# Patient Record
Sex: FEMALE | Race: WHITE | Employment: OTHER | URBAN - METROPOLITAN AREA
[De-identification: names, ages, dates, MRNs, and addresses within clinical notes are randomized per-mention and may not be internally consistent; named-entity substitution may affect disease eponyms.]

---

## 2022-10-24 ENCOUNTER — MEDICAL CORRESPONDENCE (OUTPATIENT)
Dept: HEALTH INFORMATION MANAGEMENT | Facility: CLINIC | Age: 77
End: 2022-10-24

## 2022-10-25 DIAGNOSIS — I48.91 A-FIB (H): Primary | ICD-10-CM

## 2023-03-24 ENCOUNTER — HOSPITAL ENCOUNTER (EMERGENCY)
Facility: CLINIC | Age: 78
Discharge: HOME | End: 2023-03-24
Attending: EMERGENCY MEDICINE | Admitting: EMERGENCY MEDICINE
Payer: COMMERCIAL

## 2023-03-24 VITALS
SYSTOLIC BLOOD PRESSURE: 120 MMHG | WEIGHT: 138 LBS | HEIGHT: 55 IN | OXYGEN SATURATION: 98 % | HEART RATE: 93 BPM | DIASTOLIC BLOOD PRESSURE: 81 MMHG | RESPIRATION RATE: 13 BRPM | BODY MASS INDEX: 31.94 KG/M2 | TEMPERATURE: 97.3 F

## 2023-03-24 DIAGNOSIS — I48.91 ATRIAL FIBRILLATION WITH RVR (H): ICD-10-CM

## 2023-03-24 PROBLEM — J30.2 SEASONAL ALLERGIES: Status: ACTIVE | Noted: 2020-06-08

## 2023-03-24 PROBLEM — R42 DIZZINESS: Status: ACTIVE | Noted: 2018-03-20

## 2023-03-24 PROBLEM — E87.1 ACUTE HYPONATREMIA: Status: ACTIVE | Noted: 2021-05-07

## 2023-03-24 PROBLEM — G45.9 TRANSIENT ISCHEMIC ATTACK (TIA): Status: ACTIVE | Noted: 2021-05-07

## 2023-03-24 PROBLEM — Z86.0101 HISTORY OF ADENOMATOUS POLYP OF COLON: Status: ACTIVE | Noted: 2018-01-02

## 2023-03-24 PROBLEM — H10.13 ALLERGIC CONJUNCTIVITIS OF BOTH EYES: Status: ACTIVE | Noted: 2020-06-08

## 2023-03-24 LAB
ANION GAP SERPL CALCULATED.3IONS-SCNC: 9 MMOL/L (ref 7–15)
ATRIAL RATE - MUSE: 93 BPM
ATRIAL RATE - MUSE: NORMAL BPM
BUN SERPL-MCNC: 11.6 MG/DL (ref 8–23)
CALCIUM SERPL-MCNC: 9.3 MG/DL (ref 8.8–10.2)
CHLORIDE SERPL-SCNC: 99 MMOL/L (ref 98–107)
CREAT SERPL-MCNC: 0.65 MG/DL (ref 0.51–0.95)
DEPRECATED HCO3 PLAS-SCNC: 21 MMOL/L (ref 22–29)
DIASTOLIC BLOOD PRESSURE - MUSE: NORMAL MMHG
DIASTOLIC BLOOD PRESSURE - MUSE: NORMAL MMHG
ERYTHROCYTE [DISTWIDTH] IN BLOOD BY AUTOMATED COUNT: 13.5 % (ref 10–15)
GFR SERPL CREATININE-BSD FRML MDRD: 90 ML/MIN/1.73M2
GLUCOSE SERPL-MCNC: 123 MG/DL (ref 70–99)
HCT VFR BLD AUTO: 40.6 % (ref 35–47)
HGB BLD-MCNC: 13.7 G/DL (ref 11.7–15.7)
INTERPRETATION ECG - MUSE: NORMAL
INTERPRETATION ECG - MUSE: NORMAL
MCH RBC QN AUTO: 29.1 PG (ref 26.5–33)
MCHC RBC AUTO-ENTMCNC: 33.7 G/DL (ref 31.5–36.5)
MCV RBC AUTO: 86 FL (ref 78–100)
NT-PROBNP SERPL-MCNC: 2315 PG/ML (ref 0–1800)
P AXIS - MUSE: 66 DEGREES
P AXIS - MUSE: NORMAL DEGREES
PLATELET # BLD AUTO: 391 10E3/UL (ref 150–450)
POTASSIUM SERPL-SCNC: 4.3 MMOL/L (ref 3.4–5.3)
PR INTERVAL - MUSE: 206 MS
PR INTERVAL - MUSE: NORMAL MS
QRS DURATION - MUSE: 134 MS
QRS DURATION - MUSE: 158 MS
QT - MUSE: 314 MS
QT - MUSE: 402 MS
QTC - MUSE: 499 MS
QTC - MUSE: 507 MS
R AXIS - MUSE: -16 DEGREES
R AXIS - MUSE: 22 DEGREES
RBC # BLD AUTO: 4.71 10E6/UL (ref 3.8–5.2)
SODIUM SERPL-SCNC: 129 MMOL/L (ref 136–145)
SYSTOLIC BLOOD PRESSURE - MUSE: NORMAL MMHG
SYSTOLIC BLOOD PRESSURE - MUSE: NORMAL MMHG
T AXIS - MUSE: -9 DEGREES
T AXIS - MUSE: 24 DEGREES
TROPONIN T SERPL HS-MCNC: 15 NG/L
TROPONIN T SERPL HS-MCNC: 16 NG/L
VENTRICULAR RATE- MUSE: 157 BPM
VENTRICULAR RATE- MUSE: 93 BPM
WBC # BLD AUTO: 8.1 10E3/UL (ref 4–11)

## 2023-03-24 PROCEDURE — 82310 ASSAY OF CALCIUM: CPT | Performed by: EMERGENCY MEDICINE

## 2023-03-24 PROCEDURE — 272N000240 HC CARDIOVERT/DEFIB/PACER SUPP

## 2023-03-24 PROCEDURE — 84484 ASSAY OF TROPONIN QUANT: CPT | Performed by: EMERGENCY MEDICINE

## 2023-03-24 PROCEDURE — 96374 THER/PROPH/DIAG INJ IV PUSH: CPT

## 2023-03-24 PROCEDURE — 93005 ELECTROCARDIOGRAM TRACING: CPT | Mod: 76

## 2023-03-24 PROCEDURE — 36415 COLL VENOUS BLD VENIPUNCTURE: CPT | Performed by: EMERGENCY MEDICINE

## 2023-03-24 PROCEDURE — 85048 AUTOMATED LEUKOCYTE COUNT: CPT | Performed by: EMERGENCY MEDICINE

## 2023-03-24 PROCEDURE — 999N000054 HC STATISTIC EKG NON-CHARGEABLE

## 2023-03-24 PROCEDURE — 83880 ASSAY OF NATRIURETIC PEPTIDE: CPT | Performed by: EMERGENCY MEDICINE

## 2023-03-24 PROCEDURE — 250N000011 HC RX IP 250 OP 636: Performed by: EMERGENCY MEDICINE

## 2023-03-24 PROCEDURE — 84484 ASSAY OF TROPONIN QUANT: CPT | Mod: 91 | Performed by: EMERGENCY MEDICINE

## 2023-03-24 PROCEDURE — 92960 CARDIOVERSION ELECTRIC EXT: CPT

## 2023-03-24 PROCEDURE — 99285 EMERGENCY DEPT VISIT HI MDM: CPT | Mod: 25

## 2023-03-24 PROCEDURE — 93005 ELECTROCARDIOGRAM TRACING: CPT

## 2023-03-24 RX ORDER — PROPAFENONE HYDROCHLORIDE 150 MG/1
1 TABLET, COATED ORAL 3 TIMES DAILY
COMMUNITY
Start: 2023-03-14 | End: 2023-04-06

## 2023-03-24 RX ORDER — ATORVASTATIN CALCIUM 10 MG/1
1 TABLET, FILM COATED ORAL DAILY
COMMUNITY
Start: 2023-03-07 | End: 2024-09-23

## 2023-03-24 RX ORDER — OLOPATADINE HYDROCHLORIDE 1 MG/ML
1 SOLUTION/ DROPS OPHTHALMIC 2 TIMES DAILY
COMMUNITY
End: 2023-07-31

## 2023-03-24 RX ORDER — PROPOFOL 10 MG/ML
INJECTION, EMULSION INTRAVENOUS DAILY PRN
Status: COMPLETED | OUTPATIENT
Start: 2023-03-24 | End: 2023-03-24

## 2023-03-24 RX ORDER — CETIRIZINE HYDROCHLORIDE 10 MG/1
10 TABLET ORAL DAILY
COMMUNITY

## 2023-03-24 RX ORDER — TIOTROPIUM BROMIDE 18 UG/1
18 CAPSULE ORAL; RESPIRATORY (INHALATION)
COMMUNITY
Start: 2022-11-16 | End: 2024-09-23 | Stop reason: ALTCHOICE

## 2023-03-24 RX ORDER — MONTELUKAST SODIUM 10 MG/1
1 TABLET ORAL AT BEDTIME
COMMUNITY
Start: 2023-01-16

## 2023-03-24 RX ORDER — METOPROLOL SUCCINATE 50 MG/1
1 TABLET, EXTENDED RELEASE ORAL DAILY
COMMUNITY
Start: 2023-02-22 | End: 2023-07-31

## 2023-03-24 RX ORDER — DOCUSATE SODIUM 100 MG/1
1 CAPSULE, LIQUID FILLED ORAL DAILY
COMMUNITY
Start: 2022-08-14

## 2023-03-24 RX ORDER — MOMETASONE FUROATE AND FORMOTEROL FUMARATE DIHYDRATE 200; 5 UG/1; UG/1
2 AEROSOL RESPIRATORY (INHALATION)
COMMUNITY
Start: 2022-11-16 | End: 2023-11-16

## 2023-03-24 RX ORDER — PROPOFOL 10 MG/ML
INJECTION, EMULSION INTRAVENOUS
Status: COMPLETED
Start: 2023-03-24 | End: 2023-03-24

## 2023-03-24 RX ORDER — ALBUTEROL SULFATE 90 UG/1
2 AEROSOL, METERED RESPIRATORY (INHALATION) EVERY 4 HOURS PRN
COMMUNITY
Start: 2022-11-16

## 2023-03-24 RX ADMIN — PROPOFOL 50 MG: 10 INJECTION, EMULSION INTRAVENOUS at 22:51

## 2023-03-24 ASSESSMENT — ENCOUNTER SYMPTOMS
SHORTNESS OF BREATH: 1
PALPITATIONS: 1

## 2023-03-24 ASSESSMENT — ACTIVITIES OF DAILY LIVING (ADL)
ADLS_ACUITY_SCORE: 35
ADLS_ACUITY_SCORE: 33

## 2023-03-25 NOTE — ED PROVIDER NOTES
History     Chief Complaint:  Chest Pain and Palpitations       The history is provided by the patient.      Margareth Hogue is a 77 year old female on Eliquis with a history of paroxysmal atrial fibrillation S/P ablation who presents with chest pain and palpitations. The patient reports that today she was seen in the clinic for shortness of breath with exertion. At that time she was found to be tachycardia with an irregular rhythm. She states that she felt well yesterday but notes that she did experience some difficulty breathing three days ago. She additionally complains of chest pressure for the past three days. She denies having missed any doses of Eliquis.    Independent Historian:   None - Patient Only    Review of External Notes: I reviewed her emergency room note from 3/2/2023 at [facility]    ROS:  Review of Systems   Respiratory: Positive for shortness of breath.    Cardiovascular: Positive for palpitations.   All other systems reviewed and are negative.    Allergies:  Sulfa Drugs  Diazepam  Meperidine  Morphine  Penicillins  Sulfamethoxazole-Trimethoprim  Zafirlukast     Medications:    Albuterol inhaler  Colace  Dulera  Eliquis  Flonase  Lipitor  Prilosec  Rythmol  Singulair  Spiriva  Toprol-XL    Past Medical History:    Acute hyponatremia  Allergic conjunctivitis of both eyes  Chronic insomnia  GERD without esophagitis  H/O adenomatous polyp of colon  H/O renal cell carcinoma  Hyperlipidemia  Irritable bowel syndrome without diarhea  Moderate persistent asthma  Paroxysmal atrial fibrillation  Primary osteoarthritis, left shoulder  Sarcoidosis, lung  Sensorineural hearing loss, bilateral  TIA    Past Surgical History:    Atrial ablation  Total nephrectomy, right  Shoulder arthroplasty, bilateral  Foot surgery, bilateral  Tonsillectomy  Appendectomy  Breast biopsy  Small intestine surgery    Family History:    Father: Cataracts, heart disease, lymphoma  Mother: Cataracts,CAD, glaucoma, MI,  "lymphoma  Brother: Leukemia, lymphoma    Social History:  The patient presents to the ED with her .  They arrived via private vehicle.    Physical Exam     Patient Vitals for the past 24 hrs:   BP Temp Temp src Pulse Resp SpO2 Height Weight   03/24/23 2303 120/81 -- -- 93 13 98 % -- --   03/24/23 2300 119/79 -- -- 92 15 98 % -- --   03/24/23 2255 119/73 -- -- 93 29 98 % -- --   03/24/23 2254 -- -- -- -- 17 -- -- --   03/24/23 2250 (!) 144/78 -- -- (!) 134 (!) 43 100 % -- --   03/24/23 2245 (!) 141/104 -- -- (!) 130 22 100 % -- --   03/24/23 2240 (!) 119/108 -- -- (!) 137 10 99 % -- --   03/24/23 2238 (!) 123/96 -- -- (!) 134 10 99 % -- --   03/24/23 1955 (!) 127/92 97.3  F (36.3  C) Temporal (!) 157 18 98 % 0.53 m (1' 8.87\") 62.6 kg (138 lb)        Physical Exam   Nursing note and vitals reviewed.    Constitutional:  Appears comfortable.    HENT:                Nose normal.  No discharge.      Oral mucosa is moist.  Eyes:    Conjunctivae are normal without injection.  Pupils are equal.  Cardiovascular:  Tachycardic, regular rhythm with normal S1 and S2.      Normal heart sounds and peripheral pulses 2+ and equal.       No murmur or zenon.  Pulmonary:  Effort normal and breath sounds clear to auscultation bilaterally.    No respiratory distress.  No stridor.     No wheezes. No rales.     GI:    Soft. No distension and no mass. No tenderness.      No flank pain.    Musculoskeletal:  Normal range of motion. No extremity deformity.     No edema and no tenderness.    Neurological:   Alert and oriented. No focal weakness.  Skin:    Skin is warm and dry. No rash noted.   Psychiatric:   Behavior is normal. Appropriate mood and affect.     Judgment and thought content normal.     Emergency Department Course   ECG  ECG taken at 1950, ECG read at 1955  Critical Test Result: High HR.  Atrial flutter.  Rate 157 bpm. CA interval * ms. QRS duration 134 ms. QT/QTc 314/507 ms. P-R-T axes * -16 -9.   ECG results from 03/24/23 "   EKG 12-lead, tracing only     Value    Systolic Blood Pressure     Diastolic Blood Pressure     Ventricular Rate 157    Atrial Rate     WY Interval     QRS Duration 134        QTc 507    P Axis     R AXIS -16    T Axis -9    Interpretation ECG      * Critical Test Result: High HR  Atrial Flutter with 2:1 block  Right bundle branch block  Abnormal ECG  No previous ECGs available  Confirmed by GENERATED REPORT, COMPUTER ([id number]),  Aasen, Bradley ([id number]) on 3/24/2023 8:21:51 PM     EKG 12 lead     Value    Systolic Blood Pressure     Diastolic Blood Pressure     Ventricular Rate 93    Atrial Rate 93    WY Interval 206    QRS Duration 158        QTc 499    P Axis 66    R AXIS 22    T Axis 24    Interpretation ECG      Sinus rhythm  Right bundle branch block  Septal infarct , age undetermined  Abnormal ECG  When compared with ECG of 24-MAR-2023 19:50,  Sinus rhythm has replaced Atrial Flutter  Vent. rate has decreased BY  64 BPM  Confirmed by GENERATED REPORT, COMPUTER ([id number]),  Avinash Singletary ([id number]) on 3/24/2023 10:59:35 PM           Laboratory:  Labs Ordered and Resulted from Time of ED Arrival to Time of ED Departure   BASIC METABOLIC PANEL - Abnormal       Result Value    Sodium 129 (*)     Potassium 4.3      Chloride 99      Carbon Dioxide (CO2) 21 (*)     Anion Gap 9      Urea Nitrogen 11.6      Creatinine 0.65      Calcium 9.3      Glucose 123 (*)     GFR Estimate 90     TROPONIN T, HIGH SENSITIVITY - Abnormal    Troponin T, High Sensitivity 16 (*)    TROPONIN T, HIGH SENSITIVITY - Abnormal    Troponin T, High Sensitivity 15 (*)    NT PROBNP INPATIENT - Abnormal    N terminal Pro BNP Inpatient 2,315 (*)    CBC WITH PLATELETS - Normal    WBC Count 8.1      RBC Count 4.71      Hemoglobin 13.7      Hematocrit 40.6      MCV 86      MCH 29.1      MCHC 33.7      RDW 13.5      Platelet Count 391          [facility]    -Cardioversion External    Date/Time: 3/24/2023 9:52  PM  Performed by: Genet Ibrahim MD  Authorized by: Genet Ibrahim MD     Risks, benefits and alternatives discussed.    ED EVALUATION:      Assessment Time: 3/24/2023 9:38 PM      I have performed an Emergency Department Evaluation including taking a history and physical examination, this evaluation will be documented in the electronic medical record for this ED encounter.     Indication: Rapid Atrial Fibrillation    ASA Class: Class 2- mild systemic disease, no acute problems, no functional limitations    Mallampati: Grade 1- soft palate, uvula, tonsillar pillars, and posterior pharyngeal wall visible    NPO Status: appropriately NPO for procedure    UNIVERSAL PROTOCOL   Site Marked: NA  Prior Images Obtained and Reviewed:  NA  Required items: Required blood products, implants, devices and special equipment available    Patient identity confirmed:  Verbally with patient and arm band  Patient was reevaluated immediately before administering moderate or deep sedation or anesthesia  Confirmation Checklist:  Patient's identity using two indicators  Time out: Immediately prior to the procedure a time out was called    Universal Protocol: the Joint Commission Universal Protocol was followed      SEDATION  Patient Sedated: Yes    Sedation Type:  Deep  Sedation:  Propofol  Vital signs: Vital signs monitored during sedation      PRE-PROCEDURE DETAILS:     Cardioversion basis:  Elective    Rhythm:  Atrial fibrillation    Electrode placement:  Anterior-posterior  Attempt one:     Cardioversion mode:  Synchronous    Waveform:  Biphasic    Shock (Joules):  150    Shock outcome:  Conversion to normal sinus rhythm  Post-procedure details:     Patient status:  Awake      PROCEDURE    Patient Tolerance:  Patient tolerated the procedure well with no immediate complications  Length of time physician/provider present for 1:1 monitoring during sedation: 15     Emergency Department Course &  Assessments:    Interventions:  Medications   propofol (DIPRIVAN) 200 MG/20ML injection (  Not Given 3/24/23 2259)   propofol (DIPRIVAN) injection 10 mg/mL vial (50 mg Intravenous $Given 3/24/23 2251)        Assessments:  2138 I obtained history and examined the patient.    Independent Interpretation (X-rays, CTs, rhythm strip):  None    Consultations/Discussion of Management or Tests:  None        Social Determinants of Health affecting care:   None    Disposition:  The patient was discharged to home.     Impression & Plan      Medical Decision Making:  Patient with known A-fib with a history of ablation comes in in rapid A-fib.  When she first got here it looked like atrial flutter but then it switched to atrial fibrillation.  It sounds like she was probably in it for the last couple of days, maybe up to 3 the way she has been feeling.  She feels some chest pressure.  Labs are obtained and I reviewed her records and her sodium was a little bit low which it has been recently at 129, renal function is normal, troponin was up slightly at 16 and her CBC is normal.  She is on Eliquis and has not missed any doses.  Her risk for clot formation is very very low and I feel she is a candidate for attempt at cardioversion.  After I talked to her about the procedure and risks and benefits along with propofol, she agreed and she was successfully cardioverted with 150 J.  Her pressure in her chest that she had felt completely went away after she was back in sinus rhythm.  I got a second troponin almost 3 hours later and it was 15 or unchanged.  I believe she had some demand ischemia or a troponin leak from the rapid heartbeat.  Her BNP was up a little as well at 2315 which would be consistent with tachycardia related heart strain.  She feels much better at this time.  I feel she can be discharged continue her current medications and will follow-up with cardiology.    Push fluids, take your medications as directed.  Schedule a  follow-up appointment with your cardiologist.  If you go back into fast atrial fibrillation again, return to the ER.    Diagnosis:    ICD-10-CM    1. Atrial fibrillation with RVR (H)  I48.91            Discharge Medications:  Discharge Medication List as of 3/24/2023 11:48 PM           Scribe Disclosure:  ANTHONY, Gabriel Cosby, am serving as a scribe at 9:57 PM on 3/24/2023 to document services personally performed by Genet Ibrahim MD based on my observations and the provider's statements to me.        Genet Ibrahim MD  03/24/23 2327

## 2023-03-25 NOTE — ED TRIAGE NOTES
Heart palpitations with chest pain, seen at clinic for asthma and found heart rate was up to 150s. History of a fib on Eliquis.      Triage Assessment     Row Name 03/24/23 1953       Triage Assessment (Adult)    Airway WDL WDL       Respiratory WDL    Respiratory WDL WDL       Skin Circulation/Temperature WDL    Skin Circulation/Temperature WDL WDL       Cardiac WDL    Cardiac WDL X       Peripheral/Neurovascular WDL    Peripheral Neurovascular WDL WDL       Cognitive/Neuro/Behavioral WDL    Cognitive/Neuro/Behavioral WDL WDL

## 2023-03-25 NOTE — DISCHARGE INSTRUCTIONS
Push fluids, take your medications as directed.  Schedule a follow-up appointment with your cardiologist.  If you go back into fast atrial fibrillation again, return to the ER.

## 2023-03-31 ENCOUNTER — HOSPITAL ENCOUNTER (EMERGENCY)
Facility: CLINIC | Age: 78
Discharge: HOME | End: 2023-03-31
Attending: EMERGENCY MEDICINE | Admitting: EMERGENCY MEDICINE
Payer: COMMERCIAL

## 2023-03-31 VITALS
SYSTOLIC BLOOD PRESSURE: 126 MMHG | WEIGHT: 130 LBS | DIASTOLIC BLOOD PRESSURE: 74 MMHG | HEART RATE: 73 BPM | HEIGHT: 63 IN | RESPIRATION RATE: 12 BRPM | BODY MASS INDEX: 23.04 KG/M2 | TEMPERATURE: 97.9 F | OXYGEN SATURATION: 100 %

## 2023-03-31 DIAGNOSIS — I48.92 ATRIAL FLUTTER WITH RAPID VENTRICULAR RESPONSE (H): ICD-10-CM

## 2023-03-31 LAB
ATRIAL RATE - MUSE: NORMAL BPM
DIASTOLIC BLOOD PRESSURE - MUSE: NORMAL MMHG
INTERPRETATION ECG - MUSE: NORMAL
P AXIS - MUSE: NORMAL DEGREES
PR INTERVAL - MUSE: NORMAL MS
QRS DURATION - MUSE: 156 MS
QT - MUSE: 364 MS
QTC - MUSE: 507 MS
R AXIS - MUSE: -13 DEGREES
SYSTOLIC BLOOD PRESSURE - MUSE: NORMAL MMHG
T AXIS - MUSE: 10 DEGREES
VENTRICULAR RATE- MUSE: 117 BPM

## 2023-03-31 PROCEDURE — 999N000157 HC STATISTIC RCP TIME EA 10 MIN

## 2023-03-31 PROCEDURE — 250N000011 HC RX IP 250 OP 636: Performed by: EMERGENCY MEDICINE

## 2023-03-31 PROCEDURE — 99285 EMERGENCY DEPT VISIT HI MDM: CPT | Mod: 25

## 2023-03-31 PROCEDURE — 93005 ELECTROCARDIOGRAM TRACING: CPT | Mod: 59

## 2023-03-31 PROCEDURE — 92960 CARDIOVERSION ELECTRIC EXT: CPT

## 2023-03-31 PROCEDURE — 96374 THER/PROPH/DIAG INJ IV PUSH: CPT | Mod: 59

## 2023-03-31 RX ORDER — PROPOFOL 10 MG/ML
INJECTION, EMULSION INTRAVENOUS DAILY PRN
Status: COMPLETED | OUTPATIENT
Start: 2023-03-31 | End: 2023-03-31

## 2023-03-31 RX ORDER — PROPOFOL 10 MG/ML
INJECTION, EMULSION INTRAVENOUS
Status: DISCONTINUED
Start: 2023-03-31 | End: 2023-03-31 | Stop reason: HOSPADM

## 2023-03-31 RX ADMIN — PROPOFOL 50 MG: 10 INJECTION, EMULSION INTRAVENOUS at 08:16

## 2023-03-31 ASSESSMENT — ENCOUNTER SYMPTOMS
SHORTNESS OF BREATH: 1
SLEEP DISTURBANCE: 1
PALPITATIONS: 1

## 2023-03-31 ASSESSMENT — ACTIVITIES OF DAILY LIVING (ADL): ADLS_ACUITY_SCORE: 35

## 2023-03-31 NOTE — ED PROVIDER NOTES
History     Chief Complaint:  Palpitations       HPI   Margareth Hogue is a 77 year old anticoagulated female with a history of a-fib, cancer, and asthma who presents with palpitations that began yesterday. She was recently cardioverted for a-fib, and states that she felt she went into a-fib again. She has not been able to sleep well and has been short of breath as a result. She has not missed any doses of her Eliquis.      Independent Historian:   Patient    Review of External Notes: I reviewed her ED visit on 3/24 for a-fib where she underwent cardioversion.    ROS:  Review of Systems   Respiratory: Positive for shortness of breath.    Cardiovascular: Positive for palpitations.   Psychiatric/Behavioral: Positive for sleep disturbance.   All other systems reviewed and are negative.      Allergies:  Sulfa Drugs  Diazepam  Meperidine  Morphine  Penicillins  Sulfamethoxazole-Trimethoprim  Zafirlukast      Medications:    Albuterol inhaler  Colace  Dulera  Eliquis  Flonase  Lipitor  Prilosec  Rythmol  Singulair  Spiriva  Toprol-XL     Past Medical History:    Acute hyponatremia  Allergic conjunctivitis of both eyes  Chronic insomnia  GERD without esophagitis  adenomatous polyp of colon  renal cell carcinoma  Hyperlipidemia  Irritable bowel syndrome without diarhea  Moderate persistent asthma  Paroxysmal atrial fibrillation  Primary osteoarthritis, left shoulder  Sarcoidosis, lung  Sensorineural hearing loss, bilateral  TIA     Past Surgical History:    Atrial ablation  Total nephrectomy, right  Shoulder arthroplasty, bilateral  Foot surgery, bilateral  Tonsillectomy  Appendectomy  Breast biopsy  Small intestine surgery     Family History:    Father: Cataracts, heart disease, lymphoma  Mother: Cataracts,CAD, glaucoma, MI, lymphoma  Brother: Leukemia, lymphoma    Social History:     PCP: Nadira Rob     Physical Exam     Patient Vitals for the past 24 hrs:   BP Temp Temp src Pulse Resp SpO2 Height Weight  "  03/31/23 0915 -- -- -- 76 15 -- -- --   03/31/23 0906 -- -- -- 74 10 -- -- --   03/31/23 0820 126/74 -- -- 77 12 100 % -- --   03/31/23 0815 134/85 -- -- 105 15 100 % -- --   03/31/23 0810 (!) 140/108 -- -- 104 14 100 % -- --   03/31/23 0743 (!) 150/103 97.9  F (36.6  C) Oral 119 16 98 % 1.6 m (5' 3\") 59 kg (130 lb)        Physical Exam  Eye:  Pupils are equal, round, and reactive.  Extraocular movements intact.    ENT:  No rhinorrhea.  Moist mucus membranes.  Normal tongue and tonsil.    Cardiac:  Tachycardic but regular rate and rhythm.  No murmurs, gallops, or rubs.    Pulmonary:  Clear to auscultation bilaterally.  No wheezes, rales, or rhonchi.    Abdomen:  Positive bowel sounds.  Abdomen is soft and non-distended, without focal tenderness.    Musculoskeletal:  Normal movement of all extremities without evidence for deficit.    Skin:  Warm and dry without rashes.    Neurologic:  Non-focal exam without asymmetric weakness or numbness.     Psychiatric:  Normal affect with appropriate interaction with examiner.      Emergency Department Course   ECG  ECG taken at 0740, ECG read at 0745  A-flutter  Indeterminate axis  Right BBB  Abnormal ECG   Rate 117 bpm. WI interval * ms. QRS duration 156 ms. QT/QTc 364/507 ms. P-R-T axes * -13 10.     ECG #2  ECG taken at 0815, ECG read at 0815  Sinus rhythm with 1st degree AV block  Right BBB  Abnormal ECG   Rate 77 bpm. WI interval 216 ms. QRS duration 170 ms. QT/QTc 428/484 ms. P-R-T axes 62 -6 18.     Procedures   Electrical Cardioversion         Procedure: Electrical Cardioversion        Indication: Atrial Flutter     Consent: Written from Patient     Universal Protocol: Universal protocol was followed and time out conducted just prior to starting procedure, confirming patient identity, site/side, procedure, patient position, and availability of correct equipment and implants.      Anesthesia/Sedation: Sedation: The patient was sedated, see separate procedure note for " details.      Procedure Detail:   Electrodes were placed: Anterior/lateral  Trial #1: Synchronized Cardioversion at 150 Joules      Patient Status:  The patient tolerated the procedure well: Yes. There were no complications.    .    Sedation     Procedure: Procedural Sedation    Sedation Level: Moderate    Indication: Cardioversion    Consent: Written from Patient     Universal protocol: Universal protocol was followed and time out conducted just prior to starting procedure, confirming patient identity, site/side, procedure, patient position, and availability of correct equipment and implants.     Last PO Intake: Emergent procedure    ASA Class: Class 1 - A normal healthy patient     Exam:  Mallampati:  Grade 1 - Soft palate, uvula, and pillars visible    Lungs: Clear   Heart: Irregular rate and rhythm     Medication: Propofol  Dose: 50 mg     Monitoring:  Monitoring consisted of heart rate, cardiac, continuous pulse oximetry, frequent blood pressure checks.   There was constant attendance by RN until patient recovered and constant attendance by physician until patient stable.   Intubation and emergency airway equipment available.     Response: Vital signs stable, oxygen saturations greater than 92%       Patient Status: Post procedure patient was alert.     Total Physician Drug Administration / Monitoring Time: 9 minutes.     Patient was monitored during recovery and returned to pre-procedure baseline.     Emergency Department Course & Assessments:    Interventions:  Medications   propofol (DIPRIVAN) injection 10 mg/mL vial (50 mg Intravenous $Given 3/31/23 0816)      Assessments:  0747 I obtained history and examined the patient, as noted above.  0814 I began the procedure at this time.    Independent Interpretation (X-rays, CTs, rhythm strip):  None    Consultations/Discussion of Management or Tests:  None     Social Determinants of Health affecting care:   None    Disposition:  The patient was discharged to  home.     Impression & Plan      Medical Decision Making:  This pleasant 77-year-old woman with a history of atrial fibrillation status post ablation, now with recurrent spells of atrial flutter undergoing cardioversion last week returns to us because of cardiac dysrhythmia back to atrial flutter.  She noticed this occurred last night and had a hard night of sleep.  She has been taking her anticoagulants.  She denies any significant chest pain or shortness of breath.    On exam, she appears well.  EKG does show that she is in atrial flutter with a rapid ventricular response.  I did not elect to recheck her labs as they were just done earlier in the week and were unremarkable.  She was moved to a stabilization room and electrocardioverted with use of propofol as discussed above.  She was then observed over an hour during which time she had complete return to her baseline and feels improved.  I have put in for a cardiology outpatient consultation as she may benefit from a more expedient visit with electrophysiology.  She will continue medications.  She will return to us for any other emergent concerns.    Diagnosis:    ICD-10-CM    1. Atrial flutter with rapid ventricular response (H)  I48.92 Adult Cardiology al [facility] Referral           Scribe Disclosure:  London CRUZ, am serving as a scribe at 7:50 AM on 3/31/2023 to document services personally performed by Trierweiler, Chad A, MD based on my observations and the provider's statements to me.      Trierweiler, Chad A, MD  04/01/23 0754

## 2023-03-31 NOTE — ED TRIAGE NOTES
Pt feels like she is in afib. States she is on a blood thinner. Reports she was cardioverted earlier this week.      Triage Assessment     Row Name 03/31/23 0739       Triage Assessment (Adult)    Airway WDL WDL       Respiratory WDL    Respiratory WDL WDL       Skin Circulation/Temperature WDL    Skin Circulation/Temperature WDL WDL       Cardiac WDL    Cardiac WDL X  afib       Peripheral/Neurovascular WDL    Peripheral Neurovascular WDL WDL       Cognitive/Neuro/Behavioral WDL    Cognitive/Neuro/Behavioral WDL WDL

## 2023-04-06 ENCOUNTER — OFFICE VISIT (OUTPATIENT)
Dept: CARDIOLOGY | Facility: CLINIC | Age: 78
End: 2023-04-06
Attending: EMERGENCY MEDICINE
Payer: COMMERCIAL

## 2023-04-06 VITALS
SYSTOLIC BLOOD PRESSURE: 138 MMHG | HEIGHT: 61 IN | RESPIRATION RATE: 17 BRPM | WEIGHT: 134 LBS | OXYGEN SATURATION: 99 % | BODY MASS INDEX: 25.3 KG/M2 | DIASTOLIC BLOOD PRESSURE: 96 MMHG | HEART RATE: 123 BPM

## 2023-04-06 DIAGNOSIS — I48.92 ATRIAL FLUTTER WITH RAPID VENTRICULAR RESPONSE (H): Primary | ICD-10-CM

## 2023-04-06 DIAGNOSIS — I48.92 ATRIAL FLUTTER WITH RAPID VENTRICULAR RESPONSE (H): ICD-10-CM

## 2023-04-06 PROCEDURE — 93000 ELECTROCARDIOGRAM COMPLETE: CPT | Performed by: INTERNAL MEDICINE

## 2023-04-06 PROCEDURE — 99205 OFFICE O/P NEW HI 60 MIN: CPT | Performed by: INTERNAL MEDICINE

## 2023-04-06 RX ORDER — DILTIAZEM HYDROCHLORIDE 120 MG/1
120 CAPSULE, EXTENDED RELEASE ORAL DAILY
Qty: 90 CAPSULE | Refills: 0 | Status: SHIPPED | OUTPATIENT
Start: 2023-04-06 | End: 2023-06-20

## 2023-04-06 NOTE — PROGRESS NOTES
CARDIOLOGY CLINIC CONSULTATION    PRIMARY CARE PHYSICIAN:  Nadira Rob    The level of medical decision making during this visit was of severe complexity.    HISTORY OF PRESENT ILLNESS:  Ms. Snyder is a very pleasant 77-year-old with history of atrial fibrillation s/p ablation in 2013, Transient ischemic attack, Hyperlipidemia, Right nephrectomy for renal cell carcinoma, Pulmonary sarcoidosis, GERD, asthma who presents to the clinic in initial consultation.  She was personally followed at [facility] but is satisfied with the care she is receiving there.    She has recently been seen multiple times in the emergency department for palpitations and noted to be in atrial fibrillation/flutter for which she required cardioversion.  These 2 visits were 1 week apart with the last one being on 3/31/2023.  The EKG performed prior to cardioversion during the first visit shows an organized rhythm which likely represented SVT/flutter or A. tach.  I do not have EKG from second visit before cardioversion.  As expected, she takes anticoagulation and has not missed any doses.  She had a Zio patch in 01/2023 which showed frequent episodes of SVT/A. tach.  Symptoms at that time but associated with ectopies and PVCs.  She has been on metoprolol as well as propafenone.  The plan was to increase the dose of propafenone if the Zio patch had reported earlier showed increased burden of atrial fibrillation/flutter but did not allow.     Today, she tells me that she has been doing well since last hospital admission.  He denies palpitations, pounding in chest, chest pain, shortness of breath, dyspnea, PND, lower extremity edema, presyncope or syncope.  An EKG performed in the clinic today was interpreted by me and shows atrial flutter with 2-1 block.  Ventricular rate is 115 bpm.  QRS is wide with right bundle morphology.  Prior testing includes the nuclear stress test in 03/2023 which was negative for ischemia.  She also had an  echocardiogram in 0/2023 which showed normal biventricular size and systolic function and no significant valvular disease.    PAST MEDICAL HISTORY:  No past medical history on file.    MEDICATIONS:  Current Outpatient Medications   Medication     albuterol (PROAIR HFA/PROVENTIL HFA/VENTOLIN HFA) 108 (90 Base) MCG/ACT inhaler     apixaban ANTICOAGULANT (ELIQUIS ANTICOAGULANT) 5 MG tablet     atorvastatin (LIPITOR) 10 MG tablet     calcium carbonate (OS-SHINE) 1500 (600 Ca) MG tablet     cetirizine (ZYRTEC) 10 MG tablet     docusate sodium (DSS) 100 MG capsule     fluticasone (ARNUITY ELLIPTA) 50 MCG/ACT inhaler     ipratropium (ATROVENT) 0.02 % neb solution     metoprolol succinate ER (TOPROL XL) 50 MG 24 hr tablet     mometasone-formoterol (DULERA) 200-5 MCG/ACT inhaler     montelukast (SINGULAIR) 10 MG tablet     omeprazole (PRILOSEC) 20 MG DR capsule     propafenone (RYTHMOL) 150 MG TABS tablet     tiotropium (SPIRIVA HANDIHALER) 18 MCG inhaled capsule     olopatadine (PATANOL) 0.1 % ophthalmic solution     No current facility-administered medications for this visit.       ALLERGIES:  Allergies   Allergen Reactions     Sulfa Drugs Unknown     Diazepam Other (See Comments)     Makes more anxious     Meperidine Nausea and Vomiting and Unknown     Morphine Nausea and Unknown     Penicillins Itching     Sulfamethoxazole-Trimethoprim Unknown     Zafirlukast        SOCIAL HISTORY:  I have reviewed this patient's social history and updated it with pertinent information if needed. Margareth Hogue  reports that she has never smoked. She has never used smokeless tobacco.    FAMILY HISTORY:  I have reviewed this patient's family history and updated it with pertinent information if needed.   Family History   Problem Relation Age of Onset     Heart Defect Mother      Myocardial Infarction Father        REVIEW OF SYSTEMS:  Skin:        Eyes:       ENT:       Respiratory:       Cardiovascular:       Gastroenterology:       Genitourinary:       Musculoskeletal:       Neurologic:       Psychiatric:       Heme/Lymph/Imm:       Endocrine:           PHYSICAL EXAM:      BP: (!) 138/96 Pulse: (!) 123   Resp: 17 SpO2: 99 %      Vital Signs with Ranges  Pulse:  [123] 123  Resp:  [17] 17  BP: (138)/(96) 138/96  SpO2:  [99 %] 99 %  134 lbs 0 oz    Constitutional: alert, no distress  Respiratory: Good bilateral air entry  Cardiovascular: S1-S2 normal, no murmurs  GI: nondistended  Neuropsychiatric: appropriate affact    ASSESSMENT: Pertinent issues addressed/ reviewed during this cardiology visit    1.  Atrial flutter with 2-1 block  2.  History of paroxysmal atrial fibrillation-on anticoagulation and propafenone  3.  SVT/atrial tachycardia on Zio patch in 01/2023  4.  Essential hypertension  5.  Hyperlipidemia    RECOMMENDATIONS:  1. The patient is in atrial flutter with 2 is to 1 block.  She is minimally symptomatic and hence I will hold off on cardioversion or EP referral for ablation.  I will however control the ventricular rate better and hence started on diltiazem.  2. She had ablation for atrial fibrillation in 2013 and last device monitoring did not show significant A-fib burden. She however has continued to have multiple episodes of a flutter, SVT and A. tach.  For this reason I will discontinue propafenone.  Carla sided Dr Mcmillan from EP.   3. For SVT/A. tach I will start her on diltiazem and monitor for now.  We will repeat rhythm monitor in 3 weeks to reassess rhythm, burden of arrhythmia as well as heart rate control.  She will then see electrophysiology after that.  4. Her blood pressure is mildly elevated in clinic today but I think diltiazem will help lower it.  5. She will continue to take current dose of Lipitor.    It was a pleasure seeing this patient in clinic today. Please do not hesitate to contact me with any future questions.     Melo WARD, [facility]  Cardiology - [facility]  April 6, 2023    This note was completed in  part using dictation via the Dragon voice recognition software. Some word and grammatical errors may occur and must be interpreted in the appropriate clinical context.  If there are any questions pertaining to this issue, please contact me for further clarification.

## 2023-04-06 NOTE — LETTER
4/6/2023    Nadira Rob MD  [address]  [place] [address]    RE: Margareth Hogue       Dear Colleague,     I had the pleasure of seeing Margareth Hogue in the [facility].  CARDIOLOGY CLINIC CONSULTATION    PRIMARY CARE PHYSICIAN:  Nadira Rob    The level of medical decision making during this visit was of severe complexity.    HISTORY OF PRESENT ILLNESS:  Ms. Snyder is a very pleasant 77-year-old with history of atrial fibrillation s/p ablation in 2013, Transient ischemic attack, Hyperlipidemia, Right nephrectomy for renal cell carcinoma, Pulmonary sarcoidosis, GERD, asthma who presents to the clinic in initial consultation.  She was personally followed at [facility] but is satisfied with the care she is receiving there.    She has recently been seen multiple times in the emergency department for palpitations and noted to be in atrial fibrillation/flutter for which she required cardioversion.  These 2 visits were 1 week apart with the last one being on 3/31/2023.  The EKG performed prior to cardioversion during the first visit shows an organized rhythm which likely represented SVT/flutter or A. tach.  I do not have EKG from second visit before cardioversion.  As expected, she takes anticoagulation and has not missed any doses.  She had a Zio patch in 01/2023 which showed frequent episodes of SVT/A. tach.  Symptoms at that time but associated with ectopies and PVCs.  She has been on metoprolol as well as propafenone.  The plan was to increase the dose of propafenone if the Zio patch had reported earlier showed increased burden of atrial fibrillation/flutter but did not allow.     Today, she tells me that she has been doing well since last hospital admission.  He denies palpitations, pounding in chest, chest pain, shortness of breath, dyspnea, PND, lower extremity edema, presyncope or syncope.  An EKG performed in the clinic today was interpreted by me and shows atrial  flutter with 2-1 block.  Ventricular rate is 115 bpm.  QRS is wide with right bundle morphology.  Prior testing includes the nuclear stress test in 03/2023 which was negative for ischemia.  She also had an echocardiogram in 0/2023 which showed normal biventricular size and systolic function and no significant valvular disease.    PAST MEDICAL HISTORY:  No past medical history on file.    MEDICATIONS:  Current Outpatient Medications   Medication    albuterol (PROAIR HFA/PROVENTIL HFA/VENTOLIN HFA) 108 (90 Base) MCG/ACT inhaler    apixaban ANTICOAGULANT (ELIQUIS ANTICOAGULANT) 5 MG tablet    atorvastatin (LIPITOR) 10 MG tablet    calcium carbonate (OS-SHINE) 1500 (600 Ca) MG tablet    cetirizine (ZYRTEC) 10 MG tablet    docusate sodium (DSS) 100 MG capsule    fluticasone (ARNUITY ELLIPTA) 50 MCG/ACT inhaler    ipratropium (ATROVENT) 0.02 % neb solution    metoprolol succinate ER (TOPROL XL) 50 MG 24 hr tablet    mometasone-formoterol (DULERA) 200-5 MCG/ACT inhaler    montelukast (SINGULAIR) 10 MG tablet    omeprazole (PRILOSEC) 20 MG DR capsule    propafenone (RYTHMOL) 150 MG TABS tablet    tiotropium (SPIRIVA HANDIHALER) 18 MCG inhaled capsule    olopatadine (PATANOL) 0.1 % ophthalmic solution     No current facility-administered medications for this visit.       ALLERGIES:  Allergies   Allergen Reactions    Sulfa Drugs Unknown    Diazepam Other (See Comments)     Makes more anxious    Meperidine Nausea and Vomiting and Unknown    Morphine Nausea and Unknown    Penicillins Itching    Sulfamethoxazole-Trimethoprim Unknown    Zafirlukast        SOCIAL HISTORY:  I have reviewed this patient's social history and updated it with pertinent information if needed. Margareth Hogue  reports that she has never smoked. She has never used smokeless tobacco.    FAMILY HISTORY:  I have reviewed this patient's family history and updated it with pertinent information if needed.   Family History   Problem Relation Age of Onset    Heart  Defect Mother     Myocardial Infarction Father        REVIEW OF SYSTEMS:  Skin:        Eyes:       ENT:       Respiratory:       Cardiovascular:       Gastroenterology:      Genitourinary:       Musculoskeletal:       Neurologic:       Psychiatric:       Heme/Lymph/Imm:       Endocrine:           PHYSICAL EXAM:      BP: (!) 138/96 Pulse: (!) 123   Resp: 17 SpO2: 99 %      Vital Signs with Ranges  Pulse:  [123] 123  Resp:  [17] 17  BP: (138)/(96) 138/96  SpO2:  [99 %] 99 %  134 lbs 0 oz    Constitutional: alert, no distress  Respiratory: Good bilateral air entry  Cardiovascular: S1-S2 normal, no murmurs  GI: nondistended  Neuropsychiatric: appropriate affact    ASSESSMENT: Pertinent issues addressed/ reviewed during this cardiology visit    1.  Atrial flutter with 2-1 block  2.  History of paroxysmal atrial fibrillation-on anticoagulation and propafenone  3.  SVT/atrial tachycardia on Zio patch in 01/2023  4.  Essential hypertension  5.  Hyperlipidemia    RECOMMENDATIONS:  The patient is in atrial flutter with 2 is to 1 block.  She is minimally symptomatic and hence I will hold off on cardioversion or EP referral for ablation.  I will however control the ventricular rate better and hence started on diltiazem.  She had ablation for atrial fibrillation in 2013 and last device monitoring did not show significant A-fib burden. She however has continued to have multiple episodes of a flutter, SVT and A. tach.  For this reason I will discontinue propafenone.  Carla Mcmillan from EP.   For SVT/A. tach I will start her on diltiazem and monitor for now.  We will repeat rhythm monitor in 3 weeks to reassess rhythm, burden of arrhythmia as well as heart rate control.  She will then see electrophysiology after that.  Her blood pressure is mildly elevated in clinic today but I think diltiazem will help lower it.  She will continue to take current dose of Lipitor.    It was a pleasure seeing this patient in clinic today.  Please do not hesitate to contact me with any future questions.     Melo WARD, [facility]  Cardiology - [facility]  April 6, 2023    This note was completed in part using dictation via the Dragon voice recognition software. Some word and grammatical errors may occur and must be interpreted in the appropriate clinical context.  If there are any questions pertaining to this issue, please contact me for further clarification.      Thank you for allowing me to participate in the care of your patient.      Sincerely,     Melo Louis MD     [facility]  cc:   Chad A Trierweiler, MD  [facility]  [address]  [address]  [place] [address]

## 2023-04-07 ENCOUNTER — TELEPHONE (OUTPATIENT)
Dept: CARDIOLOGY | Facility: CLINIC | Age: 78
End: 2023-04-07
Payer: COMMERCIAL

## 2023-04-07 NOTE — TELEPHONE ENCOUNTER
I called patient and spoke to her about her concerns.    I asked when she started taking Diltiazem that was prescribed to her by Dr. Louis yesterday.    She told me she took on last evening and this morning. During this time she was experiencing some rapid heart rates ( afib/flutter) and she was short of breath, felt strange and her hands were shaking a bit making it difficult to play her piano.    I told her that at this point my impression is that her rapid heart rates could be producing the symptoms rather than the medication and that we need to give Diltiazem a little more time.    She does not have a blood pressure unit and I encouraged her to get a unit with an arm cuff  ( [employer]) . I also spent time reviewing with her how to take and record accurate BP's ( 2 consecutive am and pm) every other day.    She verbalized agreement with this plan and will call us back next week to give us BP readings.

## 2023-04-07 NOTE — TELEPHONE ENCOUNTER
[facility] Call Center    Phone Message    May a detailed message be left on voicemail: yes     Reason for Call: Other: Pt would like a call back to discuss her medication she was given yesterday as she is having side effects , she is having high BP, brain fog and her hands feel funny, she would like a call back asap to discuss     Action Taken: Message routed to:  [facility] ([facility] Cardio    Travel Screening: Not Applicable

## 2023-05-08 ENCOUNTER — HOSPITAL ENCOUNTER (OUTPATIENT)
Dept: CARDIOLOGY | Facility: CLINIC | Age: 78
Discharge: HOME | End: 2023-05-08
Attending: INTERNAL MEDICINE | Admitting: INTERNAL MEDICINE
Payer: COMMERCIAL

## 2023-05-08 DIAGNOSIS — I48.92 ATRIAL FLUTTER WITH RAPID VENTRICULAR RESPONSE (H): ICD-10-CM

## 2023-05-08 PROCEDURE — 93244 EXT ECG>48HR<7D REV&INTERPJ: CPT | Performed by: INTERNAL MEDICINE

## 2023-05-08 PROCEDURE — 93242 EXT ECG>48HR<7D RECORDING: CPT

## 2023-06-20 ENCOUNTER — OFFICE VISIT (OUTPATIENT)
Dept: CARDIOLOGY | Facility: CLINIC | Age: 78
End: 2023-06-20
Attending: INTERNAL MEDICINE
Payer: COMMERCIAL

## 2023-06-20 ENCOUNTER — TELEPHONE (OUTPATIENT)
Dept: CARDIOLOGY | Facility: CLINIC | Age: 78
End: 2023-06-20

## 2023-06-20 VITALS
OXYGEN SATURATION: 97 % | SYSTOLIC BLOOD PRESSURE: 120 MMHG | RESPIRATION RATE: 17 BRPM | DIASTOLIC BLOOD PRESSURE: 80 MMHG | WEIGHT: 136 LBS | HEART RATE: 89 BPM | HEIGHT: 64 IN | BODY MASS INDEX: 23.22 KG/M2

## 2023-06-20 DIAGNOSIS — I48.92 ATRIAL FLUTTER WITH RAPID VENTRICULAR RESPONSE (H): ICD-10-CM

## 2023-06-20 DIAGNOSIS — I47.19 ATRIAL TACHYCARDIA (H): Primary | ICD-10-CM

## 2023-06-20 PROCEDURE — 93000 ELECTROCARDIOGRAM COMPLETE: CPT | Performed by: INTERNAL MEDICINE

## 2023-06-20 PROCEDURE — 99214 OFFICE O/P EST MOD 30 MIN: CPT | Performed by: INTERNAL MEDICINE

## 2023-06-20 RX ORDER — DILTIAZEM HYDROCHLORIDE 120 MG/1
120 CAPSULE, EXTENDED RELEASE ORAL 2 TIMES DAILY
Qty: 180 CAPSULE | Refills: 3 | Status: SHIPPED | OUTPATIENT
Start: 2023-06-20 | End: 2024-06-11

## 2023-06-20 NOTE — TELEPHONE ENCOUNTER
6/20/23 Pt called in follow up to OV today w Dr Mcmillan . It was recommended she increase her Diltiazem 120 from once daily to BID.  Pt is calling as she has HR data on her phone, which she did not have with her at OV  She wants Dr Mcmillan to have the following info  6/4- HR 91  6/2 HR 88 and 95  5/31- 85 5/26-86 5/19- 71 5/16- 102  5/3 -58    She is wondering if she still should take the med increase or keep things as is.  Routing to Dr Mcmillan for review, will call pt back w recommendations.  [address] pm

## 2023-06-20 NOTE — LETTER
6/20/2023    Nadira Rob MD  [address], [address]  [facility] [address]    RE: Margareth Hogue       Dear Colleague,     I had the pleasure of seeing Margareth Hogue in the [facility].  PHYSICIAN NOTE:  This visit was completed in person at the [facility].      I had the pleasure of seeing Ms Margareth Hogue for evaluation of atrial tachycardia/flutter.  She has been referred by Dr. Melo Louis.    This pleasant 78-year-old female has long h/o symptomatic paroxysmal atrial fibrillation and underwent catheter ablation in 2013 ([facility] in [place], [place]).  Symptoms markedly improved for few years but ultimately AF returned.  She was later started on propafenone 150 mg 3 times daily and Eliquis.  Reportedly intolerant of dronedarone and flecainide.      The patient was seen twice at the [facility] ER in May, 3/24 and 3/31/2023.  Both times she presented with symptomatic atrial tachycardia.  She required cardioversion.    She saw Dr. Louis in early April; on that day she was in atrial tachycardia/flutter with RVR.  Dr. Louis stopped propafenone, as this was no longer effective in controlling the patient's atrial arrhythmias.  He started diltiazem  mg daily for rate control.    The patient said that she has experienced several episodes of heart racing recently, including one event that lasted 2 days.  Her heart rate was between 100 and 150 and she did not feel well.  However, she did not feel unwell enough to go to the ER.  She also stated that her BP has been high recently.    Past medical history: Dyslipidemia, right nephrectomy for renal cell carcinoma, pulmonary sarcoidosis, GERD, asthma, history of TIA, AF as above.    Social & family history:  Dad had CAD, she does not recall when he had his first MI.  The patient is a non-smoker and drinks minimal alcohol.  Lives with , independently, in senior building.      PHYSICAL EXAMINATION:  Vital signs: 120/80, 89, 61.7  kg, BMI 23.7  General:  in no apparent distress, very pleasant lady, somewhat vague on details  ENT/Mouth:  no nasal discharge.  Eyes:  normal conjunctivae.   Neck:  no thyromegaly or lymphadenopathy.  Chest/Lungs:  patient is not dyspneic.  Lungs CTA, without rales or wheezing  Cardiovascular: Normal JVP, rhythm is regular.  No gallop, murmur or rub.    Abdomen:  no abdominal distention.  Soft, negative HJR.    Extremities:  no edema  Skin:  no xanthelasma.    Neurologic:  alert & oriented x 3.  No tremor.    Vascular:  2+ carotids without bruits.  2+ radials.        DIAGNOSTIC STUDIES:  Laboratory studies: Sodium 129, potassium 4.3, creatinine 0.65, proBNP 2315, troponin 16 - 15, hematocrit 40.6%  ECG: Sinus rhythm with RBBB, PAC  Echocardiogram (03/2023): EF 60%, normal RV, normal atrial size, mild aortic sclerosis without stenosis.  Cardiac monitor (May 2023): 3-day monitor showed 3032 SVT runs longest of 53 seconds, frequent PACs (6%), occasional PVCs (1.4%).  No symptomatic activations by the patient.      IMPRESSION:  Paroxysmal atrial tachycardia.  She had previous PVI (2013).  Most likely her clinical arrhythmia represents PV conduction recovery.  Currently on diltiazem  mg daily, but she continues to have RVR during episodes.  She is tolerating anticoagulation well, except for minor bruising.  [id number] is at least 5.  Pulmonary sarcoidosis.  She follows with pulmonary.    RECOMMENDATIONS:  Increase diltiazem  mg from once daily to twice daily.  A new prescription was sent to the pharmacy.  Continue apixaban.  Follow-up with EP ANTONY in 5-6 weeks.  If she continues to have frequent episodes of arrhythmia, we would consider a trial of sotalol.    It was my pleasure seeing this delightful patient.  Please feel free to call with any questions.   Medical decision-making was of moderate complexity.     Hudson Mcmillan MD, [facility]        Encounter Diagnosis   Name Primary?    Atrial flutter with  rapid ventricular response (H)        CURRENT MEDICATIONS:  Current Outpatient Medications   Medication Sig Dispense Refill    albuterol (PROAIR HFA/PROVENTIL HFA/VENTOLIN HFA) 108 (90 Base) MCG/ACT inhaler Inhale 2 puffs into the lungs every 4 hours as needed      apixaban ANTICOAGULANT (ELIQUIS ANTICOAGULANT) 5 MG tablet Take 1 tablet by mouth 2 times daily      atorvastatin (LIPITOR) 10 MG tablet Take 1 tablet by mouth daily      calcium carbonate (OS-SHINE) 1500 (600 Ca) MG tablet Take 600 mg by mouth      cetirizine (ZYRTEC) 10 MG tablet Take 10 mg by mouth daily      diltiazem ER (TIAZAC) 120 MG 24 hr ER beaded capsule Take 1 capsule (120 mg) by mouth daily 90 capsule 0    docusate sodium (DSS) 100 MG capsule Take 1 capsule by mouth 2 times daily      fluticasone (ARNUITY ELLIPTA) 50 MCG/ACT inhaler Inhale 1 puff into the lungs daily      ipratropium (ATROVENT) 0.02 % neb solution Inhale 0.5 mg into the lungs      metoprolol succinate ER (TOPROL XL) 50 MG 24 hr tablet Take 1 tablet by mouth daily      mometasone-formoterol (DULERA) 200-5 MCG/ACT inhaler Inhale 2 puffs into the lungs      montelukast (SINGULAIR) 10 MG tablet Take 1 tablet by mouth At Bedtime      olopatadine (PATANOL) 0.1 % ophthalmic solution 1 drop 2 times daily (Patient not taking: Reported on 4/6/2023)      omeprazole (PRILOSEC) 20 MG DR capsule TAKE 1 CAPSULE BY MOUTH TWICE DAILY. TAKE 1 HOUR BEFORE MEAL.      tiotropium (SPIRIVA HANDIHALER) 18 MCG inhaled capsule Inhale 18 mcg into the lungs         ALLERGIES     Allergies   Allergen Reactions    Sulfa Antibiotics Unknown    Diazepam Other (See Comments)     Makes more anxious    Meperidine Nausea and Vomiting and Unknown    Morphine Nausea and Unknown    Penicillins Itching    Sulfamethoxazole-Trimethoprim Unknown    Zafirlukast        PAST MEDICAL HISTORY:  No past medical history on file.    PAST SURGICAL HISTORY:  No past surgical history on file.    FAMILY HISTORY:  Family History    Problem Relation Age of Onset    Heart Defect Mother     Myocardial Infarction Father        SOCIAL HISTORY:  Social History     Socioeconomic History    Marital status:    Tobacco Use    Smoking status: Never    Smokeless tobacco: Never       Review of Systems:  Skin:          Eyes:         ENT:         Respiratory:          Cardiovascular:         Gastroenterology:        Genitourinary:         Musculoskeletal:         Neurologic:         Psychiatric:         Heme/Lymph/Imm:         Endocrine:           Physical Exam:  Vitals: There were no vitals taken for this visit.    Constitutional:           Skin:             Head:           Eyes:           Lymph:      ENT:           Neck:           Respiratory:            Cardiac:                                                           GI:           Extremities and Muscular Skeletal:                 Neurological:           Psych:           CC  Melo Louis MD  [address] [facility] [address]  [place] [address]      Thank you for allowing me to participate in the care of your patient.      Sincerely,     Hudson Mcmillan MD     [facility]

## 2023-06-20 NOTE — PROGRESS NOTES
PHYSICIAN NOTE:  This visit was completed in person at the [facility].      I had the pleasure of seeing Ms Margareth Hogue for evaluation of atrial tachycardia/flutter.  She has been referred by Dr. Melo Louis.    This pleasant 78-year-old female has long h/o symptomatic paroxysmal atrial fibrillation and underwent catheter ablation in 2013 ([facility] in [place], [place]).  Symptoms markedly improved for few years but ultimately AF returned.  She was later started on propafenone 150 mg 3 times daily and Eliquis.  Reportedly intolerant of dronedarone and flecainide.      The patient was seen twice at the [facility] ER in May, 3/24 and 3/31/2023.  Both times she presented with symptomatic atrial tachycardia.  She required cardioversion.    She saw Dr. Louis in early April; on that day she was in atrial tachycardia/flutter with RVR.  Dr. Louis stopped propafenone, as this was no longer effective in controlling the patient's atrial arrhythmias.  He started diltiazem  mg daily for rate control.    The patient said that she has experienced several episodes of heart racing recently, including one event that lasted 2 days.  Her heart rate was between 100 and 150 and she did not feel well.  However, she did not feel unwell enough to go to the ER.  She also stated that her BP has been high recently.    Past medical history: Dyslipidemia, right nephrectomy for renal cell carcinoma, pulmonary sarcoidosis, GERD, asthma, history of TIA, AF as above.    Social & family history:  Dad had CAD, she does not recall when he had his first MI.  The patient is a non-smoker and drinks minimal alcohol.  Lives with , independently, in senior building.      PHYSICAL EXAMINATION:  Vital signs: 120/80, 89, 61.7 kg, BMI 23.7  General:  in no apparent distress, very pleasant lady, somewhat vague on details  ENT/Mouth:  no nasal discharge.  Eyes:  normal conjunctivae.   Neck:  no thyromegaly or  lymphadenopathy.  Chest/Lungs:  patient is not dyspneic.  Lungs CTA, without rales or wheezing  Cardiovascular: Normal JVP, rhythm is regular.  No gallop, murmur or rub.    Abdomen:  no abdominal distention.  Soft, negative HJR.    Extremities:  no edema  Skin:  no xanthelasma.    Neurologic:  alert & oriented x 3.  No tremor.    Vascular:  2+ carotids without bruits.  2+ radials.        DIAGNOSTIC STUDIES:  Laboratory studies: Sodium 129, potassium 4.3, creatinine 0.65, proBNP 2315, troponin 16 - 15, hematocrit 40.6%  ECG: Sinus rhythm with RBBB, PAC  Echocardiogram (03/2023): EF 60%, normal RV, normal atrial size, mild aortic sclerosis without stenosis.  Cardiac monitor (May 2023): 3-day monitor showed 3032 SVT runs longest of 53 seconds, frequent PACs (6%), occasional PVCs (1.4%).  No symptomatic activations by the patient.      IMPRESSION:  1. Paroxysmal atrial tachycardia.  She had previous PVI (2013).  Most likely her clinical arrhythmia represents PV conduction recovery.  Currently on diltiazem  mg daily, but she continues to have RVR during episodes.  She is tolerating anticoagulation well, except for minor bruising.  [id number]-AQEq is at least 5.  2. Pulmonary sarcoidosis.  She follows with pulmonary.    RECOMMENDATIONS:  1. Increase diltiazem  mg from once daily to twice daily.  A new prescription was sent to the pharmacy.  2. Continue apixaban.  3. Follow-up with EP ANTONY in 5-6 weeks.  If she continues to have frequent episodes of arrhythmia, we would consider a trial of sotalol.    It was my pleasure seeing this delightful patient.  Please feel free to call with any questions.   Medical decision-making was of moderate complexity.     Hudson Mcmillan MD, [facility]        Encounter Diagnosis   Name Primary?     Atrial flutter with rapid ventricular response (H)        CURRENT MEDICATIONS:  Current Outpatient Medications   Medication Sig Dispense Refill     albuterol (PROAIR HFA/PROVENTIL  HFA/VENTOLIN HFA) 108 (90 Base) MCG/ACT inhaler Inhale 2 puffs into the lungs every 4 hours as needed       apixaban ANTICOAGULANT (ELIQUIS ANTICOAGULANT) 5 MG tablet Take 1 tablet by mouth 2 times daily       atorvastatin (LIPITOR) 10 MG tablet Take 1 tablet by mouth daily       calcium carbonate (OS-SHINE) 1500 (600 Ca) MG tablet Take 600 mg by mouth       cetirizine (ZYRTEC) 10 MG tablet Take 10 mg by mouth daily       diltiazem ER (TIAZAC) 120 MG 24 hr ER beaded capsule Take 1 capsule (120 mg) by mouth daily 90 capsule 0     docusate sodium (DSS) 100 MG capsule Take 1 capsule by mouth 2 times daily       fluticasone (ARNUITY ELLIPTA) 50 MCG/ACT inhaler Inhale 1 puff into the lungs daily       ipratropium (ATROVENT) 0.02 % neb solution Inhale 0.5 mg into the lungs       metoprolol succinate ER (TOPROL XL) 50 MG 24 hr tablet Take 1 tablet by mouth daily       mometasone-formoterol (DULERA) 200-5 MCG/ACT inhaler Inhale 2 puffs into the lungs       montelukast (SINGULAIR) 10 MG tablet Take 1 tablet by mouth At Bedtime       olopatadine (PATANOL) 0.1 % ophthalmic solution 1 drop 2 times daily (Patient not taking: Reported on 4/6/2023)       omeprazole (PRILOSEC) 20 MG DR capsule TAKE 1 CAPSULE BY MOUTH TWICE DAILY. TAKE 1 HOUR BEFORE MEAL.       tiotropium (SPIRIVA HANDIHALER) 18 MCG inhaled capsule Inhale 18 mcg into the lungs         ALLERGIES     Allergies   Allergen Reactions     Sulfa Antibiotics Unknown     Diazepam Other (See Comments)     Makes more anxious     Meperidine Nausea and Vomiting and Unknown     Morphine Nausea and Unknown     Penicillins Itching     Sulfamethoxazole-Trimethoprim Unknown     Zafirlukast        PAST MEDICAL HISTORY:  No past medical history on file.    PAST SURGICAL HISTORY:  No past surgical history on file.    FAMILY HISTORY:  Family History   Problem Relation Age of Onset     Heart Defect Mother      Myocardial Infarction Father        SOCIAL HISTORY:  Social History      Socioeconomic History     Marital status:    Tobacco Use     Smoking status: Never     Smokeless tobacco: Never       Review of Systems:  Skin:          Eyes:         ENT:         Respiratory:          Cardiovascular:         Gastroenterology:        Genitourinary:         Musculoskeletal:         Neurologic:         Psychiatric:         Heme/Lymph/Imm:         Endocrine:           Physical Exam:  Vitals: There were no vitals taken for this visit.    Constitutional:           Skin:             Head:           Eyes:           Lymph:      ENT:           Neck:           Respiratory:            Cardiac:                                                           GI:           Extremities and Muscular Skeletal:                 Neurological:           Psych:           CC  Melo Louis MD  [id number]  [place] [address]

## 2023-06-21 NOTE — TELEPHONE ENCOUNTER
6/21/23 Msg recd from Dr Mcmillan  Yes, increase diltiazem to bid.  She is having AT w RVR INTERMITTENTLY...   Spoke w pt and explained recommendations.  She will continue to check HR 1-2x/day for the next 7-10 days and call back if HR consistently < 60 or if she develops dizziness, fatigue or other adverse affect  .Pt voiced understanding and agreement with plan.   Juan 839 am

## 2023-08-26 ENCOUNTER — APPOINTMENT (OUTPATIENT)
Dept: GENERAL RADIOLOGY | Facility: CLINIC | Age: 78
End: 2023-08-26
Attending: EMERGENCY MEDICINE
Payer: COMMERCIAL

## 2023-08-26 ENCOUNTER — HOSPITAL ENCOUNTER (EMERGENCY)
Facility: CLINIC | Age: 78
Discharge: HOME | End: 2023-08-26
Attending: EMERGENCY MEDICINE | Admitting: EMERGENCY MEDICINE
Payer: COMMERCIAL

## 2023-08-26 VITALS
DIASTOLIC BLOOD PRESSURE: 83 MMHG | BODY MASS INDEX: 22.2 KG/M2 | WEIGHT: 130 LBS | HEIGHT: 64 IN | HEART RATE: 83 BPM | OXYGEN SATURATION: 99 % | RESPIRATION RATE: 28 BRPM | TEMPERATURE: 97.8 F | SYSTOLIC BLOOD PRESSURE: 129 MMHG

## 2023-08-26 DIAGNOSIS — S43.005A SHOULDER DISLOCATION, LEFT, INITIAL ENCOUNTER: ICD-10-CM

## 2023-08-26 PROCEDURE — 999N000065 XR SHOULDER LEFT PORT G/E 2 VIEWS

## 2023-08-26 PROCEDURE — 99285 EMERGENCY DEPT VISIT HI MDM: CPT | Mod: 25

## 2023-08-26 PROCEDURE — 73030 X-RAY EXAM OF SHOULDER: CPT | Mod: LT

## 2023-08-26 PROCEDURE — 23650 CLTX SHO DSLC W/MNPJ WO ANES: CPT | Mod: LT

## 2023-08-26 PROCEDURE — 250N000011 HC RX IP 250 OP 636: Performed by: EMERGENCY MEDICINE

## 2023-08-26 PROCEDURE — 999N000157 HC STATISTIC RCP TIME EA 10 MIN

## 2023-08-26 RX ORDER — PROPOFOL 10 MG/ML
1 INJECTION, EMULSION INTRAVENOUS ONCE
Status: COMPLETED | OUTPATIENT
Start: 2023-08-26 | End: 2023-08-26

## 2023-08-26 RX ORDER — FLUMAZENIL 0.1 MG/ML
0.2 INJECTION, SOLUTION INTRAVENOUS
Status: DISCONTINUED | OUTPATIENT
Start: 2023-08-26 | End: 2023-08-26 | Stop reason: HOSPADM

## 2023-08-26 RX ADMIN — PROPOFOL 120 MG: 10 INJECTION, EMULSION INTRAVENOUS at 20:52

## 2023-08-26 ASSESSMENT — ACTIVITIES OF DAILY LIVING (ADL)
ADLS_ACUITY_SCORE: 35
ADLS_ACUITY_SCORE: 33

## 2023-08-26 NOTE — ED PROVIDER NOTES
History   Chief Complaint:  Shoulder Pain     The history is provided by the patient and medical records.      Margareth Hogue is a 78 year old female with history of paroxysmal atrial fibrillation, sarcoidosis, hyperlipidemia, GERD, and renal cell carcinoma s/p right nephrectomy who presents with shoulder pain. The patient states she was playing cards and reached for something, causing her left shoulder to pop out.  Pain in left shoulder is brought on with any movement but is manageable at rest.  No other concerns such as numbness or weakness in the left arm.  She notes she has history of left shoulder arthroplasty, and has had frequent shoulder dislocations in the past. Presentation and symptoms today are reminiscent of previous dislocation. She denies history of COPD or sleep apnea.     Independent Historian:   None - Patient Only      Medications:    Albuterol inhaler   Eliquis   Lipitor  Diltiazem   Arnuity inhaler  Atrovent neb   Dulera inhaler  Singulair  Prilosec   Spiriva     Past Medical History:    Chronic insomnia   GERD  Adenomatous polyp of colon   Renal cell carcinoma   Hyperlipidemia  Moderate persistent asthma  Irritable bowel syndrome  Paroxysmal atrial fibrillation  Primary osteoarthritis   Lung sarcoidosis   Transient ischemic attack     Past Surgical History:    Atrial ablation  Nephrectomy, right   Shoulder arthroplasty, bilateral   Tonsillectomy  Appendectomy   Breast biopsy     Physical Exam   Patient Vitals for the past 24 hrs:   BP Temp Temp src Pulse Resp SpO2 Height Weight   08/26/23 2139 -- -- -- -- -- 99 % -- --   08/26/23 2136 -- -- -- -- -- 99 % -- --   08/26/23 2133 -- -- -- -- -- 100 % -- --   08/26/23 2130 129/83 -- -- 83 -- 100 % -- --   08/26/23 2129 120/83 -- -- 84 -- 100 % -- --   08/26/23 2124 -- -- -- 86 28 99 % -- --   08/26/23 2121 -- -- -- 84 10 99 % -- --   08/26/23 2118 -- -- -- 85 (!) 35 100 % -- --   08/26/23 2116 -- -- -- 82 16 99 % -- --   08/26/23 2115 (!) 149/98  "-- -- 82 18 100 % -- --   08/26/23 2112 (!) 138/92 -- -- 80 25 -- -- --   08/26/23 2109 (!) 133/95 -- -- 79 (!) 9 100 % -- --   08/26/23 2106 (!) 141/95 -- -- 78 (!) 8 100 % -- --   08/26/23 2103 (!) 174/105 -- -- 82 22 100 % -- --   08/26/23 2101 (!) 160/105 -- -- 83 28 100 % -- --   08/26/23 2100 (!) 160/105 -- -- 89 18 100 % -- --   08/26/23 2057 (!) 177/103 -- -- 82 17 100 % -- --   08/26/23 2054 (!) 159/100 -- -- 82 14 100 % -- --   08/26/23 2051 -- -- -- -- -- 100 % -- --   08/26/23 2048 -- -- -- -- -- 100 % -- --   08/26/23 2045 -- -- -- 81 15 100 % -- --   08/26/23 2042 -- -- -- -- -- 100 % -- --   08/26/23 1839 -- -- -- -- -- -- 1.613 m (5' 3.5\") 59 kg (130 lb)   08/26/23 1838 (!) 158/77 97.8  F (36.6  C) Temporal 77 16 99 % -- --      Physical Exam  VS: Reviewed per above  HENT: Mucous membranes moist  EYES: sclera anicteric  CV: Rate as noted,  regular rhythm.   RESP: Effort normal. Breath sounds are normal bilaterally.  NEURO: Alert, moving all extremities  MSK: Scaphoid deformity about the left anterior shoulder region.  Intact left radial pulse at the wrist.  SKIN: Warm and dry      Emergency Department Course   Imaging:  XR Shoulder Left Port G/E 2 Views   Final Result   IMPRESSION: Left total shoulder arthroplasty. Normal glenohumeral joint alignment, best visible on the transscapular Y view. No evidence of fracture.      XR Shoulder Left G/E 3 Views   Final Result   IMPRESSION: Posterior dislocation of the humeral component of a left total shoulder arthroplasty. No fracture visualized. No evidence of hardware loosening. Calcified left hilar lymph node sequela of prior granulomatous exposure.      Report per radiology     [facility]    -Dislocation - Upper Extremity    Date/Time: 8/26/2023 8:17 PM    Performed by: Junior Rodriguez MD  Authorized by: Junior Rodriguez MD    Risks, benefits and alternatives discussed.    ED EVALUATION:      Assessment Time: 8/26/2023 8:17 " PM      I have performed an Emergency Department Evaluation including taking a history and physical examination, this evaluation will be documented in the electronic medical record for this ED encounter.      ASA Class: Class 2- mild systemic disease, no acute problems, no functional limitations    Mallampati: Grade 1- soft palate, uvula, tonsillar pillars, and posterior pharyngeal wall visible    NPO Status: not NPO, emergent situation    UNIVERSAL PROTOCOL   Site Marked: Yes  Prior Images Obtained and Reviewed:  Yes  Required items: Required blood products, implants, devices and special equipment available    Patient identity confirmed:  Verbally with patient and arm band  Patient was reevaluated immediately before administering moderate or deep sedation or anesthesia  Confirmation Checklist:  Patient's identity using two indicators, relevant allergies, correct equipment/implants were available and procedure was appropriate and matched the consent or emergent situation  Time out: Immediately prior to the procedure a time out was called    Universal Protocol: the Joint Commission Universal Protocol was followed    Preparation: Patient was prepped and draped in usual sterile fashion      LOCATION     Location:  Shoulder    Shoulder location:  L shoulder    Shoulder dislocation type: posterior      Chronicity:  Recurrent    PRE PROCEDURE ASSESSMENT      Pre-procedure imaging:  X-ray    Imaging findings: dislocation present      Imaging findings: no fracture      Fracture of greater humeral tuberosity: no      SEDATION  Patient Sedated: Yes    Sedation:  Propofol  Vital signs: Vital signs monitored during sedation      ANESTHESIA (see MAR for exact dosages)      Anesthesia method:  None    PROCEDURE DETAILS      Manipulation performed: yes      Shoulder reduction method:  Traction and counter traction, external rotation and scapular manipulation    Reduction successful: yes      Reduction confirmed with imaging: yes       Immobilization:  Sling    Supplies used:  Sling    POST PROCEDURE DETAILS     Neurological function: normal      Distal perfusion: normal      Range of motion: improved        PROCEDURE    Patient Tolerance:  Patient tolerated the procedure well with no immediate complications  Length of time physician/provider present for 1:1 monitoring during sedation: 15     Emergency Department Course & Assessments:     Interventions:  Medications   flumazenil (ROMAZICON) injection 0.2 mg ( Intravenous Canceled Entry 8/26/23 2145)   propofol (DIPRIVAN) injection 10 mg/mL vial (120 mg Intravenous $Given 8/26/23 2052)      Assessments:  1901 I obtained history and performed an exam of the patient as documented above.  2011 I discussed benefits, alternatives, and risks of dislocation reduction and obtained consent from the patient.   2052 I performed a dislocation reduction, procedure noted above.    Independent Interpretation (X-rays, CTs, rhythm strip):  I reviewed the patient's post-reduction left shoulder x-ray. Successful reduction.     Consultations/Discussion of Management or Tests:  None       Disposition:  The patient was discharged to home.     Impression & Plan    Medical Decision Making:  Patient presents to the ER for evaluation of left shoulder pain and deformity that occurred while playing cards.  Vital signs reassuring.  Clinically it appears that she has a shoulder dislocation and this was confirmed on x-ray.  No apparent signs of neurovascular compromise in the distal left upper extremity.  Patient was consented for sedation and shoulder reduction, which was performed successfully per procedure note above.  She was placed in sling and after monitoring in the ER for an hour post sedation was deemed stable for discharge in the care of her .  Recommended outpatient orthopedic follow.  Return precautions discussed prior to discharge.    Diagnosis:    ICD-10-CM    1. Shoulder dislocation, left, initial  encounter  S43.005A          Scribe Disclosure:  I, Aide Torreza, am serving as a scribe at 6:55 PM on 8/26/2023 to document services personally performed by Junior Rodriguez MD based on my observations and the provider's statements to me.      Junior Rodriguez MD  08/26/23 [id number]

## 2023-08-26 NOTE — ED TRIAGE NOTES
Patient has an artifical left shoulder and went to reach for something and felt her shoulder pop out. Patient has had this happen multiple times before. CMS intact.

## 2023-08-27 NOTE — ED NOTES
Prior to moving pt to stab 2 found walking to sink and sticking finger down throat and spitting into sink.  When asked if she was ok, pt replied that she just needed to get rid of mucous. BUCKY Cardenas notified.  RN asked pt to get on cart to go to stab room.  RN assisted pt onto cart.  RN assisted pt with placement of pillow and blanket to make her arm comfortable.  RN gave pt warm blankets as pt stated she was cold. When RN returned to room pt had removed Pillow and blanket that were positioned as she asked and pt was crying.  RN got another pillow and warm blanket.

## 2023-08-27 NOTE — PROGRESS NOTES
RT was called for reduction of a left shoulder dislocation. Pt was placed on 10 L oxy mask, 5 LNC. The VS stayed stable throughout the procedure. The SPO2 100%, eTCO2 of 25.   We will continue to follow.    Miranda Philip, RT. 8/26/2023

## 2023-09-21 ENCOUNTER — VIRTUAL VISIT (OUTPATIENT)
Dept: CARDIOLOGY | Facility: CLINIC | Age: 78
End: 2023-09-21
Payer: COMMERCIAL

## 2023-09-21 DIAGNOSIS — I48.92 ATRIAL FLUTTER WITH RAPID VENTRICULAR RESPONSE (H): ICD-10-CM

## 2023-09-21 PROCEDURE — 99213 OFFICE O/P EST LOW 20 MIN: CPT | Mod: VID | Performed by: NURSE PRACTITIONER

## 2023-09-21 RX ORDER — FLUTICASONE FUROATE, UMECLIDINIUM BROMIDE AND VILANTEROL TRIFENATATE 100; 62.5; 25 UG/1; UG/1; UG/1
2 POWDER RESPIRATORY (INHALATION) DAILY
COMMUNITY

## 2023-09-21 NOTE — PROGRESS NOTES
"  Margareth is a 78 year old who is being evaluated via a billable video visit.      How would you like to obtain your AVS? Mail a copy  If the video visit is dropped, the invitation should be resent by: Text to cell phone: [phone]  Will anyone else be joining your video visit? No      Margareth is a 78 year old who is being evaluated via a billable video visit.      How would you like to obtain your AVS? Mail a copy  If the video visit is dropped, the invitation should be resent by: Text to cell phone: [phone]  Will anyone else be joining your video visit? No      Video-Visit Details    Type of service:  Video Visit   Video Start Time: 8:20 AM  Video End Time:8:27 AM    Originating Location (pt. Location): Home    Distant Location (provider location):  On-site  Platform used for Video Visit: Supers[employer]  Vitals - Patient Reported  Systolic (Patient Reported): 124  Diastolic (Patient Reported): 76  Weight (Patient Reported): 59 kg (130 lb)  Height (Patient Reported): 161.3 cm (5' 3.5\")  BMI (Based on Pt Reported Ht/Wt): 22.67  Pulse (Patient Reported): 81    Review Of Systems  Skin: NEGATIVE  Eyes:Ears/Nose/Throat: NEGATIVE  Respiratory: NEGATIVE  Cardiovascular: Edema in ankles and mostly right leg  Gastrointestinal: NEGATIVE  Genitourinary:NEGATIVE   Musculoskeletal: NEGATIVE  Neurologic: NEGATIVE  Psychiatric: NEGATIVE  Hematologic/Lymphatic/Immunologic: NEGATIVE  Endocrine:  NEGATIVE    Amy Nunes LPN    Cardiology Clinic Progress Note  Margareth Hogue MRN# [id number]   YOB: 1945 Age: 78 year old     Primary cardiologist: Dr. Mcmillan (EP) and Dr. Louis (general)    Reason for visit: Atrial fibrillation     History of presenting illness:    Margareth Hogue, a pleasant 78 year old patient with a past medical history of:    Symptomatic persistent atrial fibrillation: Status post PVI in [facility] in 2013.  Later started on propafenone 150 mg 3 times daily and Eliquis due to recurrent " A-fib.  Intolerant of Durata drone and flecainide.  Paroxysmal atrial tachycardia  [id number] score of 5 (age++, TIA++, gender) anticoagulated on apixaban  Dyslipidemia  Renal cell carcinoma status post right nephrectomy  Pulmonary sarcoidosis  GERD  TIA history    In March 2023 she presented to the emergency department on 2 occasions and was found to be symptomatic with atrial tachycardia recording cardioversions.  She was evaluated by Dr. Louis in 4/2023 and that day she was in atrial tachycardia/flutter with RVR.  Propafenone was discontinued given breakthrough arrhythmias and started on diltiazem  mg daily for rate control.    She was referred to Dr. Mcmillan and evaluated on 6/20/2023 with reported several episodes of heart racing lasting up to 2 days with heart rates ranging between 100-150 and feeling unwell.  At the visit he felt that the most likely presenting clinical arrhythmia was pulmonary vein conduction recovery with RVR despite being on diltiazem 120 mg daily.  It was recommended that she increase the dose to 120 mg twice daily and follow-up to discuss today.    Since her last office visit she has had no recurrent prolonged episodes of heart racing or elevated heart rate.  She did note 1 episode 2 weeks ago of sharp central chest pain radiating to her arms with flushing.  The symptoms passed within minutes and without any associated nausea, dizziness or palpitations.  She stated she checked her heart rate and it was normal.  She had a previous stress test in March that was negative for ischemia.    Diagnostic studies:   Nuclear stress test (3/2023): Negative for ischemia or infarct. LVEF 63% at   Echocardiogram (2/2023): LVEF 60%, No wall motion abnormalities at HP  Zio Patch (1/2023): Frequent SVT (AT)without atrial fibrillation          Assessment and Plan:     ASSESSMENT:    Atrial arrhythmias  History of symptomatic persistent atrial fibrillation and paroxysmal atrial tachycardia  Status  post PVI in 2013 with recurrent atrial fibrillation previously trialed on propafenone with breakthrough arrhythmias.  Therefore, rhythm control was abandoned and she was started on diltiazem 120 mg daily for rate control.  Ongoing symptomatic episodes of RVR despite diltiazem 120 mg daily and dose was increased to twice daily.  [id number] score of 5 (age++, TIA++, gender) anticoagulated on apixaban  Symptomatic improvement with diltiazem dose increase.    PLAN:     Continue present medical therapy  Return to clinic in 1 year or sooner if needed and could consider sotalol loading if recurrent atrial arrhythmias.            Review of Systems:     Negative unless noted in HPI     Today's clinic visit entailed:  Review of prior external [facility]) [facility] information from [employer]  reviewed  Review of the result(s) of each unique test - Echo, Nuc, Zio  40 minutes spent by me on the date of the encounter doing chart review, history and exam, documentation and further activities per the note  Provider  Link to MDM Help Grid     The level of medical decision making during this visit was of moderate complexity.           Physical Exam:     General Appearance:  No distress, normal body habitus, upright.    ENT/Mouth:  Membranes moist, no nasal discharge or bleeding gums. Normal head shape, no evidence of injury or laceration.    EYES:  No scleral icterus, normal conjunctivae    Neurologic:  Normal arm motion bilateral, no tremors. No evidence of focal defect.    Psychiatric:  Alert and oriented x3, calm         Medications:     Current Outpatient Medications   Medication Sig Dispense Refill    albuterol (PROAIR HFA/PROVENTIL HFA/VENTOLIN HFA) 108 (90 Base) MCG/ACT inhaler Inhale 2 puffs into the lungs every 4 hours as needed      apixaban ANTICOAGULANT (ELIQUIS ANTICOAGULANT) 5 MG tablet Take 1 tablet by mouth 2 times daily      atorvastatin (LIPITOR) 10 MG tablet Take 1 tablet by mouth daily       calcium carbonate (OS-SHINE) 1500 (600 Ca) MG tablet Take 600 mg by mouth      cetirizine (ZYRTEC) 10 MG tablet Take 10 mg by mouth daily      diltiazem ER (TIAZAC) 120 MG 24 hr ER beaded capsule Take 1 capsule (120 mg) by mouth 2 times daily 180 capsule 3    docusate sodium (DSS) 100 MG capsule Take 1 capsule by mouth 2 times daily      fluticasone (ARNUITY ELLIPTA) 50 MCG/ACT inhaler Inhale 1 puff into the lungs daily      ipratropium (ATROVENT) 0.02 % neb solution Inhale 0.5 mg into the lungs      mometasone-formoterol (DULERA) 200-5 MCG/ACT inhaler Inhale 2 puffs into the lungs      montelukast (SINGULAIR) 10 MG tablet Take 1 tablet by mouth At Bedtime      omeprazole (PRILOSEC) 20 MG DR capsule TAKE 1 CAPSULE BY MOUTH TWICE DAILY. TAKE 1 HOUR BEFORE MEAL.      tiotropium (SPIRIVA HANDIHALER) 18 MCG inhaled capsule Inhale 18 mcg into the lungs         Family History   Problem Relation Age of Onset    Heart Defect Mother     Myocardial Infarction Father        Social History     Socioeconomic History    Marital status:      Spouse name: Not on file    Number of children: Not on file    Years of education: Not on file    Highest education level: Not on file   Occupational History    Not on file   Tobacco Use    Smoking status: Never    Smokeless tobacco: Never   Substance and Sexual Activity    Alcohol use: Not on file     Comment: 3 oz a night    Drug use: Not on file    Sexual activity: Not on file   Other Topics Concern    Not on file   Social History Narrative    Not on file     Social Determinants of Health     Financial Resource Strain: Not on file   Food Insecurity: Not on file   Transportation Needs: Not on file   Physical Activity: Not on file   Stress: Not on file   Social Connections: Not on file   Interpersonal Safety: Not on file   Housing Stability: Not on file            Past Medical History:   No past medical history on file.           Past Surgical History:   No past surgical history on  file.           Allergies:   Sulfa antibiotics, Diazepam, Meperidine, Morphine, Penicillins, Sulfamethoxazole-trimethoprim, and Zafirlukast       Data:   Laboratory tests:    No lab results found.    Invalid input(s): CMP, CBC    Lab Results   Component Value Date    WBC 8.1 03/24/2023    RBC 4.71 03/24/2023    HGB 13.7 03/24/2023    HCT 40.6 03/24/2023    MCV 86 03/24/2023    MCH 29.1 03/24/2023    MCHC 33.7 03/24/2023    RDW 13.5 03/24/2023     03/24/2023       Lab Results   Component Value Date     (L) 03/24/2023    POTASSIUM 4.3 03/24/2023    CHLORIDE 99 03/24/2023    CO2 21 (L) 03/24/2023    ANIONGAP 9 03/24/2023     (H) 03/24/2023    BUN 11.6 03/24/2023    CR 0.65 03/24/2023    GFRESTIMATED 90 03/24/2023    SHINE 9.3 03/24/2023      No results found for: AST, ALT    No results found for: A1C    No results found for: INR    KIRILL Higuera [facility]  Pager: [phone]  RN phone: [phone]

## 2023-09-21 NOTE — LETTER
"9/21/2023    Nadira Rob MD  [address]  [facility] [address]    RE: Margareth Hogue       Dear Colleague,     I had the pleasure of seeing Margareth Hogue in the [facility].    Margareth is a 78 year old who is being evaluated via a billable video visit.      How would you like to obtain your AVS? Mail a copy  If the video visit is dropped, the invitation should be resent by: Text to cell phone: [phone]  Will anyone else be joining your video visit? No      Margareth is a 78 year old who is being evaluated via a billable video visit.      How would you like to obtain your AVS? Mail a copy  If the video visit is dropped, the invitation should be resent by: Text to cell phone: [phone]  Will anyone else be joining your video visit? No      Video-Visit Details    Type of service:  Video Visit   Video Start Time: 8:20 AM  Video End Time:8:27 AM    Originating Location (pt. Location): Home    Distant Location (provider location):  On-site  Platform used for Video Visit: [employer]  Vitals - Patient Reported  Systolic (Patient Reported): 124  Diastolic (Patient Reported): 76  Weight (Patient Reported): 59 kg (130 lb)  Height (Patient Reported): 161.3 cm (5' 3.5\")  BMI (Based on Pt Reported Ht/Wt): 22.67  Pulse (Patient Reported): 81    Review Of Systems  Skin: NEGATIVE  Eyes:Ears/Nose/Throat: NEGATIVE  Respiratory: NEGATIVE  Cardiovascular: Edema in ankles and mostly right leg  Gastrointestinal: NEGATIVE  Genitourinary:NEGATIVE   Musculoskeletal: NEGATIVE  Neurologic: NEGATIVE  Psychiatric: NEGATIVE  Hematologic/Lymphatic/Immunologic: NEGATIVE  Endocrine:  NEGATIVE    Amy Nunes LPN    Cardiology Clinic Progress Note  Margareth Hogue MRN# [id number]   YOB: 1945 Age: 78 year old     Primary cardiologist: Dr. Mcmillan (EP) and Dr. Louis (general)    Reason for visit: Atrial fibrillation     History of presenting illness:    Margareth Hogue, a pleasant 78 year old patient " with a past medical history of:    Symptomatic persistent atrial fibrillation: Status post PVI in [facility] in 2013.  Later started on propafenone 150 mg 3 times daily and Eliquis due to recurrent A-fib.  Intolerant of Durata drone and flecainide.  Paroxysmal atrial tachycardia  [id number] score of 5 (age++, TIA++, gender) anticoagulated on apixaban  Dyslipidemia  Renal cell carcinoma status post right nephrectomy  Pulmonary sarcoidosis  GERD  TIA history    In March 2023 she presented to the emergency department on 2 occasions and was found to be symptomatic with atrial tachycardia recording cardioversions.  She was evaluated by Dr. Louis in 4/2023 and that day she was in atrial tachycardia/flutter with RVR.  Propafenone was discontinued given breakthrough arrhythmias and started on diltiazem  mg daily for rate control.    She was referred to Dr. Mcmillan and evaluated on 6/20/2023 with reported several episodes of heart racing lasting up to 2 days with heart rates ranging between 100-150 and feeling unwell.  At the visit he felt that the most likely presenting clinical arrhythmia was pulmonary vein conduction recovery with RVR despite being on diltiazem 120 mg daily.  It was recommended that she increase the dose to 120 mg twice daily and follow-up to discuss today.    Since her last office visit she has had no recurrent prolonged episodes of heart racing or elevated heart rate.  She did note 1 episode 2 weeks ago of sharp central chest pain radiating to her arms with flushing.  The symptoms passed within minutes and without any associated nausea, dizziness or palpitations.  She stated she checked her heart rate and it was normal.  She had a previous stress test in March that was negative for ischemia.    Diagnostic studies:   Nuclear stress test (3/2023): Negative for ischemia or infarct. LVEF 63% at HP  Echocardiogram (2/2023): LVEF 60%, No wall motion abnormalities at HP  Zio Patch (1/2023):  Frequent SVT (AT)without atrial fibrillation          Assessment and Plan:     ASSESSMENT:    Atrial arrhythmias  History of symptomatic persistent atrial fibrillation and paroxysmal atrial tachycardia  Status post PVI in 2013 with recurrent atrial fibrillation previously trialed on propafenone with breakthrough arrhythmias.  Therefore, rhythm control was abandoned and she was started on diltiazem 120 mg daily for rate control.  Ongoing symptomatic episodes of RVR despite diltiazem 120 mg daily and dose was increased to twice daily.  [id number] score of 5 (age++, TIA++, gender) anticoagulated on apixaban  Symptomatic improvement with diltiazem dose increase.    PLAN:     Continue present medical therapy  Return to clinic in 1 year or sooner if needed and could consider sotalol loading if recurrent atrial arrhythmias.            Review of Systems:     Negative unless noted in HPI     Today's clinic visit entailed:  Review of prior external note(s) from - [facility] information from [employer]  reviewed  Review of the result(s) of each unique test - Echo, Nuc, Zio  40 minutes spent by me on the date of the encounter doing chart review, history and exam, documentation and further activities per the note  Provider  Link to [facility] Help Grid     The level of medical decision making during this visit was of moderate complexity.           Physical Exam:     General Appearance:  No distress, normal body habitus, upright.    ENT/Mouth:  Membranes moist, no nasal discharge or bleeding gums. Normal head shape, no evidence of injury or laceration.    EYES:  No scleral icterus, normal conjunctivae    Neurologic:  Normal arm motion bilateral, no tremors. No evidence of focal defect.    Psychiatric:  Alert and oriented x3, calm         Medications:     Current Outpatient Medications   Medication Sig Dispense Refill    albuterol (PROAIR HFA/PROVENTIL HFA/VENTOLIN HFA) 108 (90 Base) MCG/ACT inhaler Inhale 2 puffs into the  lungs every 4 hours as needed      apixaban ANTICOAGULANT (ELIQUIS ANTICOAGULANT) 5 MG tablet Take 1 tablet by mouth 2 times daily      atorvastatin (LIPITOR) 10 MG tablet Take 1 tablet by mouth daily      calcium carbonate (OS-SHINE) 1500 (600 Ca) MG tablet Take 600 mg by mouth      cetirizine (ZYRTEC) 10 MG tablet Take 10 mg by mouth daily      diltiazem ER (TIAZAC) 120 MG 24 hr ER beaded capsule Take 1 capsule (120 mg) by mouth 2 times daily 180 capsule 3    docusate sodium (DSS) 100 MG capsule Take 1 capsule by mouth 2 times daily      fluticasone (ARNUITY ELLIPTA) 50 MCG/ACT inhaler Inhale 1 puff into the lungs daily      ipratropium (ATROVENT) 0.02 % neb solution Inhale 0.5 mg into the lungs      mometasone-formoterol (DULERA) 200-5 MCG/ACT inhaler Inhale 2 puffs into the lungs      montelukast (SINGULAIR) 10 MG tablet Take 1 tablet by mouth At Bedtime      omeprazole (PRILOSEC) 20 MG DR capsule TAKE 1 CAPSULE BY MOUTH TWICE DAILY. TAKE 1 HOUR BEFORE MEAL.      tiotropium (SPIRIVA HANDIHALER) 18 MCG inhaled capsule Inhale 18 mcg into the lungs         Family History   Problem Relation Age of Onset    Heart Defect Mother     Myocardial Infarction Father        Social History     Socioeconomic History    Marital status:      Spouse name: Not on file    Number of children: Not on file    Years of education: Not on file    Highest education level: Not on file   Occupational History    Not on file   Tobacco Use    Smoking status: Never    Smokeless tobacco: Never   Substance and Sexual Activity    Alcohol use: Not on file     Comment: 3 oz a night    Drug use: Not on file    Sexual activity: Not on file   Other Topics Concern    Not on file   Social History Narrative    Not on file     Social Determinants of Health     Financial Resource Strain: Not on file   Food Insecurity: Not on file   Transportation Needs: Not on file   Physical Activity: Not on file   Stress: Not on file   Social Connections: Not on file    Interpersonal Safety: Not on file   Housing Stability: Not on file            Past Medical History:   No past medical history on file.           Past Surgical History:   No past surgical history on file.           Allergies:   Sulfa antibiotics, Diazepam, Meperidine, Morphine, Penicillins, Sulfamethoxazole-trimethoprim, and Zafirlukast       Data:   Laboratory tests:    No lab results found.    Invalid input(s): CMP, CBC    Lab Results   Component Value Date    WBC 8.1 03/24/2023    RBC 4.71 03/24/2023    HGB 13.7 03/24/2023    HCT 40.6 03/24/2023    MCV 86 03/24/2023    MCH 29.1 03/24/2023    MCHC 33.7 03/24/2023    RDW 13.5 03/24/2023     03/24/2023       Lab Results   Component Value Date     (L) 03/24/2023    POTASSIUM 4.3 03/24/2023    CHLORIDE 99 03/24/2023    CO2 21 (L) 03/24/2023    ANIONGAP 9 03/24/2023     (H) 03/24/2023    BUN 11.6 03/24/2023    CR 0.65 03/24/2023    GFRESTIMATED 90 03/24/2023    SHINE 9.3 03/24/2023      No results found for: AST, ALT    No results found for: A1C    No results found for: INR    KIRILL Higuera CNP  [facility]  Pager: [phone]  RN phone: [phone]      Thank you for allowing me to participate in the care of your patient.      Sincerely,     KIRILL Higuera CNP     [facility]  cc:   Hudson Mcmillan MD  [address]  [place] [address]

## 2024-01-29 ENCOUNTER — MEDICAL CORRESPONDENCE (OUTPATIENT)
Dept: HEALTH INFORMATION MANAGEMENT | Facility: CLINIC | Age: 79
End: 2024-01-29
Payer: COMMERCIAL

## 2024-01-29 DIAGNOSIS — I38 DIASTOLIC MURMUR: Primary | ICD-10-CM

## 2024-02-12 ENCOUNTER — HOSPITAL ENCOUNTER (OUTPATIENT)
Dept: CARDIOLOGY | Facility: CLINIC | Age: 79
Discharge: HOME | End: 2024-02-12
Attending: FAMILY MEDICINE | Admitting: FAMILY MEDICINE
Payer: COMMERCIAL

## 2024-02-12 DIAGNOSIS — I38 DIASTOLIC MURMUR: ICD-10-CM

## 2024-02-12 LAB — LVEF ECHO: NORMAL

## 2024-02-12 PROCEDURE — 93306 TTE W/DOPPLER COMPLETE: CPT

## 2024-02-12 PROCEDURE — 93306 TTE W/DOPPLER COMPLETE: CPT | Mod: 26 | Performed by: INTERNAL MEDICINE

## 2024-03-12 ENCOUNTER — TRANSFERRED RECORDS (OUTPATIENT)
Dept: HEALTH INFORMATION MANAGEMENT | Facility: CLINIC | Age: 79
End: 2024-03-12

## 2024-06-11 DIAGNOSIS — I48.92 ATRIAL FLUTTER WITH RAPID VENTRICULAR RESPONSE (H): ICD-10-CM

## 2024-06-11 RX ORDER — DILTIAZEM HYDROCHLORIDE 120 MG/1
120 CAPSULE, EXTENDED RELEASE ORAL 2 TIMES DAILY
Qty: 180 CAPSULE | Refills: 0 | Status: SHIPPED | OUTPATIENT
Start: 2024-06-11 | End: 2024-09-05

## 2024-07-18 ENCOUNTER — HOSPITAL ENCOUNTER (EMERGENCY)
Facility: CLINIC | Age: 79
Discharge: HOME | End: 2024-07-18
Attending: EMERGENCY MEDICINE | Admitting: EMERGENCY MEDICINE
Payer: COMMERCIAL

## 2024-07-18 ENCOUNTER — APPOINTMENT (OUTPATIENT)
Dept: CT IMAGING | Facility: CLINIC | Age: 79
End: 2024-07-18
Attending: EMERGENCY MEDICINE
Payer: COMMERCIAL

## 2024-07-18 VITALS
HEIGHT: 63 IN | BODY MASS INDEX: 23.04 KG/M2 | TEMPERATURE: 97.6 F | RESPIRATION RATE: 16 BRPM | DIASTOLIC BLOOD PRESSURE: 84 MMHG | SYSTOLIC BLOOD PRESSURE: 133 MMHG | HEART RATE: 76 BPM | WEIGHT: 130 LBS | OXYGEN SATURATION: 100 %

## 2024-07-18 DIAGNOSIS — R51.9 ACUTE NONINTRACTABLE HEADACHE, UNSPECIFIED HEADACHE TYPE: ICD-10-CM

## 2024-07-18 LAB
ANION GAP SERPL CALCULATED.3IONS-SCNC: 7 MMOL/L (ref 7–15)
BASOPHILS # BLD AUTO: 0 10E3/UL (ref 0–0.2)
BASOPHILS NFR BLD AUTO: 0 %
BUN SERPL-MCNC: 13.7 MG/DL (ref 8–23)
CALCIUM SERPL-MCNC: 9.3 MG/DL (ref 8.8–10.4)
CHLORIDE SERPL-SCNC: 96 MMOL/L (ref 98–107)
CREAT SERPL-MCNC: 0.64 MG/DL (ref 0.51–0.95)
EGFRCR SERPLBLD CKD-EPI 2021: 89 ML/MIN/1.73M2
EOSINOPHIL # BLD AUTO: 0.1 10E3/UL (ref 0–0.7)
EOSINOPHIL NFR BLD AUTO: 1 %
ERYTHROCYTE [DISTWIDTH] IN BLOOD BY AUTOMATED COUNT: 13.9 % (ref 10–15)
GLUCOSE SERPL-MCNC: 108 MG/DL (ref 70–99)
HCO3 SERPL-SCNC: 27 MMOL/L (ref 22–29)
HCT VFR BLD AUTO: 36.4 % (ref 35–47)
HGB BLD-MCNC: 11.8 G/DL (ref 11.7–15.7)
HOLD SPECIMEN: NORMAL
HOLD SPECIMEN: NORMAL
IMM GRANULOCYTES # BLD: 0 10E3/UL
IMM GRANULOCYTES NFR BLD: 0 %
LYMPHOCYTES # BLD AUTO: 1.7 10E3/UL (ref 0.8–5.3)
LYMPHOCYTES NFR BLD AUTO: 23 %
MCH RBC QN AUTO: 28 PG (ref 26.5–33)
MCHC RBC AUTO-ENTMCNC: 32.4 G/DL (ref 31.5–36.5)
MCV RBC AUTO: 87 FL (ref 78–100)
MONOCYTES # BLD AUTO: 0.7 10E3/UL (ref 0–1.3)
MONOCYTES NFR BLD AUTO: 9 %
NEUTROPHILS # BLD AUTO: 4.7 10E3/UL (ref 1.6–8.3)
NEUTROPHILS NFR BLD AUTO: 66 %
NRBC # BLD AUTO: 0 10E3/UL
NRBC BLD AUTO-RTO: 0 /100
PLATELET # BLD AUTO: 345 10E3/UL (ref 150–450)
POTASSIUM SERPL-SCNC: 4.2 MMOL/L (ref 3.4–5.3)
RBC # BLD AUTO: 4.21 10E6/UL (ref 3.8–5.2)
SODIUM SERPL-SCNC: 130 MMOL/L (ref 135–145)
WBC # BLD AUTO: 7.1 10E3/UL (ref 4–11)

## 2024-07-18 PROCEDURE — 99285 EMERGENCY DEPT VISIT HI MDM: CPT | Mod: 25

## 2024-07-18 PROCEDURE — 70496 CT ANGIOGRAPHY HEAD: CPT

## 2024-07-18 PROCEDURE — 85025 COMPLETE CBC W/AUTO DIFF WBC: CPT | Performed by: EMERGENCY MEDICINE

## 2024-07-18 PROCEDURE — 250N000009 HC RX 250: Performed by: EMERGENCY MEDICINE

## 2024-07-18 PROCEDURE — 80048 BASIC METABOLIC PNL TOTAL CA: CPT | Performed by: EMERGENCY MEDICINE

## 2024-07-18 PROCEDURE — 250N000011 HC RX IP 250 OP 636: Performed by: EMERGENCY MEDICINE

## 2024-07-18 PROCEDURE — 36415 COLL VENOUS BLD VENIPUNCTURE: CPT | Performed by: EMERGENCY MEDICINE

## 2024-07-18 PROCEDURE — 70450 CT HEAD/BRAIN W/O DYE: CPT | Mod: XU

## 2024-07-18 RX ORDER — IOPAMIDOL 755 MG/ML
67 INJECTION, SOLUTION INTRAVASCULAR ONCE
Status: COMPLETED | OUTPATIENT
Start: 2024-07-18 | End: 2024-07-18

## 2024-07-18 RX ADMIN — IOPAMIDOL 67 ML: 755 INJECTION, SOLUTION INTRAVENOUS at 14:41

## 2024-07-18 RX ADMIN — SODIUM CHLORIDE 90 ML: 9 INJECTION, SOLUTION INTRAVENOUS at 14:42

## 2024-07-18 ASSESSMENT — ACTIVITIES OF DAILY LIVING (ADL)
ADLS_ACUITY_SCORE: 35

## 2024-07-18 ASSESSMENT — COLUMBIA-SUICIDE SEVERITY RATING SCALE - C-SSRS
2. HAVE YOU ACTUALLY HAD ANY THOUGHTS OF KILLING YOURSELF IN THE PAST MONTH?: NO
1. IN THE PAST MONTH, HAVE YOU WISHED YOU WERE DEAD OR WISHED YOU COULD GO TO SLEEP AND NOT WAKE UP?: NO
6. HAVE YOU EVER DONE ANYTHING, STARTED TO DO ANYTHING, OR PREPARED TO DO ANYTHING TO END YOUR LIFE?: NO

## 2024-07-18 NOTE — DISCHARGE INSTRUCTIONS
I recommend 600 mg of Tylenol every 6 hours for headache and staying well-hydrated.  Should you develop worsening headache, weakness, numbness, vision changes start throwing up or passout, you should return to the emergency department.  Otherwise, follow-up with your primary doctor for recheck.

## 2024-07-18 NOTE — ED PROVIDER NOTES
"  Emergency Department Note      History of Present Illness     Chief Complaint   Headache and Neck Pain    HPI   Margareth Hogue is a 79 year old female on Eliquis with a history of atrial fibrillation, hyperlipidemia, TIA who presents with a headache and neck pain which began earlier today as she was walking in the park. She describes the headache as localized to the top and back of her head, and she also notes experiencing neck pain which she describes as stiffness. She has had associated nausea and paresthesias in her fingers as well. She reports that one week ago, she was awakened by a headache which she describes as a stabbing pain behind her left eye. She also had paresthesias in her fingers at this time. This headache gradually improved throughout the week but did not entirely resolve. She denies vision changes. She denies history of similar headaches. She states that she has arthritis, asthma, and atrial fibrillation for which she takes Eliquis.    Independent Historian   None    Review of External Notes   None    Past Medical History     Medical History and Problem List   Chronic insomnia  Bilateral sensorineural hearing loss  GERD  Hyperlipidemia  IBS  Asthma  Atrial fibrillation  Arthritis  Sarcoidosis  TIA  Seasonal allergies    Medications   Albuterol inhaler  Eliquis  Lipitor  Zyrtec  Diltiazem  Fluticasone inhaler  Dulera inhaler  Singulair  Omeprazole  Tiotropium inhaler  Trelegy Ellipta inhaler    Surgical History   Atrial ablation  Total nephrectomy, right  Shoulder arthroplasty, bilateral  Foot surgery, bilateral  Tonsillectomy  Appendectomy  Breast biopsy  Small intestine surgery    Physical Exam     Patient Vitals for the past 24 hrs:   BP Temp Temp src Pulse Resp SpO2 Height Weight   07/18/24 1730 133/84 -- -- 76 -- -- -- --   07/18/24 1700 126/81 -- -- 74 -- -- -- --   07/18/24 1632 131/78 -- -- 76 -- -- -- --   07/18/24 1256 (!) 150/65 97.6  F (36.4  C) Temporal 87 16 100 % 1.6 m (5' 3\") 59 " kg (130 lb)     Physical Exam  Constitutional: Alert, attentive, GCS 15  Eyes: EOM are normal, anicteric, conjugate gaze  CV: regular rate and rhythm. Distal extremities warm and well perfused.  Chest: Effort normal and breath sounds clear without wheezing or rales, symmetric bilaterally. Nonlabored breathing on room air.  GI:  non tender. No distension. No guarding or rebound.    MSK: No LE edema, no tenderness to palpation of BLE.  Neurological: Alert, attentive, moving all extremities equally.   strength symmetric.  Skin: Skin is warm and dry.    Diagnostics     Lab Results   Labs Ordered and Resulted from Time of ED Arrival to Time of ED Departure   BASIC METABOLIC PANEL - Abnormal       Result Value    Sodium 130 (*)     Potassium 4.2      Chloride 96 (*)     Carbon Dioxide (CO2) 27      Anion Gap 7      Urea Nitrogen 13.7      Creatinine 0.64      GFR Estimate 89      Calcium 9.3      Glucose 108 (*)    CBC WITH PLATELETS AND DIFFERENTIAL    WBC Count 7.1      RBC Count 4.21      Hemoglobin 11.8      Hematocrit 36.4      MCV 87      MCH 28.0      MCHC 32.4      RDW 13.9      Platelet Count 345      % Neutrophils 66      % Lymphocytes 23      % Monocytes 9      % Eosinophils 1      % Basophils 0      % Immature Granulocytes 0      NRBCs per 100 WBC 0      Absolute Neutrophils 4.7      Absolute Lymphocytes 1.7      Absolute Monocytes 0.7      Absolute Eosinophils 0.1      Absolute Basophils 0.0      Absolute Immature Granulocytes 0.0      Absolute NRBCs 0.0       Imaging   CTA Head Neck with Contrast   Final Result   IMPRESSION:    1. No high-grade stenosis or large vessel occlusion of the major   intracranial arteries.   2. No intracranial aneurysm or high flow vascular malformation.   3. Patent cervical carotid and vertebral arteries without significant   stenosis. No definite findings of dissection.   4. There is a 6-7 mm right upper lung zone pulmonary nodule that is   nonspecific. Recommend follow-up  chest CT.       URIEL TONY MD            SYSTEM ID:  [id number]      Head CT w/o contrast   Final Result   IMPRESSION:   1. No acute intracranial hemorrhage, extra axial fluid collection, or   mass effect.   2. Small focus of nonspecific hypoattenuation in the right corona   radiata, concerning for age-indeterminate ischemic change/infarct,   possibly chronic. Recommend clinical correlation. MRI may be helpful   for further evaluation if clinically warranted.   3. Mild presumed chronic small vessel ischemic changes.      URIEL TONY MD            SYSTEM ID:  [id number]        Independent Interpretation   I personally reviewed her head CT, see notes intracranial hemorrhage.    ED Course      Medications Administered   Medications   iopamidol (ISOVUE-370) solution 67 mL (67 mLs Intravenous $Given 7/18/24 1441)   Saline Flush (90 mLs Intravenous $Given 7/18/24 1442)     Discussion of Management   None    ED Course   ED Course as of 07/18/24 2051   Thu Jul 18, 2024   1313 I obtained history and performed an examination of the patient.    1748 The patient was rechecked and updated. Discussed results and plan of care.      Optional/Additional Documentation  None    Medical Decision Making / Diagnosis     CMS Diagnoses: None    MIPS   None    MDM   Margareth Hogue is a 79 year old female past medical history significant for A-fib on Eliquis, TIA, sarcoid presenting for evaluation of headache.  It sounds like she had a more rapid onset headache 1 week ago with persistent pain through the week and then worsened today prompting her visit.  She does endorse some mild neck pain.  No infectious symptoms, low suspicion for CNS infection especially back to meningitis given duration of her symptoms.  She was sent for CT imaging of her head as well as a CTA head and neck, this shows no evidence of intracranial hemorrhage, CTA shows no high-grade stenosis or aneurysm.  Concern for subarachnoid is very low suspicion given the  patient is anticoagulated the likelihood of a radiographically silent head bleed is low.  She did have a small hypodensity in the corona radiata concerning for most likely chronic infarct, she does not have any clear lateralizing deficits vision changes and I have low suspicion is this is the etiology of her headache.  I recommended Tylenol for pain, no indication for narcotic as well as PCP perhaps neurology follow-up.  Return precautions were reviewed including worsening headache, weakness or numbness in one-sided the other vision changes trouble speaking or swallowing.  Patient expressed understanding of this plan and she was discharged    Disposition   The patient was discharged.     Diagnosis     ICD-10-CM    1. Acute nonintractable headache, unspecified headache type  R51.9          Juan Larson MD  [employer]  8:51 PM 07/18/24     Scribe Disclosure:  I, Wendy Yuen, am serving as a scribe at 1:37 PM on 7/18/2024 to document services personally performed by Juan Larson MD based on my observations and the provider's statements to me.        Juan Larson MD  07/18/24 2051

## 2024-07-18 NOTE — ED TRIAGE NOTES
Pt presented to ed to be evaluated for head and neck pn that started when she was walking at around 9 am. Pt says this same thing happened to her about a week ago but it resolved on its own but that the pn was more severe last week. Pt endorses neck stiffness/ridgity but no new fevers. Pt headache is towards the top of her head. Pt also endorses some nausea and dizziness as well. Pt also says her right thumb is numb.      Triage Assessment (Adult)       Row Name 07/18/24 3510          Triage Assessment    Airway WDL WDL        Respiratory WDL    Respiratory WDL WDL        Cardiac WDL    Cardiac WDL WDL        Cognitive/Neuro/Behavioral WDL    Cognitive/Neuro/Behavioral WDL WDL

## 2024-09-05 DIAGNOSIS — I48.92 ATRIAL FLUTTER WITH RAPID VENTRICULAR RESPONSE (H): ICD-10-CM

## 2024-09-05 RX ORDER — DILTIAZEM HYDROCHLORIDE 120 MG/1
120 CAPSULE, EXTENDED RELEASE ORAL 2 TIMES DAILY
Qty: 180 CAPSULE | Refills: 0 | Status: SHIPPED | OUTPATIENT
Start: 2024-09-05 | End: 2024-09-23

## 2024-09-13 ENCOUNTER — TRANSFERRED RECORDS (OUTPATIENT)
Dept: HEALTH INFORMATION MANAGEMENT | Facility: CLINIC | Age: 79
End: 2024-09-13
Payer: COMMERCIAL

## 2024-09-23 ENCOUNTER — OFFICE VISIT (OUTPATIENT)
Dept: CARDIOLOGY | Facility: CLINIC | Age: 79
End: 2024-09-23
Payer: COMMERCIAL

## 2024-09-23 VITALS
BODY MASS INDEX: 22.53 KG/M2 | HEART RATE: 78 BPM | DIASTOLIC BLOOD PRESSURE: 80 MMHG | WEIGHT: 132 LBS | OXYGEN SATURATION: 97 % | SYSTOLIC BLOOD PRESSURE: 132 MMHG | HEIGHT: 64 IN

## 2024-09-23 DIAGNOSIS — E78.5 HYPERLIPIDEMIA LDL GOAL <130: Primary | ICD-10-CM

## 2024-09-23 DIAGNOSIS — I48.92 ATRIAL FLUTTER WITH RAPID VENTRICULAR RESPONSE (H): ICD-10-CM

## 2024-09-23 DIAGNOSIS — I48.0 PAROXYSMAL ATRIAL FIBRILLATION (H): ICD-10-CM

## 2024-09-23 PROCEDURE — 99214 OFFICE O/P EST MOD 30 MIN: CPT | Performed by: NURSE PRACTITIONER

## 2024-09-23 RX ORDER — ATORVASTATIN CALCIUM 10 MG/1
10 TABLET, FILM COATED ORAL DAILY
Qty: 90 TABLET | Refills: 3 | Status: SHIPPED | OUTPATIENT
Start: 2024-09-23

## 2024-09-23 RX ORDER — DILTIAZEM HYDROCHLORIDE 120 MG/1
120 CAPSULE, EXTENDED RELEASE ORAL 2 TIMES DAILY
Qty: 180 CAPSULE | Refills: 3 | Status: SHIPPED | OUTPATIENT
Start: 2024-09-23

## 2024-09-23 NOTE — LETTER
9/23/2024    Nadira Rob MD  [address]  [place] [address]    RE: Margareth Hogue       Dear Colleague,     I had the pleasure of seeing Margareth Hogue in the [facility].    Electrophysiology Clinic Progress Note  Margareth Hogue MRN# [id number]   YOB: 1945 Age: 79 year old     Primary cardiologist: Dr. Mcmillan (EP) and Dr. Louis (general)    Reason for visit: Annual follow up    History of presenting illness:    Margareth Hogue is a pleasant 79 year old patient with past medical history significant for:    Symptomatic atrial tachyarrhythmia (paroxysmal atrial fibrillation and atrial tachycardia): Status post PVI in [facility] in 2013.  Later started on propafenone 150 mg 3 times daily and Eliquis due to recurrent A-fib.  Intolerant of dronedarone and flecainide.  Status post DCCV x 2 in 3/2023 in the ED.  [id number] score of 5 (age++, TIA++, gender) anticoagulated on apixaban  Dyslipidemia  Renal cell carcinoma status post right nephrectomy  Pulmonary sarcoidosis  GERD  TIA history    In March 2023 she presented to the emergency department on 2 occasions and was found to be symptomatic with atrial tachycardia s/p cardioversion x 2.  She was evaluated by Dr. Louis in 4/2023 and that day she was in atrial tachycardia/flutter with RVR.  Propafenone was discontinued given breakthrough arrhythmias and started on diltiazem  mg daily for rate control.     She was referred to Dr. Mcmillan and evaluated on 6/20/2023 with reported several episodes of heart racing lasting up to 2 days with heart rates ranging between 100-150 and feeling unwell.  At the visit he felt that the most likely presenting clinical arrhythmia was pulmonary vein conduction recovery with RVR despite being on diltiazem 120 mg daily.  It was recommended that she increase the dose to 120 mg twice daily. In 3 month follow up she reported no recurrent prolonged episodes of heart racing or  elevated heart rate.      Today she returns for an annual follow up with no cardiac complains. An EKG at her PCPs office from 3/2024 noted SR at 69 bpm. Overall, she is able to complete ADLs without issues. She is active with [employer] and does go to the gym routinely.  Unfortunately, Eliquis does cause frequent bruising and now that she is 87 years old she does meet criteria for lowering the dose to 2.5 mg twice daily.  Her PCP did order and apixaban level of 72.       Diagnostic studies:   EKG (PCP/[facility]): SR at 69 bpm  Echocardiogram 2/2024: LVEF of 60 to 65% with no wall motion or valvular abnormalities.  Nuclear stress test (3/2023): Negative for ischemia or infarct. LVEF 63% at   Echocardiogram (2/2023): LVEF 60%, No wall motion abnormalities at HP  Zio Patch (1/2023): Frequent SVT (AT)without atrial fibrillation           Assessment and Plan:     ASSESSMENT:    Atrial Tachyarrhythmias (paroxysmal atrial fibrillation and paroxysmal atrial tachycardia)  Status post PVI in 2013 with recurrent atrial fibrillation previously trialed on propafenone with breakthrough arrhythmias.  Therefore, rhythm control was abandoned and she was started on diltiazem 120 mg daily for rate control.  Ongoing symptomatic episodes of RVR despite diltiazem 120 mg daily and dose was increased to twice daily.  [id number] score of 5 (age++, TIA++, gender) anticoagulated on apixaban 5 mg BID  Symptomatic improvement with diltiazem dose increase.  Hgb 12.3 and creatine 0.74 1/2024 at PCPs office    PLAN:     Decrease apixaban to 2.5 mg twice daily due to weight (60 kg) and age (79 years old).       Orders this Visit:  No orders of the defined types were placed in this encounter.    Orders Placed This Encounter   Medications     apixaban ANTICOAGULANT (ELIQUIS ANTICOAGULANT) 2.5 MG tablet     Sig: Take 1 tablet (2.5 mg) by mouth 2 times daily.     atorvastatin (LIPITOR) 10 MG tablet     Sig: Take 1 tablet (10 mg) by mouth daily.      "Dispense:  90 tablet     Refill:  3     diltiazem ER (TIAZAC) 120 MG 24 hr ER beaded capsule     Sig: Take 1 capsule (120 mg) by mouth 2 times daily.     Dispense:  180 capsule     Refill:  3     Medications Discontinued During This Encounter   Medication Reason     tiotropium (SPIRIVA HANDIHALER) 18 MCG inhaled capsule Alternate therapy     apixaban ANTICOAGULANT (ELIQUIS ANTICOAGULANT) 5 MG tablet Reorder (No AVS)     atorvastatin (LIPITOR) 10 MG tablet Reorder (No AVS)     diltiazem ER (TIAZAC) 120 MG 24 hr ER beaded capsule Reorder (No AVS)       Today's clinic visit entailed:  Review of prior external note(s) from - CareEverywhere information from [facility] reviewed  Review of the result(s) of each unique test - EKG, echo, Labs,   I spent a total of 30 minutes on the day of the visit.   Time spent by me doing chart review, history and exam, documentation and further activities per the note  Provider  Link to [facility] Help Grid     The level of medical decision making during this visit was of moderate complexity.           Review of Systems:     Review of Systems:  Skin:        Eyes:       ENT:       Respiratory:  Positive for    Cardiovascular:    Positive for;dizziness  Gastroenterology:      Genitourinary:       Musculoskeletal:       Neurologic:       Psychiatric:       Heme/Lymph/Imm:       Endocrine:                 Physical Exam:     Vitals: /80   Pulse 78   Ht 1.613 m (5' 3.5\")   Wt 59.9 kg (132 lb)   LMP  (LMP Unknown)   SpO2 97%   BMI 23.02 kg/m    Constitutional: Well nourished and in no apparent distress.  Eyes: Pupils equal, round.   HEENT: Normocephalic, atraumatic.   Neck: Supple.   Respiratory: Breathing non-labored. Lungs clear to auscultation bilaterally.  Cardiovascular:  Regular rate and rhythm, normal S1 and S2. No murmur   Skin: Warm, dry.   Extremities: No edema.  Neurologic: No gross motor deficits. Alert, awake, and oriented to person, place and time.  Psychiatric: Affect appropriate. "        CURRENT MEDICATIONS:  Current Outpatient Medications   Medication Sig Dispense Refill     apixaban ANTICOAGULANT (ELIQUIS ANTICOAGULANT) 2.5 MG tablet Take 1 tablet (2.5 mg) by mouth 2 times daily.       atorvastatin (LIPITOR) 10 MG tablet Take 1 tablet (10 mg) by mouth daily. 90 tablet 3     calcium carbonate (OS-SHINE) 1500 (600 Ca) MG tablet Take 600 mg by mouth       cetirizine (ZYRTEC) 10 MG tablet Take 10 mg by mouth daily       diltiazem ER (TIAZAC) 120 MG 24 hr ER beaded capsule Take 1 capsule (120 mg) by mouth 2 times daily. 180 capsule 3     docusate sodium (DSS) 100 MG capsule Take 1 capsule by mouth daily       ipratropium (ATROVENT) 0.02 % neb solution Inhale 0.5 mg into the lungs       omeprazole (PRILOSEC) 20 MG DR capsule TAKE 1 CAPSULE BY MOUTH TWICE DAILY. TAKE 1 HOUR BEFORE MEAL.       TRELEGY ELLIPTA 100-62.5-25 MCG/ACT oral inhaler Inhale 2 puffs into the lungs daily       albuterol (PROAIR HFA/PROVENTIL HFA/VENTOLIN HFA) 108 (90 Base) MCG/ACT inhaler Inhale 2 puffs into the lungs every 4 hours as needed       fluticasone (ARNUITY ELLIPTA) 50 MCG/ACT inhaler Inhale 1 puff into the lungs daily (Patient not taking: Reported on 9/21/2023)       mometasone-formoterol (DULERA) 200-5 MCG/ACT inhaler Inhale 2 puffs into the lungs (Patient not taking: Reported on 9/21/2023)       montelukast (SINGULAIR) 10 MG tablet Take 1 tablet by mouth At Bedtime (Patient not taking: Reported on 9/23/2024)         ALLERGIES  Allergies   Allergen Reactions     Sulfa Antibiotics Unknown     Diazepam Other (See Comments)     Makes more anxious     Meperidine Nausea and Vomiting and Unknown     Morphine Nausea and Unknown     Penicillins Itching     Sulfamethoxazole-Trimethoprim Unknown     Zafirlukast          PAST MEDICAL HISTORY:  No past medical history on file.    PAST SURGICAL HISTORY:  No past surgical history on file.    FAMILY HISTORY:  Family History   Problem Relation Age of Onset     Heart Defect Mother       Myocardial Infarction Father        SOCIAL HISTORY:  Social History     Socioeconomic History     Marital status:    Tobacco Use     Smoking status: Never     Smokeless tobacco: Never               Thank you for allowing me to participate in the care of your patient.      Sincerely,     KIRILL Higuera CNP     [facility]  cc:   KIRILL Rutherford CNP  [address]  [place] [address]

## 2024-09-23 NOTE — PATIENT INSTRUCTIONS
Today's Recommendations    Monitor blood pressure. Please let me know if it is consistently elevated an on going symptoms  Decrease Eliquis to 2.5 mg twice a day  Continue all medications without changes  Please follow up with cardiology in 1 year.    Please send or call for further questions or concerns.     It was a pleasure to see you today.     Chun Junior, APRN, CNP    EP [phone]  General scheduling and after hours number [phone]  EP scheduling [phone]

## 2024-09-23 NOTE — PROGRESS NOTES
Electrophysiology Clinic Progress Note  Margareth Hogue MRN# [id number]   YOB: 1945 Age: 79 year old     Primary cardiologist: Dr. Mcmillan (EP) and Dr. Louis (general)    Reason for visit: Annual follow up    History of presenting illness:    Margareth Hogue is a pleasant 79 year old patient with past medical history significant for:    Symptomatic atrial tachyarrhythmia (paroxysmal atrial fibrillation and atrial tachycardia): Status post PVI in [facility] in 2013.  Later started on propafenone 150 mg 3 times daily and Eliquis due to recurrent A-fib.  Intolerant of dronedarone and flecainide.  Status post DCCV x 2 in 3/2023 in the ED.  [id number] score of 5 (age++, TIA++, gender) anticoagulated on apixaban  Dyslipidemia  Renal cell carcinoma status post right nephrectomy  Pulmonary sarcoidosis  GERD  TIA history    In March 2023 she presented to the emergency department on 2 occasions and was found to be symptomatic with atrial tachycardia s/p cardioversion x 2.  She was evaluated by Dr. Louis in 4/2023 and that day she was in atrial tachycardia/flutter with RVR.  Propafenone was discontinued given breakthrough arrhythmias and started on diltiazem  mg daily for rate control.     She was referred to Dr. Mcmillan and evaluated on 6/20/2023 with reported several episodes of heart racing lasting up to 2 days with heart rates ranging between 100-150 and feeling unwell.  At the visit he felt that the most likely presenting clinical arrhythmia was pulmonary vein conduction recovery with RVR despite being on diltiazem 120 mg daily.  It was recommended that she increase the dose to 120 mg twice daily. In 3 month follow up she reported no recurrent prolonged episodes of heart racing or elevated heart rate.      Today she returns for an annual follow up with no cardiac complains. An EKG at her PCPs office from 3/2024 noted SR at 69 bpm. Overall, she is able to complete ADLs without issues. She  is active with [employer] and does go to the gym routinely.  Unfortunately, Eliquis does cause frequent bruising and now that she is 87 years old she does meet criteria for lowering the dose to 2.5 mg twice daily.  Her PCP did order and apixaban level of 72.       Diagnostic studies:   EKG (PCP/[facility]): SR at 69 bpm  Echocardiogram 2/2024: LVEF of 60 to 65% with no wall motion or valvular abnormalities.  Nuclear stress test (3/2023): Negative for ischemia or infarct. LVEF 63% at   Echocardiogram (2/2023): LVEF 60%, No wall motion abnormalities at [facility]  Zio Patch (1/2023): Frequent SVT (AT)without atrial fibrillation           Assessment and Plan:     ASSESSMENT:    Atrial Tachyarrhythmias (paroxysmal atrial fibrillation and paroxysmal atrial tachycardia)  Status post PVI in 2013 with recurrent atrial fibrillation previously trialed on propafenone with breakthrough arrhythmias.  Therefore, rhythm control was abandoned and she was started on diltiazem 120 mg daily for rate control.  Ongoing symptomatic episodes of RVR despite diltiazem 120 mg daily and dose was increased to twice daily.  [id number] score of 5 (age++, TIA++, gender) anticoagulated on apixaban 5 mg BID  Symptomatic improvement with diltiazem dose increase.  Hgb 12.3 and creatine 0.74 1/2024 at PCPs office    PLAN:     Decrease apixaban to 2.5 mg twice daily due to weight (60 kg) and age (79 years old).       Orders this Visit:  No orders of the defined types were placed in this encounter.    Orders Placed This Encounter   Medications    apixaban ANTICOAGULANT (ELIQUIS ANTICOAGULANT) 2.5 MG tablet     Sig: Take 1 tablet (2.5 mg) by mouth 2 times daily.    atorvastatin (LIPITOR) 10 MG tablet     Sig: Take 1 tablet (10 mg) by mouth daily.     Dispense:  90 tablet     Refill:  3    diltiazem ER (TIAZAC) 120 MG 24 hr ER beaded capsule     Sig: Take 1 capsule (120 mg) by mouth 2 times daily.     Dispense:  180 capsule     Refill:  3     Medications Discontinued  "During This Encounter   Medication Reason    tiotropium (SPIRIVA HANDIHALER) 18 MCG inhaled capsule Alternate therapy    apixaban ANTICOAGULANT (ELIQUIS ANTICOAGULANT) 5 MG tablet Reorder (No AVS)    atorvastatin (LIPITOR) 10 MG tablet Reorder (No AVS)    diltiazem ER (TIAZAC) 120 MG 24 hr ER beaded capsule Reorder (No AVS)       Today's clinic visit entailed:  Review of prior external note(s) from - CareEverywhere information from [facility] reviewed  Review of the result(s) of each unique test - EKG, echo, Labs,   I spent a total of 30 minutes on the day of the visit.   Time spent by me doing chart review, history and exam, documentation and further activities per the note  Provider  Link to [facility] Help Grid     The level of medical decision making during this visit was of moderate complexity.           Review of Systems:     Review of Systems:  Skin:        Eyes:       ENT:       Respiratory:  Positive for    Cardiovascular:    Positive for;dizziness  Gastroenterology:      Genitourinary:       Musculoskeletal:       Neurologic:       Psychiatric:       Heme/Lymph/Imm:       Endocrine:                 Physical Exam:     Vitals: /80   Pulse 78   Ht 1.613 m (5' 3.5\")   Wt 59.9 kg (132 lb)   LMP  (LMP Unknown)   SpO2 97%   BMI 23.02 kg/m    Constitutional: Well nourished and in no apparent distress.  Eyes: Pupils equal, round.   HEENT: Normocephalic, atraumatic.   Neck: Supple.   Respiratory: Breathing non-labored. Lungs clear to auscultation bilaterally.  Cardiovascular:  Regular rate and rhythm, normal S1 and S2. No murmur   Skin: Warm, dry.   Extremities: No edema.  Neurologic: No gross motor deficits. Alert, awake, and oriented to person, place and time.  Psychiatric: Affect appropriate.        CURRENT MEDICATIONS:  Current Outpatient Medications   Medication Sig Dispense Refill    apixaban ANTICOAGULANT (ELIQUIS ANTICOAGULANT) 2.5 MG tablet Take 1 tablet (2.5 mg) by mouth 2 times daily.      atorvastatin " (LIPITOR) 10 MG tablet Take 1 tablet (10 mg) by mouth daily. 90 tablet 3    calcium carbonate (OS-SHINE) 1500 (600 Ca) MG tablet Take 600 mg by mouth      cetirizine (ZYRTEC) 10 MG tablet Take 10 mg by mouth daily      diltiazem ER (TIAZAC) 120 MG 24 hr ER beaded capsule Take 1 capsule (120 mg) by mouth 2 times daily. 180 capsule 3    docusate sodium (DSS) 100 MG capsule Take 1 capsule by mouth daily      ipratropium (ATROVENT) 0.02 % neb solution Inhale 0.5 mg into the lungs      omeprazole (PRILOSEC) 20 MG DR capsule TAKE 1 CAPSULE BY MOUTH TWICE DAILY. TAKE 1 HOUR BEFORE MEAL.      TRELEGY ELLIPTA 100-62.5-25 MCG/ACT oral inhaler Inhale 2 puffs into the lungs daily      albuterol (PROAIR HFA/PROVENTIL HFA/VENTOLIN HFA) 108 (90 Base) MCG/ACT inhaler Inhale 2 puffs into the lungs every 4 hours as needed      fluticasone (ARNUITY ELLIPTA) 50 MCG/ACT inhaler Inhale 1 puff into the lungs daily (Patient not taking: Reported on 9/21/2023)      mometasone-formoterol (DULERA) 200-5 MCG/ACT inhaler Inhale 2 puffs into the lungs (Patient not taking: Reported on 9/21/2023)      montelukast (SINGULAIR) 10 MG tablet Take 1 tablet by mouth At Bedtime (Patient not taking: Reported on 9/23/2024)         ALLERGIES  Allergies   Allergen Reactions    Sulfa Antibiotics Unknown    Diazepam Other (See Comments)     Makes more anxious    Meperidine Nausea and Vomiting and Unknown    Morphine Nausea and Unknown    Penicillins Itching    Sulfamethoxazole-Trimethoprim Unknown    Zafirlukast          PAST MEDICAL HISTORY:  No past medical history on file.    PAST SURGICAL HISTORY:  No past surgical history on file.    FAMILY HISTORY:  Family History   Problem Relation Age of Onset    Heart Defect Mother     Myocardial Infarction Father        SOCIAL HISTORY:  Social History     Socioeconomic History    Marital status:    Tobacco Use    Smoking status: Never    Smokeless tobacco: Never

## 2024-10-23 ENCOUNTER — ANCILLARY PROCEDURE (OUTPATIENT)
Dept: CT IMAGING | Facility: CLINIC | Age: 79
End: 2024-10-23
Attending: FAMILY MEDICINE
Payer: COMMERCIAL

## 2024-10-23 DIAGNOSIS — R91.1 PULMONARY NODULE: ICD-10-CM

## 2024-10-23 PROCEDURE — 71250 CT THORAX DX C-: CPT

## 2024-10-29 ENCOUNTER — ANCILLARY PROCEDURE (OUTPATIENT)
Dept: ULTRASOUND IMAGING | Facility: CLINIC | Age: 79
End: 2024-10-29
Attending: FAMILY MEDICINE
Payer: COMMERCIAL

## 2024-10-29 DIAGNOSIS — R22.2 LUMP OF SKIN OF BACK: ICD-10-CM

## 2024-10-29 PROCEDURE — 76705 ECHO EXAM OF ABDOMEN: CPT

## 2024-12-15 ENCOUNTER — HEALTH MAINTENANCE LETTER (OUTPATIENT)
Age: 79
End: 2024-12-15

## 2024-12-30 ENCOUNTER — HOSPITAL ENCOUNTER (OUTPATIENT)
Facility: CLINIC | Age: 79
Setting detail: OBSERVATION
Discharge: HOME | End: 2024-12-31
Attending: SOCIAL WORKER | Admitting: STUDENT IN AN ORGANIZED HEALTH CARE EDUCATION/TRAINING PROGRAM
Payer: COMMERCIAL

## 2024-12-30 ENCOUNTER — APPOINTMENT (OUTPATIENT)
Dept: CT IMAGING | Facility: CLINIC | Age: 79
End: 2024-12-30
Attending: SOCIAL WORKER
Payer: COMMERCIAL

## 2024-12-30 ENCOUNTER — APPOINTMENT (OUTPATIENT)
Dept: MRI IMAGING | Facility: CLINIC | Age: 79
End: 2024-12-30
Attending: SOCIAL WORKER
Payer: COMMERCIAL

## 2024-12-30 DIAGNOSIS — R20.2 PARESTHESIA: ICD-10-CM

## 2024-12-30 DIAGNOSIS — I48.0 PAROXYSMAL ATRIAL FIBRILLATION (H): ICD-10-CM

## 2024-12-30 DIAGNOSIS — I63.9 CEREBROVASCULAR ACCIDENT (CVA), UNSPECIFIED MECHANISM (H): Primary | ICD-10-CM

## 2024-12-30 DIAGNOSIS — R90.89 ABNORMAL FINDING ON MRI OF BRAIN: ICD-10-CM

## 2024-12-30 DIAGNOSIS — R20.2 FACIAL PARESTHESIA: ICD-10-CM

## 2024-12-30 DIAGNOSIS — G45.9 TRANSIENT ISCHEMIC ATTACK (TIA): ICD-10-CM

## 2024-12-30 LAB
ANION GAP SERPL CALCULATED.3IONS-SCNC: 8 MMOL/L (ref 7–15)
ATRIAL RATE - MUSE: 78 BPM
BASOPHILS # BLD AUTO: 0 10E3/UL (ref 0–0.2)
BASOPHILS NFR BLD AUTO: 1 %
BUN SERPL-MCNC: 19.3 MG/DL (ref 8–23)
CALCIUM SERPL-MCNC: 9 MG/DL (ref 8.8–10.4)
CHLORIDE SERPL-SCNC: 102 MMOL/L (ref 98–107)
CREAT SERPL-MCNC: 0.73 MG/DL (ref 0.51–0.95)
DIASTOLIC BLOOD PRESSURE - MUSE: NORMAL MMHG
EGFRCR SERPLBLD CKD-EPI 2021: 83 ML/MIN/1.73M2
EOSINOPHIL # BLD AUTO: 0.2 10E3/UL (ref 0–0.7)
EOSINOPHIL NFR BLD AUTO: 2 %
ERYTHROCYTE [DISTWIDTH] IN BLOOD BY AUTOMATED COUNT: 14.1 % (ref 10–15)
GLUCOSE SERPL-MCNC: 97 MG/DL (ref 70–99)
HCO3 SERPL-SCNC: 25 MMOL/L (ref 22–29)
HCT VFR BLD AUTO: 35.8 % (ref 35–47)
HGB BLD-MCNC: 12.2 G/DL (ref 11.7–15.7)
HOLD SPECIMEN: NORMAL
HOLD SPECIMEN: NORMAL
IMM GRANULOCYTES # BLD: 0 10E3/UL
IMM GRANULOCYTES NFR BLD: 1 %
INTERPRETATION ECG - MUSE: NORMAL
LYMPHOCYTES # BLD AUTO: 2.1 10E3/UL (ref 0.8–5.3)
LYMPHOCYTES NFR BLD AUTO: 24 %
MCH RBC QN AUTO: 29.3 PG (ref 26.5–33)
MCHC RBC AUTO-ENTMCNC: 34.1 G/DL (ref 31.5–36.5)
MCV RBC AUTO: 86 FL (ref 78–100)
MONOCYTES # BLD AUTO: 0.9 10E3/UL (ref 0–1.3)
MONOCYTES NFR BLD AUTO: 11 %
NEUTROPHILS # BLD AUTO: 5.3 10E3/UL (ref 1.6–8.3)
NEUTROPHILS NFR BLD AUTO: 63 %
NRBC # BLD AUTO: 0 10E3/UL
NRBC BLD AUTO-RTO: 0 /100
P AXIS - MUSE: 66 DEGREES
PLATELET # BLD AUTO: 374 10E3/UL (ref 150–450)
POTASSIUM SERPL-SCNC: 3.9 MMOL/L (ref 3.4–5.3)
PR INTERVAL - MUSE: 164 MS
QRS DURATION - MUSE: 94 MS
QT - MUSE: 386 MS
QTC - MUSE: 440 MS
R AXIS - MUSE: 0 DEGREES
RBC # BLD AUTO: 4.17 10E6/UL (ref 3.8–5.2)
SODIUM SERPL-SCNC: 135 MMOL/L (ref 135–145)
SYSTOLIC BLOOD PRESSURE - MUSE: NORMAL MMHG
T AXIS - MUSE: 47 DEGREES
VENTRICULAR RATE- MUSE: 78 BPM
WBC # BLD AUTO: 8.5 10E3/UL (ref 4–11)

## 2024-12-30 PROCEDURE — 83036 HEMOGLOBIN GLYCOSYLATED A1C: CPT | Performed by: STUDENT IN AN ORGANIZED HEALTH CARE EDUCATION/TRAINING PROGRAM

## 2024-12-30 PROCEDURE — 250N000009 HC RX 250: Performed by: SOCIAL WORKER

## 2024-12-30 PROCEDURE — 85025 COMPLETE CBC W/AUTO DIFF WBC: CPT | Performed by: SOCIAL WORKER

## 2024-12-30 PROCEDURE — 250N000013 HC RX MED GY IP 250 OP 250 PS 637: Performed by: SOCIAL WORKER

## 2024-12-30 PROCEDURE — 99291 CRITICAL CARE FIRST HOUR: CPT | Mod: 25

## 2024-12-30 PROCEDURE — 85014 HEMATOCRIT: CPT | Performed by: EMERGENCY MEDICINE

## 2024-12-30 PROCEDURE — 80061 LIPID PANEL: CPT | Performed by: STUDENT IN AN ORGANIZED HEALTH CARE EDUCATION/TRAINING PROGRAM

## 2024-12-30 PROCEDURE — 80048 BASIC METABOLIC PNL TOTAL CA: CPT | Performed by: SOCIAL WORKER

## 2024-12-30 PROCEDURE — 82310 ASSAY OF CALCIUM: CPT | Performed by: EMERGENCY MEDICINE

## 2024-12-30 PROCEDURE — 250N000011 HC RX IP 250 OP 636: Performed by: SOCIAL WORKER

## 2024-12-30 PROCEDURE — 36415 COLL VENOUS BLD VENIPUNCTURE: CPT | Performed by: EMERGENCY MEDICINE

## 2024-12-30 PROCEDURE — 255N000002 HC RX 255 OP 636: Performed by: SOCIAL WORKER

## 2024-12-30 PROCEDURE — 85025 COMPLETE CBC W/AUTO DIFF WBC: CPT | Performed by: EMERGENCY MEDICINE

## 2024-12-30 PROCEDURE — 70553 MRI BRAIN STEM W/O & W/DYE: CPT

## 2024-12-30 PROCEDURE — A9585 GADOBUTROL INJECTION: HCPCS | Performed by: SOCIAL WORKER

## 2024-12-30 PROCEDURE — 70450 CT HEAD/BRAIN W/O DYE: CPT

## 2024-12-30 PROCEDURE — 80048 BASIC METABOLIC PNL TOTAL CA: CPT | Performed by: EMERGENCY MEDICINE

## 2024-12-30 PROCEDURE — 93005 ELECTROCARDIOGRAM TRACING: CPT

## 2024-12-30 PROCEDURE — 99292 CRITICAL CARE ADDL 30 MIN: CPT

## 2024-12-30 RX ORDER — IOPAMIDOL 755 MG/ML
67 INJECTION, SOLUTION INTRAVASCULAR ONCE
Status: COMPLETED | OUTPATIENT
Start: 2024-12-30 | End: 2024-12-30

## 2024-12-30 RX ORDER — GADOBUTROL 604.72 MG/ML
5 INJECTION INTRAVENOUS ONCE
Status: COMPLETED | OUTPATIENT
Start: 2024-12-30 | End: 2024-12-30

## 2024-12-30 RX ORDER — ACETAMINOPHEN 500 MG
1000 TABLET ORAL ONCE
Status: COMPLETED | OUTPATIENT
Start: 2024-12-30 | End: 2024-12-30

## 2024-12-30 RX ADMIN — ACETAMINOPHEN 1000 MG: 500 TABLET ORAL at 23:02

## 2024-12-30 RX ADMIN — GADOBUTROL 5 ML: 604.72 INJECTION INTRAVENOUS at 22:13

## 2024-12-30 ASSESSMENT — ACTIVITIES OF DAILY LIVING (ADL)
ADLS_ACUITY_SCORE: 41

## 2024-12-30 NOTE — ED PROVIDER NOTES
"  Emergency Department Note      History of Present Illness     Chief Complaint   Numbness      HPI   Margareth Hogue is a 79 year old female, anticoagulated on Eliquis, with a history of TIA, atrial fibrillation, and hyperlipidemia who presents to the ED for numbness of right thumb and fingers as well as right lip and chin area. She reports that after she had these symptoms, she developed a headache. Lip numbness has now resolved however she is still feeling thumb numbness.  No vision changes, difficulty swallowing, difficulty speaking, weakness. Patient denies any falls or head trauma.  Patient has not followed up with neurology after her visit in July.    Independent Historian   None    Review of External Notes   I reviewed ER visit from July of 2024. Seen for headache, had paresthesias in fingers, had CT/CTA which showed chronic infarct. Has history of TIA.    Past Medical History     Medical History and Problem List   Osteoarthritis  Hyponatremia  Allergic conjunctivitis of both eyes  Bilateral sensorineural hearing loss  Chronic insomnia  GERD  Adenomatous polyp of colon  Renal cell carcinoma  Hyperlipidemia  IBS  Asthma  Paroxysmal atrial fibrillation  Sarcoidosis, lung  Seasonal allergies  TIA    Medications   Eliquis  Lipitor  Tiazac  Albuterol  Zyrtec  Atrovent  Prilosec  Trelegy ellipta     Surgical History   Nephrectomy, unilateral (5/25/2016)    Physical Exam     Patient Vitals for the past 24 hrs:   BP Temp Temp src Pulse Resp SpO2 Height Weight   12/30/24 1417 (!) 143/82 98  F (36.7  C) Temporal 82 16 97 % 1.613 m (5' 3.5\") 59 kg (130 lb)     Physical Exam  General: Overall stable and nontoxic appearing  HEENT: Conjunctivae clear.  Pupils equal round and reactive.  Neuro: Alert, oriented, no confusion.  Speech is fluent.  No bidirectional, upward or downward or rotational nystagmus.   No marilou or quadrant field cut.  Smile symmetric and tongue midline  Equal brow raise.  Sensation intact and equal over " the face bilaterally.    strength equal and full bilaterally. Flexion/extension of the upper extremities is grossly intact and equal bilaterally.  Dorsiflexion/plantarflexion full and equal bilaterally.  Flexion/extension at the knee and hip is grossly intact and equal bilaterally.  Sensation diminished over the right thumb and right wrist area, otherwise sensation is intact and equal throughout    No pronator drift present.   Finger-to-nose without dysmetria.  Gait intact.  CV: Regular rate and rhythm, radial and DP pulses equal  Respiratory: No signs of respiratory distress, lungs clear to auscultation bilaterally   Abdomen: Soft, nontender nondistended without rigidity or rebound  MSK: No lower extremity swelling or tenderness     Diagnostics     Lab Results   Labs Ordered and Resulted from Time of ED Arrival to Time of ED Departure   BASIC METABOLIC PANEL - Normal       Result Value    Sodium 135      Potassium 3.9      Chloride 102      Carbon Dioxide (CO2) 25      Anion Gap 8      Urea Nitrogen 19.3      Creatinine 0.73      GFR Estimate 83      Calcium 9.0      Glucose 97     CBC WITH PLATELETS AND DIFFERENTIAL    WBC Count 8.5      RBC Count 4.17      Hemoglobin 12.2      Hematocrit 35.8      MCV 86      MCH 29.3      MCHC 34.1      RDW 14.1      Platelet Count 374      % Neutrophils 63      % Lymphocytes 24      % Monocytes 11      % Eosinophils 2      % Basophils 1      % Immature Granulocytes 1      NRBCs per 100 WBC 0      Absolute Neutrophils 5.3      Absolute Lymphocytes 2.1      Absolute Monocytes 0.9      Absolute Eosinophils 0.2      Absolute Basophils 0.0      Absolute Immature Granulocytes 0.0      Absolute NRBCs 0.0         Imaging   MR Brain w/o & w Contrast   Final Result   IMPRESSION:   1.  No acute intracranial abnormality.      2.  Age-related and chronic ischemic changes.      3.  Small focus of presumed chronic hemosiderin in the right cerebellar vermis without mass effect.      4.   Marrow signal is diffusely T1 hypointense which can be seen in both benign and malignant marrow replacement processes.      5.  The postcontrast images/sequences are nondiagnostic.      Head CT w/o contrast    (Results Pending)   CTA Head Neck with Contrast    (Results Pending)       EKG   EKG 1427  Sinus rhythm with out any signs of acute ischemia  Rate 78  QRS 94 QTc 440    Independent Interpretation   CT Head: No intracranial hemorrhage or midline shift.    ED Course      Medications Administered   Medications   Saline (100 mLs As instructed $Given 12/30/24 2056)   iopamidol (ISOVUE-370) solution 67 mL (67 mLs Intravenous $Given 12/30/24 2055)   gadobutrol (GADAVIST) injection 5 mL (5 mLs Intravenous $Given 12/30/24 2213)   acetaminophen (TYLENOL) tablet 1,000 mg (1,000 mg Oral $Given 12/30/24 2302)       Procedures   Procedures     Discussion of Management   Admitting Hospitalist, Dr. Pretty  Neurology, Dr. Barry    ED Course   ED Course as of 12/31/24 0149   Mon Dec 30, 2024   1658 I obtained history and examined the patient as noted above.     Tue Dec 31, 2024   0017 Spoke with patient       Additional Documentation  None    Medical Decision Making / Diagnosis     CMS Diagnoses: None    MIPS       None    MDM   Margareth Hogue is a 79 year old female with a history of previous TIA, anticoagulated on Eliquis, who presents to the emergency department with chief complaint of episode of sudden onset right upper extremity numbness and right lip numbness.  On examination she is stable and nontoxic.  Her neurologic exam is without focal deficit with the exception of persistent right numbness over the thumb.  Laboratory workup is unremarkable.  Unfortunately, patient was unable to have CTA completed as IV infiltrated twice and as she was initially evaluated as part of the fast track flow process, she was primarily waiting in the lobby and there was delay in having IV placed.  MRI was obtained which showed  incidental findings that I discussed with patient and her .  Discussed with stroke neurology who recommend admission to the hospital for TIA workup.  Initially, patient was planning to go home despite this recommendation however she ultimately changed her mind.  Discussed with Dr. Pretty of hospitalist service who kindly accepted for admission for TIA workup.    Disposition   The patient was admitted to the hospital.     Diagnosis     ICD-10-CM    1. Paresthesia  R20.2       2. Facial paresthesia  R20.2 CANCELED: Adult Neurology  Referral      3. Transient ischemic attack (TIA)  G45.9 CANCELED: Adult Neurology  Referral      4. Abnormal finding on MRI of brain  R90.89            Discharge Medications   New Prescriptions    No medications on file         Scribe Disclosure:  I, Donya Chaney, am serving as a scribe at 6:41 PM on 12/30/2024 to document services personally performed by Erica Ordonez MD based on my observations and the provider's statements to me.        Erica Ordonez MD  12/31/24 0151

## 2024-12-30 NOTE — ED TRIAGE NOTES
Patient complains of numbness in right hand for 45 minutes. States Complains of numbness to right side of her lips.    Patient complains of a headache. Denies vision changes or nausea.

## 2024-12-31 ENCOUNTER — APPOINTMENT (OUTPATIENT)
Dept: CARDIOLOGY | Facility: CLINIC | Age: 79
End: 2024-12-31
Attending: PHYSICIAN ASSISTANT
Payer: COMMERCIAL

## 2024-12-31 ENCOUNTER — APPOINTMENT (OUTPATIENT)
Dept: MRI IMAGING | Facility: CLINIC | Age: 79
End: 2024-12-31
Attending: SOCIAL WORKER
Payer: COMMERCIAL

## 2024-12-31 VITALS
RESPIRATION RATE: 16 BRPM | DIASTOLIC BLOOD PRESSURE: 93 MMHG | HEIGHT: 64 IN | WEIGHT: 130 LBS | OXYGEN SATURATION: 94 % | SYSTOLIC BLOOD PRESSURE: 140 MMHG | HEART RATE: 74 BPM | TEMPERATURE: 96.8 F | BODY MASS INDEX: 22.2 KG/M2

## 2024-12-31 PROBLEM — R20.2 FACIAL PARESTHESIA: Status: ACTIVE | Noted: 2024-12-31

## 2024-12-31 PROBLEM — R20.2 PARESTHESIA: Status: ACTIVE | Noted: 2024-12-31

## 2024-12-31 PROBLEM — R90.89 ABNORMAL FINDING ON MRI OF BRAIN: Status: ACTIVE | Noted: 2024-12-31

## 2024-12-31 LAB
ALBUMIN SERPL BCG-MCNC: 3.6 G/DL (ref 3.5–5.2)
ALP SERPL-CCNC: 82 U/L (ref 40–150)
ALT SERPL W P-5'-P-CCNC: 19 U/L (ref 0–50)
ANION GAP SERPL CALCULATED.3IONS-SCNC: 10 MMOL/L (ref 7–15)
AST SERPL W P-5'-P-CCNC: 20 U/L (ref 0–45)
BILIRUB SERPL-MCNC: 0.3 MG/DL
BUN SERPL-MCNC: 15.4 MG/DL (ref 8–23)
CALCIUM SERPL-MCNC: 8.8 MG/DL (ref 8.8–10.4)
CHLORIDE SERPL-SCNC: 101 MMOL/L (ref 98–107)
CHOLEST SERPL-MCNC: 209 MG/DL
CREAT SERPL-MCNC: 0.61 MG/DL (ref 0.51–0.95)
EGFRCR SERPLBLD CKD-EPI 2021: 90 ML/MIN/1.73M2
ERYTHROCYTE [DISTWIDTH] IN BLOOD BY AUTOMATED COUNT: 13.9 % (ref 10–15)
EST. AVERAGE GLUCOSE BLD GHB EST-MCNC: 114 MG/DL
GLUCOSE SERPL-MCNC: 126 MG/DL (ref 70–99)
HBA1C MFR BLD: 5.6 %
HCO3 SERPL-SCNC: 24 MMOL/L (ref 22–29)
HCT VFR BLD AUTO: 36 % (ref 35–47)
HDLC SERPL-MCNC: 82 MG/DL
HGB BLD-MCNC: 12.2 G/DL (ref 11.7–15.7)
LDLC SERPL CALC-MCNC: 111 MG/DL
LVEF ECHO: NORMAL
MCH RBC QN AUTO: 29.1 PG (ref 26.5–33)
MCHC RBC AUTO-ENTMCNC: 33.9 G/DL (ref 31.5–36.5)
MCV RBC AUTO: 86 FL (ref 78–100)
NONHDLC SERPL-MCNC: 127 MG/DL
PLATELET # BLD AUTO: 340 10E3/UL (ref 150–450)
POTASSIUM SERPL-SCNC: 4.1 MMOL/L (ref 3.4–5.3)
PROT SERPL-MCNC: 5.9 G/DL (ref 6.4–8.3)
RBC # BLD AUTO: 4.19 10E6/UL (ref 3.8–5.2)
SODIUM SERPL-SCNC: 135 MMOL/L (ref 135–145)
TRIGL SERPL-MCNC: 82 MG/DL
WBC # BLD AUTO: 8 10E3/UL (ref 4–11)

## 2024-12-31 PROCEDURE — 93306 TTE W/DOPPLER COMPLETE: CPT

## 2024-12-31 PROCEDURE — 999N000111 HC STATISTIC OT IP EVAL DEFER

## 2024-12-31 PROCEDURE — 99418 PROLNG IP/OBS E/M EA 15 MIN: CPT | Performed by: STUDENT IN AN ORGANIZED HEALTH CARE EDUCATION/TRAINING PROGRAM

## 2024-12-31 PROCEDURE — 99418 PROLNG IP/OBS E/M EA 15 MIN: CPT | Mod: FS | Performed by: STUDENT IN AN ORGANIZED HEALTH CARE EDUCATION/TRAINING PROGRAM

## 2024-12-31 PROCEDURE — 99236 HOSP IP/OBS SAME DATE HI 85: CPT | Performed by: STUDENT IN AN ORGANIZED HEALTH CARE EDUCATION/TRAINING PROGRAM

## 2024-12-31 PROCEDURE — G0378 HOSPITAL OBSERVATION PER HR: HCPCS

## 2024-12-31 PROCEDURE — 84132 ASSAY OF SERUM POTASSIUM: CPT | Performed by: STUDENT IN AN ORGANIZED HEALTH CARE EDUCATION/TRAINING PROGRAM

## 2024-12-31 PROCEDURE — 99207 PR APP CREDIT; MD BILLING SHARED VISIT: CPT | Performed by: HOSPITALIST

## 2024-12-31 PROCEDURE — 93306 TTE W/DOPPLER COMPLETE: CPT | Mod: 26 | Performed by: INTERNAL MEDICINE

## 2024-12-31 PROCEDURE — 85018 HEMOGLOBIN: CPT | Performed by: STUDENT IN AN ORGANIZED HEALTH CARE EDUCATION/TRAINING PROGRAM

## 2024-12-31 PROCEDURE — 70547 MR ANGIOGRAPHY NECK W/O DYE: CPT

## 2024-12-31 PROCEDURE — 82947 ASSAY GLUCOSE BLOOD QUANT: CPT | Performed by: STUDENT IN AN ORGANIZED HEALTH CARE EDUCATION/TRAINING PROGRAM

## 2024-12-31 PROCEDURE — 99223 1ST HOSP IP/OBS HIGH 75: CPT | Mod: FS | Performed by: STUDENT IN AN ORGANIZED HEALTH CARE EDUCATION/TRAINING PROGRAM

## 2024-12-31 PROCEDURE — 70544 MR ANGIOGRAPHY HEAD W/O DYE: CPT

## 2024-12-31 PROCEDURE — 250N000013 HC RX MED GY IP 250 OP 250 PS 637: Performed by: STUDENT IN AN ORGANIZED HEALTH CARE EDUCATION/TRAINING PROGRAM

## 2024-12-31 PROCEDURE — 36415 COLL VENOUS BLD VENIPUNCTURE: CPT | Performed by: STUDENT IN AN ORGANIZED HEALTH CARE EDUCATION/TRAINING PROGRAM

## 2024-12-31 RX ORDER — PROCHLORPERAZINE MALEATE 5 MG/1
5 TABLET ORAL EVERY 6 HOURS PRN
Status: DISCONTINUED | OUTPATIENT
Start: 2024-12-31 | End: 2024-12-31 | Stop reason: HOSPADM

## 2024-12-31 RX ORDER — ONDANSETRON 2 MG/ML
4 INJECTION INTRAMUSCULAR; INTRAVENOUS EVERY 6 HOURS PRN
Status: DISCONTINUED | OUTPATIENT
Start: 2024-12-31 | End: 2024-12-31 | Stop reason: HOSPADM

## 2024-12-31 RX ORDER — FLUTICASONE PROPIONATE 50 MCG
1 SPRAY, SUSPENSION (ML) NASAL DAILY PRN
Status: DISCONTINUED | OUTPATIENT
Start: 2024-12-31 | End: 2024-12-31 | Stop reason: HOSPADM

## 2024-12-31 RX ORDER — POLYETHYLENE GLYCOL 3350 17 G/17G
1 POWDER, FOR SOLUTION ORAL DAILY PRN
COMMUNITY

## 2024-12-31 RX ORDER — IPRATROPIUM BROMIDE AND ALBUTEROL SULFATE 2.5; .5 MG/3ML; MG/3ML
3 SOLUTION RESPIRATORY (INHALATION) EVERY 4 HOURS PRN
Status: DISCONTINUED | OUTPATIENT
Start: 2024-12-31 | End: 2024-12-31 | Stop reason: HOSPADM

## 2024-12-31 RX ORDER — AMOXICILLIN 250 MG
1 CAPSULE ORAL 2 TIMES DAILY PRN
Status: DISCONTINUED | OUTPATIENT
Start: 2024-12-31 | End: 2024-12-31 | Stop reason: HOSPADM

## 2024-12-31 RX ORDER — ACETAMINOPHEN 325 MG/1
650 TABLET ORAL EVERY 6 HOURS
Status: DISCONTINUED | OUTPATIENT
Start: 2024-12-31 | End: 2024-12-31 | Stop reason: HOSPADM

## 2024-12-31 RX ORDER — ACETAMINOPHEN 650 MG/1
650 SUPPOSITORY RECTAL EVERY 4 HOURS PRN
Status: DISCONTINUED | OUTPATIENT
Start: 2024-12-31 | End: 2024-12-31 | Stop reason: HOSPADM

## 2024-12-31 RX ORDER — BUDESONIDE 0.5 MG/2ML
0.5 INHALANT ORAL DAILY PRN
COMMUNITY

## 2024-12-31 RX ORDER — HYDRALAZINE HYDROCHLORIDE 20 MG/ML
10 INJECTION INTRAMUSCULAR; INTRAVENOUS EVERY 4 HOURS PRN
Status: DISCONTINUED | OUTPATIENT
Start: 2024-12-31 | End: 2024-12-31 | Stop reason: HOSPADM

## 2024-12-31 RX ORDER — FLUTICASONE PROPIONATE 50 MCG
1 SPRAY, SUSPENSION (ML) NASAL DAILY PRN
COMMUNITY

## 2024-12-31 RX ORDER — DILTIAZEM HYDROCHLORIDE 120 MG/1
120 CAPSULE, COATED, EXTENDED RELEASE ORAL 2 TIMES DAILY
Status: DISCONTINUED | OUTPATIENT
Start: 2024-12-31 | End: 2024-12-31 | Stop reason: HOSPADM

## 2024-12-31 RX ORDER — AMOXICILLIN 250 MG
2 CAPSULE ORAL 2 TIMES DAILY PRN
Status: DISCONTINUED | OUTPATIENT
Start: 2024-12-31 | End: 2024-12-31 | Stop reason: HOSPADM

## 2024-12-31 RX ORDER — ALBUTEROL SULFATE 90 UG/1
2 INHALANT RESPIRATORY (INHALATION) EVERY 4 HOURS PRN
Status: DISCONTINUED | OUTPATIENT
Start: 2024-12-31 | End: 2024-12-31 | Stop reason: HOSPADM

## 2024-12-31 RX ORDER — DOCUSATE SODIUM 100 MG/1
100 CAPSULE, LIQUID FILLED ORAL EVERY EVENING
Status: DISCONTINUED | OUTPATIENT
Start: 2024-12-31 | End: 2024-12-31 | Stop reason: HOSPADM

## 2024-12-31 RX ORDER — HYDRALAZINE HYDROCHLORIDE 10 MG/1
10 TABLET, FILM COATED ORAL EVERY 4 HOURS PRN
Status: DISCONTINUED | OUTPATIENT
Start: 2024-12-31 | End: 2024-12-31 | Stop reason: HOSPADM

## 2024-12-31 RX ORDER — ONDANSETRON 4 MG/1
4 TABLET, ORALLY DISINTEGRATING ORAL EVERY 6 HOURS PRN
Status: DISCONTINUED | OUTPATIENT
Start: 2024-12-31 | End: 2024-12-31 | Stop reason: HOSPADM

## 2024-12-31 RX ORDER — ACETAMINOPHEN 325 MG/1
650 TABLET ORAL EVERY 4 HOURS PRN
Status: DISCONTINUED | OUTPATIENT
Start: 2024-12-31 | End: 2024-12-31 | Stop reason: HOSPADM

## 2024-12-31 RX ORDER — FLUTICASONE FUROATE AND VILANTEROL 100; 25 UG/1; UG/1
1 POWDER RESPIRATORY (INHALATION) DAILY
Status: DISCONTINUED | OUTPATIENT
Start: 2024-12-31 | End: 2024-12-31 | Stop reason: HOSPADM

## 2024-12-31 RX ORDER — CHLORAL HYDRATE 500 MG
1 CAPSULE ORAL 2 TIMES DAILY
COMMUNITY

## 2024-12-31 RX ORDER — CETIRIZINE HYDROCHLORIDE 10 MG/1
10 TABLET ORAL EVERY EVENING
Status: DISCONTINUED | OUTPATIENT
Start: 2024-12-31 | End: 2024-12-31 | Stop reason: HOSPADM

## 2024-12-31 RX ADMIN — ACETAMINOPHEN 650 MG: 325 TABLET, FILM COATED ORAL at 03:16

## 2024-12-31 RX ADMIN — ACETAMINOPHEN 650 MG: 325 TABLET, FILM COATED ORAL at 07:54

## 2024-12-31 RX ADMIN — APIXABAN 2.5 MG: 2.5 TABLET, FILM COATED ORAL at 03:16

## 2024-12-31 RX ADMIN — ACETAMINOPHEN 650 MG: 325 TABLET, FILM COATED ORAL at 12:54

## 2024-12-31 RX ADMIN — APIXABAN 2.5 MG: 2.5 TABLET, FILM COATED ORAL at 07:55

## 2024-12-31 ASSESSMENT — ACTIVITIES OF DAILY LIVING (ADL)
ADLS_ACUITY_SCORE: 21
ADLS_ACUITY_SCORE: 28
ADLS_ACUITY_SCORE: 21
ADLS_ACUITY_SCORE: 22
ADLS_ACUITY_SCORE: 41
ADLS_ACUITY_SCORE: 21
ADLS_ACUITY_SCORE: 28
ADLS_ACUITY_SCORE: 21
ADLS_ACUITY_SCORE: 22
ADLS_ACUITY_SCORE: 28
ADLS_ACUITY_SCORE: 28
ADLS_ACUITY_SCORE: 41
ADLS_ACUITY_SCORE: 22
ADLS_ACUITY_SCORE: 41
ADLS_ACUITY_SCORE: 21
ADLS_ACUITY_SCORE: 28
ADLS_ACUITY_SCORE: 28

## 2024-12-31 NOTE — CONSULTS
"[facility]    Stroke Telephone Note    I was called by Erica Gamez on 12/30/24 regarding patient Margareth Hogue. The patient is a 79 year old female with hx of Afib on Apixiban presented with R hand numbness that started from thumb and spread to index and middle finger and then went up  to the wrist and it was associated with numbness on R side of her lip.     Vitals  BP: (!) 143/82   Pulse: 82   Resp: 16   Temp: 98  F (36.7  C)   Weight: 59 kg (130 lb)    Imaging Findings  MRI brain no acute infarcts  CT head neg  Vessel imaging unable to obtain due to IV infiltration and it has been reported to me by Ed provider that pt does not want another IV    Impression  Transient hand and lip numbness    Recommendations  Some of her symptoms of hand could be related to TIA however the associated lip numbness would localize it to brain. Given transient nature the possibility of TIA can not be excluded completely.  Admit for TIA evaluation  TTE  MRA head and neck without contrast ( if pt does not want another IV)  If IV access is established then CTA head and neck for vessel imaging  LDL HbA1c  Permissive HTN  Q4 hours neurochecks  Continue PTA apixiban. I was updated by the hospitalist team that pt has been taking apixiban 5 mg daily instead of 2.5 mg BID.      My recommendations are based on the information provided over the phone by Margareth Hogue's in-person providers. They are not intended to replace the clinical judgment of her in-person providers. I was not requested to personally see or examine the patient at this time.     Susana Barry MD  Vascular Neurology    To page me or covering stroke neurology team member, click here: AMCOM  Choose \"On Call\" tab at top, then select \"NEUROLOGY/ALL SITES\" from middle drop-down box, press Enter, then look for \"stroke\" or \"telestroke\" for your site.         "

## 2024-12-31 NOTE — PROGRESS NOTES
"[facility]    Medicine Progress Note - Hospitalist Service    Date of Admission:  12/30/2024    Assessment & Plan   79F hx TIA, Asthma, Afib, RCC presenting w/ transient lip and R thumb numbness     --Hx: Patient while speaking to her  had acute onset R complete humb numbness, numbness in the fingertips of the R 2-4 fingers radiating to the R ventral wrist, along w/ R inferior lip. Afterwards began having a HA. On presentation, reports that the lip numbness resolved, the finger tip numbness has improved, but the complete thumb numbness persists. HA progressed during MRI but improved w/ tylenol in the ER. Complaints of persistent HA at the top of her head and neck pain/stiffness though patient states she has hx of similar neck discomfort in the past. Compliant w/ all medications. At no point was vision compromised. Of note, patient has R wrist carpal tunnel repair. Reports using the computer with her R hand a lot.   --Vitals/Exam: BP (!) 172/105   Pulse 82   Temp 98  F (36.7  C) (Temporal)   Resp 18   Ht 1.613 m (5' 3.5\")   Wt 59 kg (130 lb)   LMP  (LMP Unknown)   SpO2 96%   BMI 22.67 kg/m   CN2-12 groslly intact with no lip numbness. On examination, the numbness is not objective/reproducible, but is subjective with patient stating that she can feel objects w/ her thumb, but once it is free floating, she can't feel the thumb.  Has LE edema. Bilateral ventral  wrist nontender swellings. No neck stiffness  --EKG: NSR, High lateral Q waves  --Labs: all wnl  --ER Course: Tylenol     Subjective R thumb paraesthesia, r/o TIA  Transient R inferior Lip paraesthesia  Possible chronic CVA based on CT 7/2024 R corona radiate  Hx TIA HLD  --MRI showed Small focus of presumed chronic hemosiderin in the right cerebellar vermis without mass effect. Marrow signal is diffusely T1 hypointensy  --Neurology consult  --OT Consult  --TTE awaited as well as Neuro update/plan  --A1c 5.6%, LDL " 111  --MRA without contrast (pt refusing contrast) - unremarkable  --q4 neurochecks - defer to neuro  --Permissive HTN upto 220  --Tylenol for HA/subjective Neck stiffness     Afib, hx PVI 2023  --resume PTA diltiazem   --Note, patient's Eliquis was recently cut down to 2.5mg q12 (from 5q12 due to turning 79, however, patient has been taking it now has 5mg once a day thinking its the same as 2.5mg q12 as she can't break her 5mg dose tablet into halves without the tablet disintegrating. Patient states she never received the smaller dose of 2.5mg tablet/pills.  --plan to prescribe 2.5mg q12 tablets on discharge  --Resumed Eliquis  --will need new cardiology follow-up as patient has been dropped by cardiologist.      LE edema  --To the patient, the edema is baseline  --Defer inpatient diuresis for now, though should have outpatient PCP follow-up     Chronic stable conditions, resume meds once confirmed  Asthma  Sarcoidosis  GERD  Constipation  RCC s/p R nephrectomy   CKD2       Observation Goals: -diagnostic tests and consults completed and resulted, -vital signs normal or at patient baseline, -returns to baseline functional status, -safe disposition plan has been identified, Nurse to notify provider when observation goals have been met and patient is ready for discharge.  Diet: Regular Diet Adult    DVT Prophylaxis: DOAC  Curry Catheter: Not present  Lines: None     Cardiac Monitoring: ACTIVE order. Indication: Stroke, acute (48 hours)  Code Status: Full Code      Clinically Significant Risk Factors Present on Admission                # Drug Induced Coagulation Defect: home medication list includes an anticoagulant medication                  # Asthma: noted on problem list        Social Drivers of Health            Disposition Plan     Medically Ready for Discharge: Anticipated Tomorrow             Juan Sullivan MD  Hospitalist Service  [facility]  Securely message with Vocera (more  info)  Text page via [facility] Paging/Directory   ______________________________________________________________________    Interval History   Care assumed today. Seen and examined - continues to report R thumb/hand numbness and lip as well. Headache improved since imaging tests earlier - she feels the imaging tests brought on headache. No vision changes. No f/c, sob, or other pain.    Physical Exam   Vital Signs: Temp: 96.8  F (36  C) Temp src: Axillary BP: 134/69 Pulse: 76   Resp: 16 SpO2: 96 % O2 Device: None (Room air)    Weight: 130 lbs 0 oz    Gen: NAD, pleasant  HEENT: EOMI, MMM  Resp: no focal crackles,  no wheezes, no increased work of resp  CV: S1S2 heard, reg rhythm, reg rate  Abdo: soft, nontender, nondistended, bowel sounds present  Ext: calves nontender, well perfused  Neuro: aa, conversing, moving ext, CN grossly intact, no facial asymmetry      Medical Decision Making       38 MINUTES SPENT BY ME on the date of service doing chart review, history, exam, documentation & further activities per the note.      Data     I have personally reviewed the following data over the past 24 hrs:    8.0  \   12.2   / 340     135 101 15.4 /  126 (H)   4.1 24 0.61 \     ALT: 19 AST: 20 AP: 82 TBILI: 0.3   ALB: 3.6 TOT PROTEIN: 5.9 (L) LIPASE: N/A     TSH: N/A T4: N/A A1C: N/A

## 2024-12-31 NOTE — PLAN OF CARE
Reason for Admission: Paresthesia, r/o TIA    Cognitive/Mentation: A/Ox 4  Neuros/CMS: Intact ex numbness on R thumb and radial wrist area  VS: VSS on RA.   Tele: NSR.  /GI: Continent. Gets up to the BR. Last BM PTA.   Pulmonary: LS clear.  Pain: denies.     Drains/Lines: No PIV  Skin: Bilateral upper arm swelling from contrast  Activity: Assist x 1 with SBA/ Up ad anjelica  Diet: Reg with thin liquids. Takes pills whole.     Discharge: Pending    Aggression Stoplight Tool: Green    End of shift summary: Fair sleep between cares.  Pending TTE

## 2024-12-31 NOTE — PROVIDER NOTIFICATION
RECEIVING UNIT ED HANDOFF REVIEW    ED Nurse Handoff Report was reviewed by: Tao Brito RN on December 31, 2024 at 2:20 AM       Initial (On Arrival)

## 2024-12-31 NOTE — DISCHARGE INSTRUCTIONS
You were seen emergency department for numbness of your lips that went away and numbness of your thumb and fingers.  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  I have placed referral for them and they should be giving you a call to schedule follow-up.  We did discuss that the recommendation for the neurology doctor would be to stay for observation, however you would not like to do so.    Please call your primary care doctor to schedule follow-up appointment.     Come back to the emergency department immediately if you have sudden severe headache, if you have return of numbness over your face, if you have spreading numbness over your hand or arm, if you have weakness anywhere that is new, if you have slurred speech, facial droop, difficulty swallowing, or any other concerning symptoms.      Your risk factors for stroke or TIA (transient ischemic attack):     Your Risk Factors Your Results Goals   [x] High blood pressure BP: (!) 140/93 (12/31/24 1537) Less than 120/80   [x] Cholesterol          Total 12/30/2024: 209 mg/dL   Less than 150    Triglycerides   12/30/2024: 82 mg/dL Less than 150    LDL 12/30/2024: 111 mg/dL    Less than 70    HDL 12/30/2024: 82 mg/dL         Greater than 40 (men)  Greater than 50 (women)   [] Diabetes                A1C 12/30/2024: 5.6 % Less than 5.7   [x] Atrial fibrillation Atrial fibrillation noted on cardiac monitoring Manage per physician orders   [] Smoking/tobacco use   Tobacco Use      Smoking status: Never      Smokeless tobacco: Never   Quit smoking and tobacco   [] Overweight Body mass index is 22.67 kg/m .  Less than 25     Other risk factors include: carotid (neck) artery disease, other heart diseases, prior stroke or TIA, poor diet, lack of exercise, and excessive alcohol  consumption.     [] Written stroke educational materials given to patient including:   - Learning about BE FAST: Stroke Warning Signs and Learning about Risk Factors for Stroke (Healthwise)        Know the warning signs and symptoms of stroke: BE FAST     B = Balance loss   E = Eyesight changes   F = Facial droop or numbness   A = Arm or leg weakness   S = Speech difficulty, slurred speech   T = Time to call 911 for help

## 2024-12-31 NOTE — CONSULTS
[facility]    Stroke Consult Note    Reason for Consult:  Possible Stroke    Chief Complaint: Numbness       HPI  Margareth Hogue is a 79 year old female with pertinent past medical history of prior TIA, history of right carpal tunnel repair, renal cell carcinoma atrial fibrillation on Eliquis 2.5 twice daily but was not taking as prescribed (see below).  On Lipitor 10 mg daily.    She presented to the emergency department 12/30/2024 due to headache followed by transient right hand numbness that started at her thumb and spread to her index and middle fingers then went up the wrist as well as numbness to the right side of her inferior lip.  MRI was negative for acute pathology.  Her IV infiltrated and declined replacement so MRA head/neck without contrast obtained and unremarkable.  Was recommended admission for TIA workup.    She reports that she was taking Eliquis 5 mg once daily every morning and has not been taking evening dose as when she tries to cut the pills herself they break into powder so she has been avoiding wasting medication.  Discussed that she is overdosing in the morning and underdosing in the evening which could lead to complications such as bleeding or clotting.     She denies any history of smoking, very rare social alcohol consumption.  Denies snoring or excessive daytime sleepiness.  Denies fevers, night sweats, or unintentional weight loss    Evaluation Summarized    MRI and/or Head CT MRI brain: Negative for acute pathology   Intracranial Vasculature MRA head without contrast (patient declined another IV as hers infiltrated): Unremarkable   Cervical Vasculature MRA neck without contrast: Unremarkable     Echocardiogram TTE: Pending   EKG/Telemetry Sinus rhythm   Septal infarct , age undetermined    Other Testing Not Applicable      LDL 12/30/2024: 111 mg/dL   A1C 12/30/2024: 5.6 %       Impression/Recommendations   # Persistent R inferior lip numbness,  "potentially MRI negative stroke  #Acute on chronic Right hand/wrist numbness   possibly 2/2 known carpal tunnel s/p prior repair versus MRI negative stroke given R facial involvement as well lower suspicion of migraine aura  # Atrial fibrillation with incorrect Eliquis dosing compliance  -Neuro checks and vitals every 4 hours  -Continue PTA Eliquis 2.5 mg twice daily (based on age/weight), counseled patient to request pharmacy assist with cutting tablets in half for her and ensuring compliance every 12 hours  -telemetry  -Follow-up with PCP or orthopedics to re-evaluate carpal tunnel  -Long-term BP goal <140/90, with tighter control if tolerated  -Await TTE to rule out LV thrombus  -blood glucose monitoring, long term A1c goal <7%  -PT/OT/SPT, Dysphagia screen  -Euthermia, euglycemia, eunatremia  -Stroke Education, reviewed [facility] stroke symptoms     #Elevated blood pressures without formal diagnosis of hypertension  -appreciate management per primary team  -Outpatient BP goal <140/90, with tighter control if tolerated  -Recommend home blood pressure monitoring twice daily in AM and PM, keep log and bring to medical visits    #Hyperlipidemia  -Recommend increasing PTA atorvastatin 10 mg to 20 mg daily (lower dose given age), can titrate outpatient if tolerating well to achieve goal  -Recommend LDL goal 40-70, <40 increases risk of intracranial hemorrhage  -Recommend Mediterranean diet       Discussed with vascular neurology attending, Dr. Greene      Patient Follow-up    - in the next 1 week(s) with PCP  - in 6-8 weeks with general neurology or stroke ANTONY ([phone]) (ordered)  - Follow-up with PCP or orthopedics to re-evaluate carpal tunnel    Thank you for this consult.  Will follow peripherally for results of TTE.  If no concerning findings then we will sign off.    Loulou Bright PA-C  Vascular Neurology    To page me or covering stroke neurology team member, click here: [facility]  Choose \"On Call\" tab at top, then " "select \"NEUROLOGY/ALL SITES\" from middle drop-down box, press Enter, then look for \"stroke\" or \"telestroke\" for your site.  _____________________________________________________    Clinically Significant Risk Factors Present on Admission                # Drug Induced Coagulation Defect: home medication list includes an anticoagulant medication                  # Asthma: noted on problem list           Past Medical History    No past medical history on file.  Medications   Home Meds  Prior to Admission medications    Medication Sig Start Date End Date Taking? Authorizing Provider   LORRI ELLIPTA 100-62.5-25 MCG/ACT oral inhaler Inhale 2 puffs into the lungs daily   Yes Reported, Patient   albuterol (PROAIR HFA/PROVENTIL HFA/VENTOLIN HFA) 108 (90 Base) MCG/ACT inhaler Inhale 2 puffs into the lungs every 4 hours as needed 11/16/22   Reported, Patient   apixaban ANTICOAGULANT (ELIQUIS ANTICOAGULANT) 2.5 MG tablet Take 1 tablet (2.5 mg) by mouth 2 times daily. 9/23/24   Addie Junior APRN CNP   atorvastatin (LIPITOR) 10 MG tablet Take 1 tablet (10 mg) by mouth daily. 9/23/24   Addie Junior APRN CNP   calcium carbonate (OS-SHINE) 1500 (600 Ca) MG tablet Take 600 mg by mouth    Reported, Patient   cetirizine (ZYRTEC) 10 MG tablet Take 10 mg by mouth daily    Reported, Patient   diltiazem ER (TIAZAC) 120 MG 24 hr ER beaded capsule Take 1 capsule (120 mg) by mouth 2 times daily. 9/23/24   Addie Junior APRN CNP   docusate sodium (DSS) 100 MG capsule Take 1 capsule by mouth daily 8/14/22   Reported, Patient   fluticasone (ARNUITY ELLIPTA) 50 MCG/ACT inhaler Inhale 1 puff into the lungs daily  Patient not taking: Reported on 9/21/2023    Reported, Patient   ipratropium (ATROVENT) 0.02 % neb solution Inhale 0.5 mg into the lungs    Reported, Patient   mometasone-formoterol (DULERA) 200-5 MCG/ACT inhaler Inhale 2 puffs into the lungs  Patient not taking: Reported on 9/21/2023 11/16/22 11/16/23  Reported, Patient   montelukast " (SINGULAIR) 10 MG tablet Take 1 tablet by mouth At Bedtime  Patient not taking: Reported on 9/23/2024 1/16/23   Reported, Patient   omeprazole (PRILOSEC) 20 MG DR capsule TAKE 1 CAPSULE BY MOUTH TWICE DAILY. TAKE 1 HOUR BEFORE MEAL. 8/27/22   Reported, Patient       Scheduled Meds  Current Facility-Administered Medications   Medication Dose Route Frequency Provider Last Rate Last Admin    acetaminophen (TYLENOL) tablet 650 mg  650 mg Oral Q6H Cheyanne Osman MD   650 mg at 12/31/24 0754    apixaban ANTICOAGULANT (ELIQUIS) tablet 2.5 mg  2.5 mg Oral BID Cheyanne Osman MD   2.5 mg at 12/31/24 0755       Infusion Meds  Current Facility-Administered Medications   Medication Dose Route Frequency Provider Last Rate Last Admin       Allergies   Allergies   Allergen Reactions    Sulfa Antibiotics Unknown    Diazepam Other (See Comments)     Makes more anxious    Meperidine Nausea and Vomiting and Unknown    Morphine Nausea and Unknown    Penicillins Itching    Sulfamethoxazole-Trimethoprim Unknown    Zafirlukast           PHYSICAL EXAMINATION   Temp:  [96.8  F (36  C)-98  F (36.7  C)] 96.8  F (36  C)  Pulse:  [73-82] 76  Resp:  [16-18] 16  BP: (134-172)/() 134/69  SpO2:  [96 %-97 %] 96 %    General Exam  General:  patient lying in bed without any acute distress    HEENT:  normocephalic/atraumatic  Pulmonary:  no respiratory distress    Neuro Exam  Mental Status:  alert, oriented x 3, follows commands, speech clear and fluent, naming and repetition normal  Cranial Nerves:  visual fields intact, PERRL, EOMI with normal smooth pursuit, facial movements symmetric, hearing not formally tested but intact to conversation, tongue protrusion midline, mild numbness to right inferior lip  Motor:  normal muscle tone and bulk, no abnormal movements, able to move all limbs spontaneously, strength 5/5 throughout upper and lower extremities, no pronator drift  Reflexes:  toes down-going  Sensory: Numbness to right hand and  "medial nerve distribution, reports resolution of numbness to right wrist, otherwise sensations intact to left upper extremity and bilateral lower extremitiesno extinction on double simultaneous stimulation   Coordination:  normal finger-to-nose and heel-to-shin bilaterally without dysmetria  Station/Gait:  deferred    Stroke Scales    NIHSS  1a. Level of Consciousness 0-->Alert, keenly responsive   1b. LOC Questions 0-->Answers both questions correctly   1c. LOC Commands 0-->Performs both tasks correctly   2.   Best Gaze 0-->Normal   3.   Visual 0-->No visual loss   4.   Facial Palsy 0-->Normal symmetrical movements   5a. Motor Arm, Left 0-->No drift, limb holds 90 (or 45) degrees for full 10 secs   5b. Motor Arm, Right 0-->No drift, limb holds 90 (or 45) degrees for full 10 secs   6a. Motor Leg, Left 0-->No drift, leg holds 30 degree position for full 5 secs   6b. Motor Leg, right 0-->No drift, leg holds 30 degree position for full 5 secs   7.   Limb Ataxia 0-->Absent   8.   Sensory (S) 1-->Mild-to-moderate sensory loss, patient feels pinprick is less sharp or is dull on the affected side, or there is a loss of superficial pain with pinprick, but patient is aware of being touched (Right inferior lip and right lateral fingers)   9.   Best Language 0-->No aphasia, normal   10. Dysarthria 0-->Normal   11. Extinction and Inattention  0-->No abnormality   Total 1 (12/31/24 1556)       Imaging  I personally reviewed all imaging; relevant findings per HPI.    Labs Data   CBC  Recent Labs   Lab 12/30/24  1427   WBC 8.5   RBC 4.17   HGB 12.2   HCT 35.8        Basic Metabolic Panel   Recent Labs   Lab 12/30/24  1427      POTASSIUM 3.9   CHLORIDE 102   CO2 25   BUN 19.3   CR 0.73   GLC 97   SHINE 9.0     Liver Panel  No results for input(s): \"PROTTOTAL\", \"ALBUMIN\", \"BILITOTAL\", \"ALKPHOS\", \"AST\", \"ALT\", \"BILIDIRECT\" in the last 168 hours.  INR    Recent Labs   Lab Test 09/13/24  1517   INR 1.0           Stroke " Consult Data Data   This was a non-emergent, non-telestroke consult.  I have personally spent a total of 70 minutes providing care today, time spent in reviewing medical records and devising the plan as recorded above.     *All or a portion of this note was generated using voice recognition software and may contain transcription errors.

## 2024-12-31 NOTE — PHARMACY-ADMISSION MEDICATION HISTORY
Pharmacist Admission Medication History    Admission medication history is complete. The information provided in this note is only as accurate as the sources available at the time of the update.    Information Source(s): Patient and CareEverywhere/SureScripts via in-person    Pertinent Information:   -Outpatient provider decreased apixaban from 5mg BID to 2.5mg BID but she had just refilled 5mg & wanted to use up that supply before fill new Rx for 2.5mg - she tried cutting 2.5mg tabs in half but unsuccessful so she is only taking 5mg once daily.   -She recently completed 5 day prednisone burst    Changes made to PTA medication list:  Added: Flonase, miralax, budesonide neb, premarin cream, fish oil, OTC dry eye drop  Deleted: Arnuity Ellipta, Dulera, not taking montelukast  Changed: omeprazole BID > daily prn    Allergies reviewed with patient and updates made in EHR: yes    Medication History Completed By: Kamala Paredes [facility] 12/31/2024 10:21 AM    PTA Med List   Medication Sig Note Last Dose/Taking    albuterol (PROAIR HFA/PROVENTIL HFA/VENTOLIN HFA) 108 (90 Base) MCG/ACT inhaler Inhale 2 puffs into the lungs every 4 hours as needed  Taking As Needed    apixaban ANTICOAGULANT (ELIQUIS) 5 MG tablet Take 5 mg by mouth daily. 12/31/2024: Using up supply 5mg before fill new Rx for 2.5mg - unsuccessful in cutting 5mg in half so only taking 5mg once daily 12/30/2024 Morning    atorvastatin (LIPITOR) 10 MG tablet Take 1 tablet (10 mg) by mouth daily.  12/30/2024 Morning    budesonide (PULMICORT) 0.5 MG/2ML neb solution Take 0.5 mg by nebulization daily as needed.  Taking As Needed    calcium carbonate (OS-SHINE) 1500 (600 Ca) MG tablet Take 600 mg by mouth daily.  12/30/2024 Morning    cetirizine (ZYRTEC) 10 MG tablet Take 10 mg by mouth every evening.  12/29/2024 Evening    conjugated estrogens (PREMARIN) 0.625 MG/GM vaginal cream Place vaginally daily as needed.  Taking As Needed    diltiazem ER (TIAZAC) 120 MG 24 hr  ER beaded capsule Take 1 capsule (120 mg) by mouth 2 times daily.  12/30/2024 Morning    docusate sodium (DSS) 100 MG capsule Take 1 capsule by mouth every evening.  12/29/2024 Evening    fish oil-omega-3 fatty acids 1000 MG capsule Take 1 g by mouth 2 times daily.  12/30/2024 Morning    fluticasone (FLONASE) 50 MCG/ACT nasal spray Spray 1 spray into both nostrils daily as needed for rhinitis or allergies.  Taking As Needed    ipratropium (ATROVENT) 0.02 % neb solution Inhale 0.5 mg into the lungs every 6 hours as needed for wheezing.  Taking As Needed    omeprazole (PRILOSEC) 20 MG DR capsule Take 20 mg by mouth daily as needed.  Taking As Needed    polyethylene glycol (MIRALAX) 17 g packet Take 1 packet by mouth daily as needed for constipation.  Taking As Needed    polyethylene glycol-propylene glycol (SYSTANE ULTRA) 0.4-0.3 % SOLN ophthalmic solution Place 1-2 drops into both eyes 4 times daily as needed for dry eyes.  Taking As Needed    TRELEGY ELLIPTA 100-62.5-25 MCG/ACT oral inhaler Inhale 2 puffs into the lungs daily  12/30/2024 Morning

## 2024-12-31 NOTE — H&P
"Assessment / Plan    79F hx TIA, Asthma, Afib, RCC presenting w/ transient lip and R thumb numbness    --Hx: Patient while speaking to her  had acute onset R complete humb numbness, numbness in the fingertips of the R 2-4 fingers radiating to the R ventral wrist, along w/ R inferior lip. Afterwards began having a HA. On presentation, reports that the lip numbness resolved, the finger tip numbness has improved, but the complete thumb numbness persists. HA progressed during MRI but improved w/ tylenol in the ER. Complaints of persistent HA at the top of her head and neck pain/stiffness though patient states she has hx of similar neck discomfort in the past. Compliant w/ all medications. At no point was vision compromised. Of note, patient has R wrist carpal tunnel repair. Reports using the computer with her R hand a lot.   --Vitals/Exam: BP (!) 172/105   Pulse 82   Temp 98  F (36.7  C) (Temporal)   Resp 18   Ht 1.613 m (5' 3.5\")   Wt 59 kg (130 lb)   LMP  (LMP Unknown)   SpO2 96%   BMI 22.67 kg/m   CN2-12 groslly intact with no lip numbness. On examination, the numbness is not objective/reproducible, but is subjective with patient stating that she can feel objects w/ her thumb, but once it is free floating, she can't feel the thumb.  Has LE edema. Bilateral ventral  wrist nontender swellings. No neck stiffness  --EKG: NSR, High lateral Q waves  --Labs: all wnl  --ER Course: Tylenol    Subjective R thumb paraesthesia, r/o TIA  Transient R inferior Lip paraesthesia  Possible chronic CVA based on CT 7/2024 R corona radiate  Hx TIA HLD  --MRI showed Small focus of presumed chronic hemosiderin in the right cerebellar vermis without mass effect. Marrow signal is diffusely T1 hypointensy  --Neurology consult  --OT Consult  --TTE, telemetry  --A1c, lipid  --MRA without contrast (pt refusing contrast)  --q4 neurochecks  --Permissive HTN upto 220  --Tylenol for HA/subjective Neck stiffness    Afib, hx PVI " 2023  --resume diltiazem once confirmed in AM  --Note, patient's Eliquis was recently cut down to 2.5mg q12 (from 5q12 due to turning 79, however, patient has been taking it now has 5mg once a day thinking its the same as 2.5mg q12 as she can't break her 5mg dose tablet into halves without the tablet disintegrating. Patient states she never received the smaller dose of 2.5mg tablet/pills.  --dayteam to prescribe 2.5mg q12 tablets on discharge  --Resumed Eliquis  --will need new cardiology follow-up as patient has been dropped by cardiologist.     LE edema  --To the patient, the edema is baseline  --Defer inpatient diuresis for now, though should have outpatient PCP follow-up    Chronic stable conditions, resume meds once confirmed  Asthma  Sarcoidosis  GERD  Constipation  RCC s/p R nephrectomy   CKD2    Supportive Care  --DVT ppx: SCDs  --Diet: General  --Pain Control: tylenol  --Upper GI ppx: zofran  --Lower GI ppx: senna  -- ppx: none  --Lines/Catheters: IV  --TTE/Dopplers: TTE  --Contact/Seizure/Fall/Delerium Precautions:fall  --PT/OT/Social/Wound/Palliative Consults: OT   --Code Status: Full    History of Present Illness  --Hx: Patient while speaking to her  had acute onset R complete humb numbness, numbness in the fingertips of the R 2-4 fingers radiating to the R ventral wrist, along w/ R inferior lip. Afterwards began having a HA. On presentation, reports that the lip numbness resolved, the finger tip numbness has improved, but the complete thumb numbness persists. HA progressed during MRI but improved w/ tylenol in the ER. Complaints of persistent HA at the top of her head and neck pain/stiffness though patient states she has hx of similar neck discomfort in the past. Compliant w/ all medications. At no point was vision compromised. Of note, patient has R wrist carpal tunnel repair. Reports using the computer with her R hand a lot.     ROS: 10 point ROS neg other than the symptoms noted above in the  "HPI.     Objective History  BP (!) 172/105   Pulse 82   Temp 98  F (36.7  C) (Temporal)   Resp 18   Ht 1.613 m (5' 3.5\")   Wt 59 kg (130 lb)   LMP  (LMP Unknown)   SpO2 96%   BMI 22.67 kg/m      Constitutional: Alert and oriented to person, place and time; no apparent distress.   HEENT: Normocephalic, no scleral icterus  Abdomen: soft, no distention/tenderness/guarding  Lungs: lungs clear to auscultation bilaterally  Heart: Regular s1s2, no evidence of murmurs  Neuro:No focal strength/sensation deficits, On examination, the numbness is not objective/reproducible, but is subjective with patient stating that she can feel objects w/ her thumb, but once it is free floating, she can't feel the thumb.   Skin/Extremities:  Bilateral ventral  wrist nontender swellings, LE edema  Psychiatric: appropriate affect, insight and judgment  Back: No midline tenderness, no CVA tenderness    I have personally spent 79 minutes total time today in preparing to see the patient (eg, review of tests), obtaining and/or reviewing separately obtained history, performing a medically appropriate examination and/or evaluation, counseling and educating the patient/family/caregiver, ordering medications, tests, or procedures, referring and communicating with other health care professionals, documenting clinical information in the electronic or other health record, independently interpreting results and communicating results to the patient/ family/caregiver and care coordination.      "

## 2024-12-31 NOTE — PLAN OF CARE
OT: Order received, chart reviewed and discussed with care team and patient. OT not indicated due to patient at baseline mobility and up IND in room. Residual R thumb numbness. Discussed possibility of OP OT for follow-up if needed. Pt is agreeable. Anticipate discharge home when medically ready. Defer discharge recommendations to care team. Will complete orders.

## 2024-12-31 NOTE — ED NOTES
[facility]  ED Nurse Handoff Report    ED Chief complaint: Numbness      ED Diagnosis:   Final diagnoses:   Paresthesia   Facial paresthesia   Transient ischemic attack (TIA)   Abnormal finding on MRI of brain       Code Status: TBA by admitting provider    Allergies:   Allergies   Allergen Reactions    Sulfa Antibiotics Unknown    Diazepam Other (See Comments)     Makes more anxious    Meperidine Nausea and Vomiting and Unknown    Morphine Nausea and Unknown    Penicillins Itching    Sulfamethoxazole-Trimethoprim Unknown    Zafirlukast        Patient Story: Pt arrives with  after experiencing acute onset R hand numbness, numbness in the fingertips, radiating into her wrist. Headache onset in ED, worsening during her stay.   Focused Assessment:  Aox4. Vision fields unaffected.     Treatments and/or interventions provided: Multiple PIVs inserted, which all infiltrated with contrast administration. Pt now refusing additional PIVs. Labs; CT; MR; acetaminophen; EKG.    Labs Ordered and Resulted from Time of ED Arrival to Time of ED Departure   BASIC METABOLIC PANEL - Normal       Result Value    Sodium 135      Potassium 3.9      Chloride 102      Carbon Dioxide (CO2) 25      Anion Gap 8      Urea Nitrogen 19.3      Creatinine 0.73      GFR Estimate 83      Calcium 9.0      Glucose 97     CBC WITH PLATELETS AND DIFFERENTIAL    WBC Count 8.5      RBC Count 4.17      Hemoglobin 12.2      Hematocrit 35.8      MCV 86      MCH 29.3      MCHC 34.1      RDW 14.1      Platelet Count 374      % Neutrophils 63      % Lymphocytes 24      % Monocytes 11      % Eosinophils 2      % Basophils 1      % Immature Granulocytes 1      NRBCs per 100 WBC 0      Absolute Neutrophils 5.3      Absolute Lymphocytes 2.1      Absolute Monocytes 0.9      Absolute Eosinophils 0.2      Absolute Basophils 0.0      Absolute Immature Granulocytes 0.0      Absolute NRBCs 0.0     HEMOGLOBIN A1C   LIPID PROFILE        Patient's  "response to treatments and/or interventions: Tolerated.    To be done/followed up on inpatient unit:  Observation; neuro checks Q4    Does this patient have any cognitive concerns?:  N/A    Activity level - Baseline/Home:  Independent  Activity Level - Current:   Independent    Patient's Preferred language: English   Needed?: No    Isolation: None  Infection: Not Applicable  Patient tested for COVID 19 prior to admission: NO  Bariatric?: No    Vital Signs:   Vitals:    12/30/24 1417 12/31/24 0100   BP: (!) 143/82 (!) 172/105   Pulse: 82    Resp: 16 18   Temp: 98  F (36.7  C)    TempSrc: Temporal    SpO2: 97% 96%   Weight: 59 kg (130 lb)    Height: 1.613 m (5' 3.5\")        Cardiac Rhythm:     Was the PSS-3 completed:   Yes  What interventions are required if any?               Family Comments: N/A  OBS brochure/video discussed/provided to patient/family: Yes              Name of person given brochure if not patient:               Relationship to patient:     For the majority of the shift this patient's behavior was Green.   Behavioral interventions performed were .    ED NURSE PHONE NUMBER: [id number]        "

## 2025-01-01 NOTE — PLAN OF CARE
Reason for Admission: R/O TIA    Cognitive/Mentation: A/Ox 4  Neuros/CMS: Intact ex R thumb numbness. Intermittent lip numbness.   VS: stable on RA SBP <220.   Tele: NSR.  /GI: Continent.   Pulmonary: LS clear.  Pain: 2-3/10 HA pain scheduled tylenol.     Skin: BLE bruising on feet and ankles  Activity: IND  Diet: regular with thin liquids. Takes pills whole.     Discharge: home.     Aggression Stoplight Tool: green    End of shift summary: pt completed ECHO, results good, cleared for discharge by neurology.

## 2025-01-01 NOTE — DISCHARGE SUMMARY
"[facility]  Hospitalist Discharge Summary      Date of Admission:  12/30/2024  Date of Discharge:  12/31/2024  7:37 PM  Discharging Provider: Juan Sullivan MD  Discharge Service: Hospitalist Service    Discharge Diagnoses   Subjective R thumb paraesthesia, r/o TIA  Transient R inferior Lip paraesthesia  Possible chronic CVA based on CT 7/2024 R corona radiate  Hx TIA HLD  See below      Clinically Significant Risk Factors          Follow-ups Needed After Discharge   Follow-up Appointments       Hospital Follow-up with Existing Primary Care Provider (PCP)      Please see details below         Schedule Primary Care visit within: 7 Days       Follow Up      Neurology clinic                Unresulted Labs Ordered in the Past 30 Days of this Admission       No orders found from 11/30/2024 to 12/31/2024.        These results will be followed up by NA    Discharge Disposition   Discharged to home  Condition at discharge: Stable    Hospital Course   79F hx TIA, Asthma, Afib, RCC presenting w/ transient lip and R thumb numbness     --Hx: Patient while speaking to her  had acute onset R complete humb numbness, numbness in the fingertips of the R 2-4 fingers radiating to the R ventral wrist, along w/ R inferior lip. Afterwards began having a HA. On presentation, reports that the lip numbness resolved, the finger tip numbness has improved, but the complete thumb numbness persists. HA progressed during MRI but improved w/ tylenol in the ER. Complaints of persistent HA at the top of her head and neck pain/stiffness though patient states she has hx of similar neck discomfort in the past. Compliant w/ all medications. At no point was vision compromised. Of note, patient has R wrist carpal tunnel repair. Reports using the computer with her R hand a lot.   --Vitals/Exam: BP (!) 172/105   Pulse 82   Temp 98  F (36.7  C) (Temporal)   Resp 18   Ht 1.613 m (5' 3.5\")   Wt 59 kg (130 lb)   LMP  " (LMP Unknown)   SpO2 96%   BMI 22.67 kg/m   CN2-12 groslly intact with no lip numbness. On examination, the numbness is not objective/reproducible, but is subjective with patient stating that she can feel objects w/ her thumb, but once it is free floating, she can't feel the thumb.  Has LE edema. Bilateral ventral  wrist nontender swellings. No neck stiffness  --EKG: NSR, High lateral Q waves  --Labs: all wnl  --ER Course: Tylenol     Subjective R thumb paraesthesia, r/o TIA  Transient R inferior Lip paraesthesia  Possible chronic CVA based on CT 7/2024 R corona radiate  Hx TIA HLD  --MRI showed Small focus of presumed chronic hemosiderin in the right cerebellar vermis without mass effect. Marrow signal is diffusely T1 hypointensy  --Neurology consult  --OT Consult  --TTE awaited as well as Neuro update/plan  --A1c 5.6%,   --MRA without contrast (pt refusing contrast) - unremarkable  --q4 neurochecks - defer to neuro  --Permissive HTN upto 220  --Tylenol for HA/subjective Neck stiffness  *On discharge day:  Symptoms stable/slightly resolved  Ok per neuro to discharge home and follow up with pcp and neuro in clinic  Record BP at home and bring to relay to outpatient team  Echo unremarkable with normal EF    Statin to be titrated with pcp or neuro  Apixaban 2.5 mg bid sent to [facility] [address] per request  (Not new medicine)       Afib, hx PVI 2023  --resume PTA diltiazem   --Note, patient's Eliquis was recently cut down to 2.5mg q12 (from 5q12 due to turning 79, however, patient has been taking it now has 5mg once a day thinking its the same as 2.5mg q12 as she can't break her 5mg dose tablet into halves without the tablet disintegrating. Patient states she never received the smaller dose of 2.5mg tablet/pills.  --plan to prescribe 2.5mg q12 tablets on discharge - as above  --Resumed Eliquis  --will need new cardiology follow-up as patient has been dropped by cardiologist will discuss with PCP after  discharge     LE edema  --To the patient, the edema is baseline  --Defer inpatient diuresis for now, though should have outpatient PCP follow-up     Chronic stable conditions, resume meds once confirmed  Asthma  Sarcoidosis  GERD  Constipation  RCC s/p R nephrectomy   CKD2      Consultations This Hospital Stay   NEUROLOGY IP STROKE CONSULT  OCCUPATIONAL THERAPY ADULT IP CONSULT    Code Status   Prior    Time Spent on this Encounter   I, Juan Sullivan MD, personally saw the patient today and spent greater than 30 minutes discharging this patient.       Juan Sullivan MD  [facility]  [address] [place] [address]  Phone: [phone]  ______________________________________________________________________    Physical Exam   Vital Signs:     BP: (!) 140/93 Pulse: 74   Resp: 16 SpO2: 94 % O2 Device: None (Room air)    Weight: 130 lbs 0 oz    Gen: NAD, pleasant  HEENT: EOMI, MMM  Resp: no focal crackles,  no wheezes, no increased work of resp  CV: S1S2 heard, reg rhythm, reg rate  Abdo: soft, nontender, nondistended, bowel sounds present  Ext: calves nontender, well perfused  Neuro: aa, conversant, moving all ext grossly unremarkably, CN grossly intact, no facial asymmetry         Primary Care Physician   Nadira Rob    Discharge Orders      Occupational Therapy  Referral      Reason for your hospital stay    R hand numbness and lip numbness     Activity    Your activity upon discharge: activity as tolerated     Follow Up    Neurology clinic     Discharge Instructions    Monitor blood pressure at home and bring record to PCP/Neuro follow up.   Consider increasing atorvastatin to 20mg daily - this can be done with outpatient providers.  Follow up with PCP and Neurology clinic.  We have sent a new apixaban Rx to your pharmacy - 2.5 mg tablets have been ordered to ensure appropriate dosing.     Diet    Follow this diet upon discharge: Current Diet:Orders  Placed This Encounter      Regular Diet Adult     Stroke Hospital Follow Up (for neurologist use only)    [facility] will call you to coordinate care as prescribed by your provider. If you don t hear from a representative within 2 business days, please call [phone].       Hospital Follow-up with Existing Primary Care Provider (PCP)    Please see details below            Significant Results and Procedures   Most Recent 3 CBC's:  Recent Labs   Lab Test 12/31/24  0824 12/30/24  1427 07/18/24  1315   WBC 8.0 8.5 7.1   HGB 12.2 12.2 11.8   MCV 86 86 87    374 345     Most Recent 3 BMP's:  Recent Labs   Lab Test 12/31/24  0824 12/30/24  1427 09/13/24  1517 07/18/24  1315    135  --  130*   POTASSIUM 4.1 3.9  --  4.2   CHLORIDE 101 102 98 96*   CO2 24 25  --  27   BUN 15.4 19.3  --  13.7   CR 0.61 0.73  --  0.64   ANIONGAP 10 8  --  7   SHINE 8.8 9.0  --  9.3   * 97  --  108*     Most Recent Cholesterol Panel:  Recent Labs   Lab Test 12/30/24  1427   CHOL 209*   *   HDL 82   TRIG 82     Most Recent Hemoglobin A1c:  Recent Labs   Lab Test 12/30/24  1427   A1C 5.6   ,   Results for orders placed or performed during the hospital encounter of 12/30/24   Head CT w/o contrast    Narrative    EXAM: CT HEAD W/O CONTRAST  LOCATION: [facility]  DATE: 12/31/2024    INDICATION: Sudden onset numbness of right hand upper extremity and right lip, hx of TIA and infarct, on AC. Now with just numbness of the right thumb and hand area.  COMPARISON: Brain MRI same day.  TECHNIQUE: Routine CT Head without IV contrast. Multiplanar reformats. Dose reduction techniques were used.    FINDINGS:  INTRACRANIAL CONTENTS: No intracranial hemorrhage, extraaxial collection, or mass effect.  No CT evidence of acute infarct. Mild volume loss and presumed chronic small vessel ischemia are stable.    VISUALIZED ORBITS/SINUSES/MASTOIDS: No intraorbital abnormality. No paranasal sinus mucosal  disease. No middle ear or mastoid effusion.    BONES/SOFT TISSUES: No acute abnormality.      Impression    IMPRESSION:  1.  Stable examination. No acute intracranial abnormality.   MR Brain w/o & w Contrast    Narrative    EXAM: MR BRAIN W/O and W CONTRAST  LOCATION: [facility]  DATE: 12/30/2024    INDICATION: Sudden onset numbness of right hand upper extremity and right lip, hx of TIA and infarct, on AC. Now with just numbness of the right thumb and hand area.  COMPARISON: None.  CONTRAST: 5 mL Gadavist.  TECHNIQUE: Routine multiplanar multisequence head MRI without and with intravenous contrast.    FINDINGS: Inadequate IV contrast is noted within the cranial structures. The postcontrast images are nondiagnostic.  INTRACRANIAL CONTENTS: No acute or subacute infarct. No mass, acute hemorrhage, or extra-axial fluid collections. Focal area of chronic hemosiderin along the undersurface of the right tentorial leaflets. Mild burden scattered presumed chronic small   vessel ischemia. Normal ventricles and sulci. Normal position of the cerebellar tonsils. No pathologic contrast enhancement.    SELLA: No abnormality accounting for technique.    OSSEOUS STRUCTURES/SOFT TISSUES: Calvarial marrow is diffusely T1 hypointense which is nonspecific. The major intracranial vascular flow voids are maintained.     ORBITS: No abnormality accounting for technique.     SINUSES/MASTOIDS: No paranasal sinus mucosal disease. No middle ear or mastoid effusion.       Impression    IMPRESSION:  1.  No acute intracranial abnormality.    2.  Age-related and chronic ischemic changes.    3.  Small focus of presumed chronic hemosiderin in the right cerebellar vermis without mass effect.    4.  Marrow signal is diffusely T1 hypointense which can be seen in both benign and malignant marrow replacement processes.    5.  The postcontrast images/sequences are nondiagnostic.   MRA Neck (Carotids) wo Contrast    Narrative     EXAM: MRA NECK (CAROTIDS) W/O CONTRAST, MRA BRAIN ([place]) W/O CONTRAST  LOCATION: [facility]  DATE: 12/31/2024    INDICATION: Patient with episode of right lip tingling and right finger tingling, now with persistent right thumb numbness, unable to get CT vessel angiography d t IV infiltration x2  COMPARISON: None.  TECHNIQUE:   2) 3D time-of-flight head MRA without intravenous contrast.  3) Neck MRA without IV contrast. Stenosis measurements made according to NASCET criteria unless otherwise specified.      FINDINGS:  HEAD MRA:   ANTERIOR CIRCULATION: No stenosis/occlusion, aneurysm, or high flow vascular malformation. Fetal origin of the left posterior cerebral artery from the anterior circulation.    POSTERIOR CIRCULATION: No stenosis/occlusion, aneurysm, or high flow vascular malformation. Dominant left and smaller right vertebral artery contribute to a normal basilar artery.     NECK MRA:   RIGHT CAROTID: No measurable stenosis or dissection.    LEFT CAROTID: No measurable stenosis or dissection.    VERTEBRAL ARTERIES: No focal stenosis or dissection. Dominant left and smaller right vertebral arteries.    AORTIC ARCH: Suboptimally visualized on this exam.      Impression    IMPRESSION:  HEAD MRA:   1.  Unremarkable MRA [place].    NECK MRA:  1.  Unremarkable time-of-flight neck MRA.   MRA Brain ([place]) wo Contrast    Narrative    EXAM: MRA NECK (CAROTIDS) W/O CONTRAST, MRA BRAIN ([place]) W/O CONTRAST  LOCATION: [facility]  DATE: 12/31/2024    INDICATION: Patient with episode of right lip tingling and right finger tingling, now with persistent right thumb numbness, unable to get CT vessel angiography d t IV infiltration x2  COMPARISON: None.  TECHNIQUE:   2) 3D time-of-flight head MRA without intravenous contrast.  3) Neck MRA without IV contrast. Stenosis measurements made according to NASCET criteria unless otherwise  specified.      FINDINGS:  HEAD MRA:   ANTERIOR CIRCULATION: No stenosis/occlusion, aneurysm, or high flow vascular malformation. Fetal origin of the left posterior cerebral artery from the anterior circulation.    POSTERIOR CIRCULATION: No stenosis/occlusion, aneurysm, or high flow vascular malformation. Dominant left and smaller right vertebral artery contribute to a normal basilar artery.     NECK MRA:   RIGHT CAROTID: No measurable stenosis or dissection.    LEFT CAROTID: No measurable stenosis or dissection.    VERTEBRAL ARTERIES: No focal stenosis or dissection. Dominant left and smaller right vertebral arteries.    AORTIC ARCH: Suboptimally visualized on this exam.      Impression    IMPRESSION:  HEAD MRA:   1.  Unremarkable MRA [place].    NECK MRA:  1.  Unremarkable time-of-flight neck MRA.   Echocardiogram Complete     Value    LVEF  55-60%    Narrative    [id number]  [id number]  [id number]  [id number]     [facility]  Echocardiography Laboratory  [address] [place] [address]     Name: SAMEER KIRKPATRICK  MRN: [id number]  : 1945  Study Date: 2024 03:57 PM  Age: 79 yrs  Gender: Female  Patient Location: [facility]  Reason For Study: CVA  Ordering Physician: KJ العلي  Referring Physician: Nadira Holbrook  Performed By: Esvin Becerra     BSA: 1.6 m2  Height: 64 in  Weight: 130 lb  HR: 102  BP: 110/70 mmHg  ______________________________________________________________________________  Procedure  Echocardiogram with two-dimensional, color and spectral Doppler.  ______________________________________________________________________________  Interpretation Summary     The left ventricle is normal in size.  Left ventricular systolic function is normal. The visual ejection fraction is  55-60%.  Normal left ventricular wall motion  Diastolic Doppler findings (E/E' ratio and/or other parameters) suggest left  ventricular filling pressures are normal.  The  right ventricle is normal in structure, function and size.  Sinus rhythm was noted.  The study was technically difficult. Compared to prior study, there is no  significant change.  ______________________________________________________________________________  Left Ventricle  The left ventricle is normal in size. There is normal left ventricular wall  thickness. Left ventricular systolic function is normal. The visual ejection  fraction is 55-60%. Diastolic Doppler findings (E/E' ratio and/or other  parameters) suggest left ventricular filling pressures are normal. Normal left  ventricular wall motion.     Right Ventricle  The right ventricle is normal in structure, function and size.     Atria  The left atrium is mildly dilated. Right atrial size is normal. There is no  atrial shunt seen.     Mitral Valve  The mitral valve is normal in structure and function. There is trace mitral  regurgitation.     Tricuspid Valve  The tricuspid valve is normal in structure and function. There is trace  tricuspid regurgitation. IVC diameter <2.1 cm collapsing >50% with sniff  suggests a normal RA pressure of 3 mmHg. The right ventricular systolic  pressure is approximated at 19.1 mmHg plus the right atrial pressure. Right  ventricle systolic pressure estimate normal.     Aortic Valve  The aortic valve is normal in structure and function. No aortic regurgitation  is present. No aortic stenosis is present.     Pulmonic Valve  The pulmonic valve is not well seen, but is grossly normal. There is trace  pulmonic valvular regurgitation.     Vessels  Normal size aorta. The inferior vena cava was normal in size with preserved  respiratory variability.     Pericardium  There is no pericardial effusion.     Rhythm  Sinus rhythm was noted.  ______________________________________________________________________________  MMode/2D Measurements & Calculations  IVSd: 0.73 cm     LVIDd: 4.8 cm  LVIDs: 3.6 cm  LVPWd: 1.0 cm  FS: 25.5 %  LV  mass(C)d: 146.3 grams  LV mass(C)dI: 89.8 grams/m2  Ao root diam: 2.4 cm  asc Aorta Diam: 3.5 cm  LVOT diam: 1.9 cm  LVOT area: 2.8 cm2  Ao root diam index Ht(cm/m): 1.5  Ao root diam index BSA (cm/m2): 1.5  Asc Ao diam index BSA (cm/m2): 2.1  Asc Ao diam index Ht(cm/m): 2.1  LA Volume (BP): 55.3 ml     LA Volume Index (BP): 33.9 ml/m2  RV Base: 3.3 cm  RWT: 0.43  TAPSE: 2.5 cm     Doppler Measurements & Calculations  MV E max moshe: 59.1 cm/sec  MV A max moshe: 51.8 cm/sec  MV E/A: 1.1  MV dec slope: 335.9 cm/sec2  MV dec time: 0.18 sec  PA acc time: 0.13 sec  TR max moshe: 218.5 cm/sec  TR max P.1 mmHg  E/E' av.1  Lateral E/e': 7.1  Medial E/e': 7.1  RV S Moshe: 9.9 cm/sec     ______________________________________________________________________________  Report approved by: Brennan Majano MD on 2024 05:44 PM             Discharge Medications   Discharge Medication List as of 2024  6:29 PM        CONTINUE these medications which have CHANGED    Details   apixaban ANTICOAGULANT (ELIQUIS) 2.5 MG tablet Take 1 tablet (2.5 mg) by mouth 2 times daily., Disp-60 tablet, R-0, E-Prescribe           CONTINUE these medications which have NOT CHANGED    Details   albuterol (PROAIR HFA/PROVENTIL HFA/VENTOLIN HFA) 108 (90 Base) MCG/ACT inhaler Inhale 2 puffs into the lungs every 4 hours as needed, Historical      atorvastatin (LIPITOR) 10 MG tablet Take 1 tablet (10 mg) by mouth daily., Disp-90 tablet, R-3, E-Prescribe      budesonide (PULMICORT) 0.5 MG/2ML neb solution Take 0.5 mg by nebulization daily as needed., Historical      calcium carbonate (OS-SHINE) 1500 (600 Ca) MG tablet Take 600 mg by mouth daily., Historical      cetirizine (ZYRTEC) 10 MG tablet Take 10 mg by mouth every evening., Historical      conjugated estrogens (PREMARIN) 0.625 MG/GM vaginal cream Place vaginally daily as needed., Historical      diltiazem ER (TIAZAC) 120 MG 24 hr ER beaded capsule Take 1 capsule (120 mg) by mouth 2 times daily.,  Disp-180 capsule, R-3, E-Prescribe      docusate sodium (DSS) 100 MG capsule Take 1 capsule by mouth every evening., Historical      fish oil-omega-3 fatty acids 1000 MG capsule Take 1 g by mouth 2 times daily., Historical      fluticasone (FLONASE) 50 MCG/ACT nasal spray Spray 1 spray into both nostrils daily as needed for rhinitis or allergies., Historical      ipratropium (ATROVENT) 0.02 % neb solution Inhale 0.5 mg into the lungs every 6 hours as needed for wheezing., Historical      omeprazole (PRILOSEC) 20 MG DR capsule Take 20 mg by mouth daily as needed., Historical      polyethylene glycol (MIRALAX) 17 g packet Take 1 packet by mouth daily as needed for constipation., Historical      polyethylene glycol-propylene glycol (SYSTANE ULTRA) 0.4-0.3 % SOLN ophthalmic solution Place 1-2 drops into both eyes 4 times daily as needed for dry eyes., Historical      TRELEGY ELLIPTA 100-62.5-25 MCG/ACT oral inhaler Inhale 2 puffs into the lungs daily, DACIA, Historical      montelukast (SINGULAIR) 10 MG tablet Take 1 tablet by mouth At Bedtime, Historical           Allergies   Allergies   Allergen Reactions    Sulfa Antibiotics Unknown     Long time ago, she cannot remember    Diazepam Other (See Comments)     Makes more anxious    Meperidine Nausea and Vomiting and Unknown    Morphine Nausea and Unknown    Penicillins Itching    Zafirlukast      She does not remember

## 2025-01-02 ENCOUNTER — PATIENT OUTREACH (OUTPATIENT)
Dept: CARE COORDINATION | Facility: CLINIC | Age: 80
End: 2025-01-02

## 2025-01-02 ENCOUNTER — APPOINTMENT (OUTPATIENT)
Dept: CT IMAGING | Facility: CLINIC | Age: 80
End: 2025-01-02
Attending: EMERGENCY MEDICINE
Payer: COMMERCIAL

## 2025-01-02 ENCOUNTER — HOSPITAL ENCOUNTER (EMERGENCY)
Facility: CLINIC | Age: 80
Discharge: HOME | End: 2025-01-02
Attending: EMERGENCY MEDICINE
Payer: COMMERCIAL

## 2025-01-02 VITALS
OXYGEN SATURATION: 98 % | RESPIRATION RATE: 18 BRPM | DIASTOLIC BLOOD PRESSURE: 75 MMHG | TEMPERATURE: 98 F | SYSTOLIC BLOOD PRESSURE: 141 MMHG | HEART RATE: 78 BPM

## 2025-01-02 DIAGNOSIS — R51.9 ACUTE NONINTRACTABLE HEADACHE, UNSPECIFIED HEADACHE TYPE: ICD-10-CM

## 2025-01-02 DIAGNOSIS — R20.2 PARESTHESIAS: ICD-10-CM

## 2025-01-02 LAB
ALBUMIN SERPL BCG-MCNC: 3.8 G/DL (ref 3.5–5.2)
ALP SERPL-CCNC: 83 U/L (ref 40–150)
ALT SERPL W P-5'-P-CCNC: 19 U/L (ref 0–50)
ANION GAP SERPL CALCULATED.3IONS-SCNC: 10 MMOL/L (ref 7–15)
AST SERPL W P-5'-P-CCNC: 22 U/L (ref 0–45)
ATRIAL RATE - MUSE: 70 BPM
BASOPHILS # BLD AUTO: 0 10E3/UL (ref 0–0.2)
BASOPHILS NFR BLD AUTO: 1 %
BILIRUB SERPL-MCNC: 0.5 MG/DL
BUN SERPL-MCNC: 16.3 MG/DL (ref 8–23)
CALCIUM SERPL-MCNC: 9.1 MG/DL (ref 8.8–10.4)
CHLORIDE SERPL-SCNC: 100 MMOL/L (ref 98–107)
CREAT SERPL-MCNC: 0.65 MG/DL (ref 0.51–0.95)
DIASTOLIC BLOOD PRESSURE - MUSE: NORMAL MMHG
EGFRCR SERPLBLD CKD-EPI 2021: 89 ML/MIN/1.73M2
EOSINOPHIL # BLD AUTO: 0.2 10E3/UL (ref 0–0.7)
EOSINOPHIL NFR BLD AUTO: 2 %
ERYTHROCYTE [DISTWIDTH] IN BLOOD BY AUTOMATED COUNT: 13.9 % (ref 10–15)
GLUCOSE SERPL-MCNC: 82 MG/DL (ref 70–99)
HCO3 SERPL-SCNC: 24 MMOL/L (ref 22–29)
HCT VFR BLD AUTO: 35.7 % (ref 35–47)
HGB BLD-MCNC: 11.9 G/DL (ref 11.7–15.7)
HOLD SPECIMEN: NORMAL
IMM GRANULOCYTES # BLD: 0.1 10E3/UL
IMM GRANULOCYTES NFR BLD: 1 %
INTERPRETATION ECG - MUSE: NORMAL
LYMPHOCYTES # BLD AUTO: 2.1 10E3/UL (ref 0.8–5.3)
LYMPHOCYTES NFR BLD AUTO: 24 %
MCH RBC QN AUTO: 28.6 PG (ref 26.5–33)
MCHC RBC AUTO-ENTMCNC: 33.3 G/DL (ref 31.5–36.5)
MCV RBC AUTO: 86 FL (ref 78–100)
MONOCYTES # BLD AUTO: 0.9 10E3/UL (ref 0–1.3)
MONOCYTES NFR BLD AUTO: 11 %
NEUTROPHILS # BLD AUTO: 5.4 10E3/UL (ref 1.6–8.3)
NEUTROPHILS NFR BLD AUTO: 62 %
NRBC # BLD AUTO: 0 10E3/UL
NRBC BLD AUTO-RTO: 0 /100
P AXIS - MUSE: 48 DEGREES
PLATELET # BLD AUTO: 286 10E3/UL (ref 150–450)
POTASSIUM SERPL-SCNC: 4.2 MMOL/L (ref 3.4–5.3)
PR INTERVAL - MUSE: 180 MS
PROT SERPL-MCNC: 6.1 G/DL (ref 6.4–8.3)
QRS DURATION - MUSE: 94 MS
QT - MUSE: 392 MS
QTC - MUSE: 423 MS
R AXIS - MUSE: 14 DEGREES
RBC # BLD AUTO: 4.16 10E6/UL (ref 3.8–5.2)
SODIUM SERPL-SCNC: 134 MMOL/L (ref 135–145)
SYSTOLIC BLOOD PRESSURE - MUSE: NORMAL MMHG
T AXIS - MUSE: 42 DEGREES
TROPONIN T SERPL HS-MCNC: 21 NG/L
TROPONIN T SERPL HS-MCNC: 23 NG/L
VENTRICULAR RATE- MUSE: 70 BPM
WBC # BLD AUTO: 8.7 10E3/UL (ref 4–11)

## 2025-01-02 PROCEDURE — 85048 AUTOMATED LEUKOCYTE COUNT: CPT | Performed by: EMERGENCY MEDICINE

## 2025-01-02 PROCEDURE — 70450 CT HEAD/BRAIN W/O DYE: CPT

## 2025-01-02 PROCEDURE — 85004 AUTOMATED DIFF WBC COUNT: CPT | Performed by: EMERGENCY MEDICINE

## 2025-01-02 PROCEDURE — 36415 COLL VENOUS BLD VENIPUNCTURE: CPT | Performed by: EMERGENCY MEDICINE

## 2025-01-02 PROCEDURE — 84484 ASSAY OF TROPONIN QUANT: CPT | Performed by: EMERGENCY MEDICINE

## 2025-01-02 PROCEDURE — 250N000013 HC RX MED GY IP 250 OP 250 PS 637: Performed by: EMERGENCY MEDICINE

## 2025-01-02 PROCEDURE — 80053 COMPREHEN METABOLIC PANEL: CPT | Performed by: EMERGENCY MEDICINE

## 2025-01-02 RX ORDER — ACETAMINOPHEN 500 MG
1000 TABLET ORAL ONCE
Status: COMPLETED | OUTPATIENT
Start: 2025-01-02 | End: 2025-01-02

## 2025-01-02 RX ADMIN — ACETAMINOPHEN 1000 MG: 500 TABLET, FILM COATED ORAL at 10:08

## 2025-01-02 ASSESSMENT — ACTIVITIES OF DAILY LIVING (ADL)
ADLS_ACUITY_SCORE: 48

## 2025-01-02 NOTE — PROGRESS NOTES
[facility] Care Resource Center: [facility]    Background: Transitional Care Management program identified per system criteria and reviewed by [facility] Resource [place] team for possible outreach.    Assessment: Upon chart review, [facility] Team member will not proceed with patient outreach related to this episode of Transitional Care Management program due to reason below:    Patient has presented to Emergency Department, been readmitted to hospital, or transferred to another hospital.    Plan: Transitional Care Management episode addressed appropriately per reason noted above.      Kamilah Wilson  Community Health Worker  [facility]  [phone]      *Connected Care Resource Team does NOT follow patient ongoing. Referrals are identified based on internal discharge reports and the outreach is to ensure patient has an understanding of their discharge instructions.

## 2025-01-02 NOTE — ED TRIAGE NOTES
Pt comes in today with tingling all over and headache 2/10 that started at 0845 today   Pt was recently discharged from here on December 29th with diagnosis of a TIA   Pt states symptoms feel the same as last time   Pt is on eliquis       Ems   Bgl 73  IV 20g right forearm

## 2025-01-02 NOTE — ED PROVIDER NOTES
Emergency Department Note      History of Present Illness     Chief Complaint  Tingling and Headache    HPI  Margareth Hogue is a 79 year old female with history of AFIB on Eliquis, TIA, and hyperlipidemia who presents to the ED via EMS for evaluation of tingling and headache. She reports ongoing numbness and tingling since 12/29 and a new headache that returned this morning. Her numbness began on her right thumb to her right fingers, then to her left hand, bilateral feet, left cheek and intermittent on her tongue. She rates her current headache a 2/10 and would have neck pain with it. Patient doesn't normally get headaches. She felt nauseous earlier. Denies vomiting, chest pain, back pain, abdominal pain, fever,  vision changes, speech changes, cough, sore throat, or urinary symptoms. In her last ED visit, her numbness was on one side. Family history of MS. Patient lives with her . No use of pain medications today.     Independent Historian  None    Review of External Notes  I reviewed the discharge summary from 12/31/2024.  Patient was admitted for similar symptoms and was seen by neurology for possible TIA.  Patient was only having numbness on the right side of her body during that hospitalization.  I reviewed the ER visit from 7/18/2024 when patient also was having a headache, neck pain, paresthesias and was discharged.  Past Medical History   Medical History and Problem List   Osteoarthritis  Hyponatremia  Allergic conjunctivitis of both eyes  Bilateral sensorineural hearing loss  Chronic insomnia  GERD  Adenomatous polyp of colon  Renal cell carcinoma  Hyperlipidemia  IBS  Asthma  Paroxysmal atrial fibrillation  Sarcoidosis, lung  Seasonal allergies  TIA    Medications   Albuterol inhaler  Eliquis  Lipitor  Zyrtec  Tiazac  Prilosec    Surgical History   Nephrectomy, unilateral  Physical Exam   Patient Vitals for the past 24 hrs:   BP Temp Temp src Pulse Resp SpO2   01/02/25 0942 (!) 141/75 98  F (36.7   C) Temporal 78 18 98 %     Physical Exam  Vitals and nursing note reviewed.   Constitutional:       General: She is not in acute distress.     Appearance: She is not ill-appearing.   HENT:      Head: Normocephalic and atraumatic.      Right Ear: External ear normal.      Left Ear: External ear normal.      Nose: Nose normal.      Mouth/Throat:      Mouth: Mucous membranes are moist.   Eyes:      Extraocular Movements: Extraocular movements intact.      Conjunctiva/sclera: Conjunctivae normal.      Pupils: Pupils are equal, round, and reactive to light.   Cardiovascular:      Rate and Rhythm: Normal rate and regular rhythm.      Heart sounds: No murmur heard.  Pulmonary:      Effort: Pulmonary effort is normal. No respiratory distress.      Breath sounds: Normal breath sounds. No wheezing, rhonchi or rales.   Abdominal:      General: Abdomen is flat. Bowel sounds are normal. There is no distension.      Palpations: Abdomen is soft.      Tenderness: There is no abdominal tenderness. There is no guarding or rebound.   Musculoskeletal:         General: No deformity or signs of injury.      Cervical back: Normal range of motion and neck supple.   Skin:     General: Skin is warm and dry.      Findings: No rash.   Neurological:      Mental Status: She is alert and oriented to person, place, and time.      Cranial Nerves: No cranial nerve deficit.      Sensory: No sensory deficit.      Motor: No weakness.   Psychiatric:         Behavior: Behavior normal.           Diagnostics   Lab Results   Labs Ordered and Resulted from Time of ED Arrival to Time of ED Departure   COMPREHENSIVE METABOLIC PANEL - Abnormal       Result Value    Sodium 134 (*)     Potassium 4.2      Carbon Dioxide (CO2) 24      Anion Gap 10      Urea Nitrogen 16.3      Creatinine 0.65      GFR Estimate 89      Calcium 9.1      Chloride 100      Glucose 82      Alkaline Phosphatase 83      AST 22      ALT 19      Protein Total 6.1 (*)     Albumin 3.8       Bilirubin Total 0.5     TROPONIN T, HIGH SENSITIVITY - Abnormal    Troponin T, High Sensitivity 21 (*)    TROPONIN T, HIGH SENSITIVITY - Abnormal    Troponin T, High Sensitivity 23 (*)    CBC WITH PLATELETS AND DIFFERENTIAL    WBC Count 8.7      RBC Count 4.16      Hemoglobin 11.9      Hematocrit 35.7      MCV 86      MCH 28.6      MCHC 33.3      RDW 13.9      Platelet Count 286      % Neutrophils 62      % Lymphocytes 24      % Monocytes 11      % Eosinophils 2      % Basophils 1      % Immature Granulocytes 1      NRBCs per 100 WBC 0      Absolute Neutrophils 5.4      Absolute Lymphocytes 2.1      Absolute Monocytes 0.9      Absolute Eosinophils 0.2      Absolute Basophils 0.0      Absolute Immature Granulocytes 0.1      Absolute NRBCs 0.0       Imaging  CT Head w/o Contrast   Final Result   IMPRESSION:   No evidence of acute intracranial hemorrhage, mass, or   herniation.         DARLENE CASTELLANOS MD            SYSTEM ID:  [id number]        Report per radiology    EKG   ECG results from 01/02/25   EKG 12-lead, tracing only     Value    Systolic Blood Pressure     Diastolic Blood Pressure     Ventricular Rate 70    Atrial Rate 70    UT Interval 180    QRS Duration 94        QTc 423    P Axis 48    R AXIS 14    T Axis 42    Interpretation ECG      Sinus rhythm  Normal ECG  When compared with ECG of 30-Dec-2024 14:27,  Criteria for Septal infarct are no longer Present  Read at 1055       Independent Interpretation  CT Head: No intracranial hemorrhage.  ED Course    Medications Administered  Medications   acetaminophen (TYLENOL) tablet 1,000 mg (1,000 mg Oral $Given 1/2/25 1008)     Procedures  Procedures     Discussion of Management  None    Additional Documentation  None    ED Course  ED Course as of 01/02/25 1324   Thu Jan 02, 2025   0980 I obtained history and examined the patient as noted above.    1250 I rechecked the patient and explained findings.    1258 I prepared the patient to be discharged home.       Medical Decision Making / Diagnosis   CMS Diagnoses: None    MIPS     None    MDM  79-year-old female presenting with a return of the headache with paresthesias.  She was just discharged 3 days ago for similar symptoms, however at that time the paresthesias were only on the right side as well as the face.  Now she has paresthesias in both hands and both feet as well as the face.  I do not think that this is likely to be cerebrovascular etiology given the bilateral nature of her symptoms.  I do not appreciate any significant deficits on her exam.  I did note that she was seen here back in July with similar symptoms as well.  It is unclear what the etiology of her symptoms are.  This could be some sort of an atypical migrainous disorder.  There is no signs of any intracranial bleed or mass on her CT today.  I have low suspicion for a cervical lesion given that she has facial symptoms as well and no motor deficits or bowel/bladder malfunction.  There is no hypocalcemia or other electrolyte abnormality.  I reassured the patient and stressed the importance of follow-up with primary care.  I will also provide contact info for neurology follow-up.  We discussed return precautions.    Disposition  The patient was discharged.     ICD-10 Codes:    ICD-10-CM    1. Acute nonintractable headache, unspecified headache type  R51.9       2. Paresthesias  R20.2          Discharge Medications  Discharge Medication List as of 1/2/2025  1:07 PM        Scribe Disclosure:  I, Dipika Veras, am serving as a scribe at 10:00 AM on 1/2/2025 to document services personally performed by Sam Montanez MD based on my observations and the provider's statements to me.      Sam Montanez MD  01/02/25 8455

## 2025-01-02 NOTE — ED NOTES
Bed: ED28  Expected date:   Expected time:   Means of arrival:   Comments:  E2  79 F poss UTI  [id number]

## 2025-01-30 ENCOUNTER — TELEPHONE (OUTPATIENT)
Dept: CARE COORDINATION | Facility: CLINIC | Age: 80
End: 2025-01-30
Payer: COMMERCIAL

## 2025-01-30 NOTE — TELEPHONE ENCOUNTER
Patient was identified by stroke RNCC as needing to have stroke follow up rescheduled with an in-network provider. Referral order was faxed to [facility] and [employer] message was sent to patient regarding this, but patient has not viewed the [employer] message yet.    Anastasiia, can you please try to reach patient to cancel her appt with our clinic since we cannot see patients with [employer] insurance?      Sapphire Riley BS, RN, SCRN  RN Stroke Neurology Care Coordinator  [facility] Line

## 2025-02-03 NOTE — TELEPHONE ENCOUNTER
No change noted to upcoming appt to confirm that pt can be seen with her [employer] insurance plan. Routing again to our schedulers to please speak with pt and either confirm that we can see her given her currently listed insurance, or give her the number to [facility] to reschedule her follow up with them.    Thank you!  Sapphire Riley BS, RN, SCRN  RN Stroke Neurology Care Coordinator  [facility]

## 2025-02-03 NOTE — TELEPHONE ENCOUNTER
Per scheduling, layton ornelas is accepted for 2025 year. So no changes needed regarding upcoming appt.      Sapphire Riley BS, RN, SCRN  RN Stroke Neurology Care Coordinator  [facility] Service Line

## 2025-03-05 ENCOUNTER — APPOINTMENT (OUTPATIENT)
Dept: CT IMAGING | Facility: CLINIC | Age: 80
DRG: 281 | End: 2025-03-05
Attending: EMERGENCY MEDICINE
Payer: COMMERCIAL

## 2025-03-05 ENCOUNTER — APPOINTMENT (OUTPATIENT)
Dept: GENERAL RADIOLOGY | Facility: CLINIC | Age: 80
DRG: 281 | End: 2025-03-05
Attending: EMERGENCY MEDICINE
Payer: COMMERCIAL

## 2025-03-05 ENCOUNTER — HOSPITAL ENCOUNTER (INPATIENT)
Facility: CLINIC | Age: 80
DRG: 281 | End: 2025-03-05
Attending: EMERGENCY MEDICINE | Admitting: INTERNAL MEDICINE
Payer: COMMERCIAL
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
## 2025-03-05 DIAGNOSIS — I48.91 ATRIAL FIBRILLATION WITH RVR (H): ICD-10-CM

## 2025-03-05 DIAGNOSIS — I51.81 TAKOTSUBO CARDIOMYOPATHY: Primary | ICD-10-CM

## 2025-03-05 DIAGNOSIS — I21.4 NSTEMI (NON-ST ELEVATED MYOCARDIAL INFARCTION) (H): ICD-10-CM

## 2025-03-05 LAB
ALBUMIN SERPL BCG-MCNC: 3.9 G/DL (ref 3.5–5.2)
ALP SERPL-CCNC: 90 U/L (ref 40–150)
ALT SERPL W P-5'-P-CCNC: 20 U/L (ref 0–50)
ANION GAP SERPL CALCULATED.3IONS-SCNC: 12 MMOL/L (ref 7–15)
AST SERPL W P-5'-P-CCNC: 23 U/L (ref 0–45)
ATRIAL RATE - MUSE: 131 BPM
BASOPHILS # BLD AUTO: 0 10E3/UL (ref 0–0.2)
BASOPHILS NFR BLD AUTO: 0 %
BILIRUB SERPL-MCNC: 0.5 MG/DL
BUN SERPL-MCNC: 17.1 MG/DL (ref 8–23)
CALCIUM SERPL-MCNC: 9.3 MG/DL (ref 8.8–10.4)
CHLORIDE SERPL-SCNC: 101 MMOL/L (ref 98–107)
CREAT SERPL-MCNC: 0.58 MG/DL (ref 0.51–0.95)
DIASTOLIC BLOOD PRESSURE - MUSE: NORMAL MMHG
EGFRCR SERPLBLD CKD-EPI 2021: >90 ML/MIN/1.73M2
EOSINOPHIL # BLD AUTO: 0.1 10E3/UL (ref 0–0.7)
EOSINOPHIL NFR BLD AUTO: 1 %
ERYTHROCYTE [DISTWIDTH] IN BLOOD BY AUTOMATED COUNT: 13.5 % (ref 10–15)
GLUCOSE SERPL-MCNC: 108 MG/DL (ref 70–99)
HCO3 SERPL-SCNC: 21 MMOL/L (ref 22–29)
HCT VFR BLD AUTO: 36.1 % (ref 35–47)
HGB BLD-MCNC: 12.1 G/DL (ref 11.7–15.7)
IMM GRANULOCYTES # BLD: 0.1 10E3/UL
IMM GRANULOCYTES NFR BLD: 0 %
INTERPRETATION ECG - MUSE: NORMAL
LYMPHOCYTES # BLD AUTO: 1.5 10E3/UL (ref 0.8–5.3)
LYMPHOCYTES NFR BLD AUTO: 12 %
MCH RBC QN AUTO: 28.7 PG (ref 26.5–33)
MCHC RBC AUTO-ENTMCNC: 33.5 G/DL (ref 31.5–36.5)
MCV RBC AUTO: 86 FL (ref 78–100)
MONOCYTES # BLD AUTO: 0.8 10E3/UL (ref 0–1.3)
MONOCYTES NFR BLD AUTO: 6 %
NEUTROPHILS # BLD AUTO: 9.6 10E3/UL (ref 1.6–8.3)
NEUTROPHILS NFR BLD AUTO: 80 %
NRBC # BLD AUTO: 0 10E3/UL
NRBC BLD AUTO-RTO: 0 /100
P AXIS - MUSE: 84 DEGREES
PLATELET # BLD AUTO: 346 10E3/UL (ref 150–450)
POTASSIUM SERPL-SCNC: 4.1 MMOL/L (ref 3.4–5.3)
PR INTERVAL - MUSE: 136 MS
PROT SERPL-MCNC: 6.5 G/DL (ref 6.4–8.3)
QRS DURATION - MUSE: 146 MS
QT - MUSE: 356 MS
QTC - MUSE: 523 MS
R AXIS - MUSE: -72 DEGREES
RBC # BLD AUTO: 4.21 10E6/UL (ref 3.8–5.2)
SODIUM SERPL-SCNC: 134 MMOL/L (ref 135–145)
SYSTOLIC BLOOD PRESSURE - MUSE: NORMAL MMHG
T AXIS - MUSE: 33 DEGREES
TROPONIN T SERPL HS-MCNC: 492 NG/L
VENTRICULAR RATE- MUSE: 130 BPM
WBC # BLD AUTO: 12 10E3/UL (ref 4–11)

## 2025-03-05 PROCEDURE — 99291 CRITICAL CARE FIRST HOUR: CPT | Mod: 25

## 2025-03-05 PROCEDURE — 85025 COMPLETE CBC W/AUTO DIFF WBC: CPT | Performed by: EMERGENCY MEDICINE

## 2025-03-05 PROCEDURE — 250N000013 HC RX MED GY IP 250 OP 250 PS 637: Performed by: EMERGENCY MEDICINE

## 2025-03-05 PROCEDURE — 82310 ASSAY OF CALCIUM: CPT | Performed by: EMERGENCY MEDICINE

## 2025-03-05 PROCEDURE — 84484 ASSAY OF TROPONIN QUANT: CPT | Performed by: EMERGENCY MEDICINE

## 2025-03-05 PROCEDURE — 70450 CT HEAD/BRAIN W/O DYE: CPT

## 2025-03-05 PROCEDURE — 80053 COMPREHEN METABOLIC PANEL: CPT | Performed by: EMERGENCY MEDICINE

## 2025-03-05 PROCEDURE — 36415 COLL VENOUS BLD VENIPUNCTURE: CPT | Performed by: EMERGENCY MEDICINE

## 2025-03-05 PROCEDURE — 93005 ELECTROCARDIOGRAM TRACING: CPT

## 2025-03-05 PROCEDURE — 71046 X-RAY EXAM CHEST 2 VIEWS: CPT

## 2025-03-05 PROCEDURE — 82247 BILIRUBIN TOTAL: CPT | Performed by: EMERGENCY MEDICINE

## 2025-03-05 PROCEDURE — 72125 CT NECK SPINE W/O DYE: CPT

## 2025-03-05 RX ORDER — ACETAMINOPHEN 500 MG
1000 TABLET ORAL ONCE
Status: COMPLETED | OUTPATIENT
Start: 2025-03-05 | End: 2025-03-05

## 2025-03-05 RX ORDER — ONDANSETRON 2 MG/ML
4 INJECTION INTRAMUSCULAR; INTRAVENOUS EVERY 30 MIN PRN
Status: DISCONTINUED | OUTPATIENT
Start: 2025-03-05 | End: 2025-03-06

## 2025-03-05 RX ADMIN — ACETAMINOPHEN 1000 MG: 500 TABLET, FILM COATED ORAL at 23:34

## 2025-03-05 ASSESSMENT — ACTIVITIES OF DAILY LIVING (ADL): ADLS_ACUITY_SCORE: 48

## 2025-03-06 ENCOUNTER — APPOINTMENT (OUTPATIENT)
Dept: CT IMAGING | Facility: CLINIC | Age: 80
DRG: 281 | End: 2025-03-06
Attending: EMERGENCY MEDICINE
Payer: COMMERCIAL

## 2025-03-06 VITALS
RESPIRATION RATE: 16 BRPM | SYSTOLIC BLOOD PRESSURE: 94 MMHG | HEIGHT: 63 IN | HEART RATE: 73 BPM | TEMPERATURE: 97.8 F | DIASTOLIC BLOOD PRESSURE: 59 MMHG | OXYGEN SATURATION: 97 % | BODY MASS INDEX: 23.39 KG/M2 | WEIGHT: 132 LBS
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
## 2025-03-06 PROBLEM — I48.91 ATRIAL FIBRILLATION WITH RVR (H): Status: ACTIVE | Noted: 2025-03-06

## 2025-03-06 PROBLEM — I21.4 NSTEMI (NON-ST ELEVATED MYOCARDIAL INFARCTION) (H): Status: ACTIVE | Noted: 2025-03-06

## 2025-03-06 LAB
ANION GAP SERPL CALCULATED.3IONS-SCNC: 10 MMOL/L (ref 7–15)
APTT PPP: 32 SECONDS (ref 22–38)
APTT PPP: 66 SECONDS (ref 22–38)
APTT PPP: 66 SECONDS (ref 22–38)
APTT PPP: 67 SECONDS (ref 22–38)
ATRIAL RATE - MUSE: 90 BPM
ATRIAL RATE - MUSE: 97 BPM
BUN SERPL-MCNC: 15.1 MG/DL (ref 8–23)
CALCIUM SERPL-MCNC: 8.4 MG/DL (ref 8.8–10.4)
CHLORIDE SERPL-SCNC: 99 MMOL/L (ref 98–107)
CHOLEST SERPL-MCNC: 158 MG/DL
CREAT SERPL-MCNC: 0.6 MG/DL (ref 0.51–0.95)
DIASTOLIC BLOOD PRESSURE - MUSE: NORMAL MMHG
DIASTOLIC BLOOD PRESSURE - MUSE: NORMAL MMHG
EGFRCR SERPLBLD CKD-EPI 2021: 90 ML/MIN/1.73M2
ERYTHROCYTE [DISTWIDTH] IN BLOOD BY AUTOMATED COUNT: 13.5 % (ref 10–15)
GLUCOSE SERPL-MCNC: 91 MG/DL (ref 70–99)
HCO3 SERPL-SCNC: 21 MMOL/L (ref 22–29)
HCT VFR BLD AUTO: 32.5 % (ref 35–47)
HDLC SERPL-MCNC: 72 MG/DL
HGB BLD-MCNC: 11 G/DL (ref 11.7–15.7)
HOLD SPECIMEN: NORMAL
INTERPRETATION ECG - MUSE: NORMAL
INTERPRETATION ECG - MUSE: NORMAL
LDLC SERPL CALC-MCNC: 80 MG/DL
MCH RBC QN AUTO: 28.7 PG (ref 26.5–33)
MCHC RBC AUTO-ENTMCNC: 33.8 G/DL (ref 31.5–36.5)
MCV RBC AUTO: 85 FL (ref 78–100)
NONHDLC SERPL-MCNC: 86 MG/DL
NT-PROBNP SERPL-MCNC: 511 PG/ML (ref 0–1800)
P AXIS - MUSE: 64 DEGREES
P AXIS - MUSE: 71 DEGREES
PLATELET # BLD AUTO: 308 10E3/UL (ref 150–450)
POTASSIUM SERPL-SCNC: 4 MMOL/L (ref 3.4–5.3)
PR INTERVAL - MUSE: 176 MS
PR INTERVAL - MUSE: 192 MS
QRS DURATION - MUSE: 132 MS
QRS DURATION - MUSE: 94 MS
QT - MUSE: 366 MS
QT - MUSE: 402 MS
QTC - MUSE: 447 MS
QTC - MUSE: 510 MS
R AXIS - MUSE: -23 DEGREES
R AXIS - MUSE: -49 DEGREES
RBC # BLD AUTO: 3.83 10E6/UL (ref 3.8–5.2)
SODIUM SERPL-SCNC: 130 MMOL/L (ref 135–145)
SYSTOLIC BLOOD PRESSURE - MUSE: NORMAL MMHG
SYSTOLIC BLOOD PRESSURE - MUSE: NORMAL MMHG
T AXIS - MUSE: 24 DEGREES
T AXIS - MUSE: 26 DEGREES
TRIGL SERPL-MCNC: 30 MG/DL
TROPONIN T SERPL HS-MCNC: 417 NG/L
TROPONIN T SERPL HS-MCNC: 588 NG/L
TROPONIN T SERPL HS-MCNC: 648 NG/L
UFH PPP CHRO-ACNC: 0.46 IU/ML
VENTRICULAR RATE- MUSE: 90 BPM
VENTRICULAR RATE- MUSE: 97 BPM
WBC # BLD AUTO: 9.3 10E3/UL (ref 4–11)

## 2025-03-06 PROCEDURE — 99223 1ST HOSP IP/OBS HIGH 75: CPT | Performed by: INTERNAL MEDICINE

## 2025-03-06 PROCEDURE — 250N000009 HC RX 250: Performed by: EMERGENCY MEDICINE

## 2025-03-06 PROCEDURE — 84484 ASSAY OF TROPONIN QUANT: CPT | Performed by: EMERGENCY MEDICINE

## 2025-03-06 PROCEDURE — 71275 CT ANGIOGRAPHY CHEST: CPT

## 2025-03-06 PROCEDURE — 80048 BASIC METABOLIC PNL TOTAL CA: CPT | Performed by: INTERNAL MEDICINE

## 2025-03-06 PROCEDURE — 36415 COLL VENOUS BLD VENIPUNCTURE: CPT | Performed by: HOSPITALIST

## 2025-03-06 PROCEDURE — 85730 THROMBOPLASTIN TIME PARTIAL: CPT

## 2025-03-06 PROCEDURE — 85520 HEPARIN ASSAY: CPT

## 2025-03-06 PROCEDURE — 250N000011 HC RX IP 250 OP 636: Performed by: HOSPITALIST

## 2025-03-06 PROCEDURE — 36415 COLL VENOUS BLD VENIPUNCTURE: CPT | Performed by: EMERGENCY MEDICINE

## 2025-03-06 PROCEDURE — 83880 ASSAY OF NATRIURETIC PEPTIDE: CPT | Performed by: EMERGENCY MEDICINE

## 2025-03-06 PROCEDURE — 250N000013 HC RX MED GY IP 250 OP 250 PS 637

## 2025-03-06 PROCEDURE — 84484 ASSAY OF TROPONIN QUANT: CPT | Performed by: INTERNAL MEDICINE

## 2025-03-06 PROCEDURE — 85027 COMPLETE CBC AUTOMATED: CPT | Performed by: INTERNAL MEDICINE

## 2025-03-06 PROCEDURE — 80061 LIPID PANEL: CPT | Performed by: INTERNAL MEDICINE

## 2025-03-06 PROCEDURE — 85730 THROMBOPLASTIN TIME PARTIAL: CPT | Performed by: EMERGENCY MEDICINE

## 2025-03-06 PROCEDURE — 85730 THROMBOPLASTIN TIME PARTIAL: CPT | Performed by: HOSPITALIST

## 2025-03-06 PROCEDURE — 250N000013 HC RX MED GY IP 250 OP 250 PS 637: Performed by: EMERGENCY MEDICINE

## 2025-03-06 PROCEDURE — 250N000011 HC RX IP 250 OP 636

## 2025-03-06 PROCEDURE — 85730 THROMBOPLASTIN TIME PARTIAL: CPT | Performed by: INTERNAL MEDICINE

## 2025-03-06 PROCEDURE — 36415 COLL VENOUS BLD VENIPUNCTURE: CPT | Performed by: INTERNAL MEDICINE

## 2025-03-06 PROCEDURE — 36415 COLL VENOUS BLD VENIPUNCTURE: CPT

## 2025-03-06 PROCEDURE — 250N000013 HC RX MED GY IP 250 OP 250 PS 637: Performed by: INTERNAL MEDICINE

## 2025-03-06 PROCEDURE — 250N000011 HC RX IP 250 OP 636: Performed by: EMERGENCY MEDICINE

## 2025-03-06 PROCEDURE — 210N000001 HC R&B IMCU HEART CARE

## 2025-03-06 RX ORDER — AMOXICILLIN 250 MG
1 CAPSULE ORAL 2 TIMES DAILY PRN
Status: DISCONTINUED | OUTPATIENT
Start: 2025-03-06 | End: 2025-03-08 | Stop reason: HOSPADM

## 2025-03-06 RX ORDER — ACETAMINOPHEN 650 MG/1
650 SUPPOSITORY RECTAL EVERY 4 HOURS PRN
Status: DISCONTINUED | OUTPATIENT
Start: 2025-03-06 | End: 2025-03-08 | Stop reason: HOSPADM

## 2025-03-06 RX ORDER — ONDANSETRON 2 MG/ML
4 INJECTION INTRAMUSCULAR; INTRAVENOUS EVERY 6 HOURS PRN
Status: DISCONTINUED | OUTPATIENT
Start: 2025-03-06 | End: 2025-03-08 | Stop reason: HOSPADM

## 2025-03-06 RX ORDER — LIDOCAINE 40 MG/G
CREAM TOPICAL
Status: DISCONTINUED | OUTPATIENT
Start: 2025-03-06 | End: 2025-03-08 | Stop reason: HOSPADM

## 2025-03-06 RX ORDER — ATORVASTATIN CALCIUM 10 MG/1
10 TABLET, FILM COATED ORAL DAILY
Status: DISCONTINUED | OUTPATIENT
Start: 2025-03-06 | End: 2025-03-08 | Stop reason: HOSPADM

## 2025-03-06 RX ORDER — PANTOPRAZOLE SODIUM 40 MG/1
40 TABLET, DELAYED RELEASE ORAL
Status: DISCONTINUED | OUTPATIENT
Start: 2025-03-06 | End: 2025-03-06

## 2025-03-06 RX ORDER — ONDANSETRON 4 MG/1
4 TABLET, ORALLY DISINTEGRATING ORAL EVERY 6 HOURS PRN
Status: DISCONTINUED | OUTPATIENT
Start: 2025-03-06 | End: 2025-03-08 | Stop reason: HOSPADM

## 2025-03-06 RX ORDER — ALBUTEROL SULFATE 90 UG/1
2 INHALANT RESPIRATORY (INHALATION) EVERY 4 HOURS PRN
Status: DISCONTINUED | OUTPATIENT
Start: 2025-03-06 | End: 2025-03-08 | Stop reason: HOSPADM

## 2025-03-06 RX ORDER — ASPIRIN 81 MG/1
162 TABLET, CHEWABLE ORAL ONCE
Status: COMPLETED | OUTPATIENT
Start: 2025-03-06 | End: 2025-03-06

## 2025-03-06 RX ORDER — KETOCONAZOLE 20 MG/G
CREAM TOPICAL DAILY PRN
COMMUNITY

## 2025-03-06 RX ORDER — NITROGLYCERIN 0.4 MG/1
0.4 TABLET SUBLINGUAL EVERY 5 MIN PRN
Status: DISCONTINUED | OUTPATIENT
Start: 2025-03-06 | End: 2025-03-06

## 2025-03-06 RX ORDER — CALCIUM CARBONATE 500 MG/1
1000 TABLET, CHEWABLE ORAL 4 TIMES DAILY PRN
Status: DISCONTINUED | OUTPATIENT
Start: 2025-03-06 | End: 2025-03-08 | Stop reason: HOSPADM

## 2025-03-06 RX ORDER — PANTOPRAZOLE SODIUM 40 MG/1
40 TABLET, DELAYED RELEASE ORAL
Status: DISCONTINUED | OUTPATIENT
Start: 2025-03-06 | End: 2025-03-08 | Stop reason: HOSPADM

## 2025-03-06 RX ORDER — DILTIAZEM HYDROCHLORIDE 120 MG/1
120 CAPSULE, COATED, EXTENDED RELEASE ORAL 2 TIMES DAILY
Status: DISCONTINUED | OUTPATIENT
Start: 2025-03-06 | End: 2025-03-07 | Stop reason: ALTCHOICE

## 2025-03-06 RX ORDER — ACETAMINOPHEN 325 MG/1
650 TABLET ORAL EVERY 4 HOURS PRN
Status: DISCONTINUED | OUTPATIENT
Start: 2025-03-06 | End: 2025-03-08 | Stop reason: HOSPADM

## 2025-03-06 RX ORDER — ASPIRIN 81 MG/1
81 TABLET, CHEWABLE ORAL DAILY
Status: DISCONTINUED | OUTPATIENT
Start: 2025-03-07 | End: 2025-03-07

## 2025-03-06 RX ORDER — HEPARIN SODIUM 10000 [USP'U]/100ML
0-5000 INJECTION, SOLUTION INTRAVENOUS CONTINUOUS
Status: DISCONTINUED | OUTPATIENT
Start: 2025-03-06 | End: 2025-03-06

## 2025-03-06 RX ORDER — IOPAMIDOL 755 MG/ML
57 INJECTION, SOLUTION INTRAVASCULAR ONCE
Status: COMPLETED | OUTPATIENT
Start: 2025-03-06 | End: 2025-03-06

## 2025-03-06 RX ORDER — AMOXICILLIN 250 MG
2 CAPSULE ORAL 2 TIMES DAILY PRN
Status: DISCONTINUED | OUTPATIENT
Start: 2025-03-06 | End: 2025-03-08 | Stop reason: HOSPADM

## 2025-03-06 RX ORDER — DILTIAZEM HYDROCHLORIDE 120 MG/1
120 CAPSULE, COATED, EXTENDED RELEASE ORAL ONCE
Status: COMPLETED | OUTPATIENT
Start: 2025-03-06 | End: 2025-03-06

## 2025-03-06 RX ORDER — HEPARIN SODIUM 10000 [USP'U]/100ML
0-5000 INJECTION, SOLUTION INTRAVENOUS CONTINUOUS
Status: DISCONTINUED | OUTPATIENT
Start: 2025-03-06 | End: 2025-03-07

## 2025-03-06 RX ORDER — NITROGLYCERIN 0.4 MG/1
0.4 TABLET SUBLINGUAL EVERY 5 MIN PRN
Status: DISCONTINUED | OUTPATIENT
Start: 2025-03-06 | End: 2025-03-08 | Stop reason: HOSPADM

## 2025-03-06 RX ADMIN — IOPAMIDOL 57 ML: 755 INJECTION, SOLUTION INTRAVENOUS at 03:51

## 2025-03-06 RX ADMIN — ACETAMINOPHEN 650 MG: 325 TABLET, FILM COATED ORAL at 20:03

## 2025-03-06 RX ADMIN — DILTIAZEM HYDROCHLORIDE 120 MG: 120 CAPSULE, COATED, EXTENDED RELEASE ORAL at 20:03

## 2025-03-06 RX ADMIN — PANTOPRAZOLE SODIUM 40 MG: 40 TABLET, DELAYED RELEASE ORAL at 09:56

## 2025-03-06 RX ADMIN — HEPARIN SODIUM 700 UNITS/HR: 10000 INJECTION, SOLUTION INTRAVENOUS at 02:53

## 2025-03-06 RX ADMIN — NITROGLYCERIN 0.4 MG: 0.4 TABLET SUBLINGUAL at 02:40

## 2025-03-06 RX ADMIN — DILTIAZEM HYDROCHLORIDE 120 MG: 120 CAPSULE, COATED, EXTENDED RELEASE ORAL at 11:17

## 2025-03-06 RX ADMIN — SODIUM CHLORIDE 84 ML: 9 INJECTION, SOLUTION INTRAVENOUS at 03:51

## 2025-03-06 RX ADMIN — ASPIRIN 162 MG: 81 TABLET, CHEWABLE ORAL at 00:18

## 2025-03-06 RX ADMIN — DILTIAZEM HYDROCHLORIDE 120 MG: 120 CAPSULE, EXTENDED RELEASE ORAL at 02:38

## 2025-03-06 RX ADMIN — NITROGLYCERIN 10 MCG/MIN: 20 INJECTION INTRAVENOUS at 15:10

## 2025-03-06 RX ADMIN — ATORVASTATIN CALCIUM 10 MG: 10 TABLET, FILM COATED ORAL at 11:17

## 2025-03-06 RX ADMIN — PANTOPRAZOLE SODIUM 40 MG: 40 TABLET, DELAYED RELEASE ORAL at 02:38

## 2025-03-06 ASSESSMENT — ACTIVITIES OF DAILY LIVING (ADL)
ADLS_ACUITY_SCORE: 48
ADLS_ACUITY_SCORE: 49
ADLS_ACUITY_SCORE: 48
ADLS_ACUITY_SCORE: 49
ADLS_ACUITY_SCORE: 48
ADLS_ACUITY_SCORE: 49
ADLS_ACUITY_SCORE: 48
ADLS_ACUITY_SCORE: 49
ADLS_ACUITY_SCORE: 48
ADLS_ACUITY_SCORE: 49
ADLS_ACUITY_SCORE: 48

## 2025-03-06 NOTE — PROGRESS NOTES
I examined patient in the ER.  She was endorsing some chest pain.  Patient will have left heart cath tomorrow.  Started on nitroglycerin drip.  Cardiology aware.  Reviewed H&P for admission details.

## 2025-03-06 NOTE — PROGRESS NOTES
RECEIVING UNIT ED HANDOFF REVIEW    ED Nurse Handoff Report was reviewed by: Andreia Lema RN on March 6, 2025 at 5:30 PM

## 2025-03-06 NOTE — ED PROVIDER NOTES
Emergency Department Note      History of Present Illness     Chief Complaint  Fall and Headache      HPI  Margareth Hogue is a 79 year old female who presents to the emergency room after a ground-level fall.  She is very clear that she slipped on ice and it is indeed very icy outside and noted that she was walking on a bumpy patch and then accidentally stepped into a sheer patch and fell back and hit her head.  Denies loss of consciousness, does endorse significant nausea, no numbness or tingling, notes that she also hit her bottom.  Does endorse minimal neck pain but no tingling or paresthesias shooting down either side, does state that she has no hip pain or arm pain or extremity pain at this time.  Does take Eliquis for her A-fib.      Independent Historian  No    Review of External Notes  Yes I have reviewed the patient's last ER visit note from 1 - 2 - 25 the patient was seen for non-intractable headache as well as paresthesias.  It does look like she is on Eliquis, CT scan of the head was unremarkable at that time.      Past Medical History   Medical History and Problem List  No past medical history on file.    Medications  albuterol (PROAIR HFA/PROVENTIL HFA/VENTOLIN HFA) 108 (90 Base) MCG/ACT inhaler  atorvastatin (LIPITOR) 10 MG tablet  budesonide (PULMICORT) 0.5 MG/2ML neb solution  calcium carbonate (OS-SHINE) 1500 (600 Ca) MG tablet  cetirizine (ZYRTEC) 10 MG tablet  conjugated estrogens (PREMARIN) 0.625 MG/GM vaginal cream  diltiazem ER (TIAZAC) 120 MG 24 hr ER beaded capsule  docusate sodium (DSS) 100 MG capsule  fish oil-omega-3 fatty acids 1000 MG capsule  fluticasone (FLONASE) 50 MCG/ACT nasal spray  ipratropium (ATROVENT) 0.02 % neb solution  montelukast (SINGULAIR) 10 MG tablet  omeprazole (PRILOSEC) 20 MG DR capsule  polyethylene glycol (MIRALAX) 17 g packet  polyethylene glycol-propylene glycol (SYSTANE ULTRA) 0.4-0.3 % SOLN ophthalmic solution  TRELEGY ELLIPTA 100-62.5-25 MCG/ACT oral  "inhaler        Surgical History   No past surgical history on file.      Physical Exam   Patient Vitals for the past 24 hrs:   BP Temp Temp src Pulse Resp SpO2 Height Weight   03/06/25 0305 106/68 -- -- 90 11 94 % -- --   03/06/25 0300 111/69 -- -- 88 (!) 9 94 % -- --   03/06/25 0255 110/71 -- -- 91 11 94 % -- --   03/06/25 0245 110/78 -- -- 96 14 94 % -- --   03/06/25 0230 100/72 -- -- 92 12 96 % -- --   03/06/25 0215 101/73 -- -- 90 18 96 % -- --   03/06/25 0200 103/70 -- -- 89 15 94 % -- --   03/06/25 0145 104/68 -- -- 92 15 97 % -- --   03/06/25 0142 -- -- -- -- -- -- 1.6 m (5' 3\") 59.9 kg (132 lb)   03/06/25 0130 111/77 -- -- 93 12 93 % -- --   03/06/25 0115 112/81 -- -- 94 14 92 % -- --   03/06/25 0100 114/80 -- -- 95 13 93 % -- --   03/06/25 0045 113/78 -- -- 95 11 94 % -- --   03/06/25 0030 116/85 -- -- 98 15 95 % -- --   03/06/25 0015 (!) 129/94 -- -- (!) 124 15 93 % -- --   03/06/25 0000 (!) 130/97 -- -- (!) 126 12 96 % -- --   03/05/25 2334 (!) 141/107 -- -- 94 10 97 % -- --   03/05/25 2330 (!) 141/107 -- -- (!) 130 12 97 % -- --   03/05/25 2300 (!) 125/93 97.5  F (36.4  C) Oral (!) 136 20 97 % -- --       Physical Exam  Vitals: reviewed by me  General: [facility], cooperative, and alert to conversation  Eyes: Tracking well, clear conjunctiva BL  ENT: MMM, midline trachea.  Minimal paraspinal tenderness to palpation on C-spine.   Lungs: No tachypnea, no accessory muscle use. No respiratory distress.   CV: Rate as above  MSK: no joint effusion.  No evidence of trauma apart from some scattered abrasions.  No tenderness to palpation to bilateral shoulders, elbows, wrists, hips, knees, ankles.  Skin: No rash  Neuro: Clear speech and no facial droop.  Moving all extremity spontaneously and following all commands  Psych: Not RIS, no e/o AH/VH      Diagnostics   Lab Results   Labs Ordered and Resulted from Time of ED Arrival to Time of ED Departure   COMPREHENSIVE METABOLIC PANEL - " Abnormal       Result Value    Sodium 134 (*)     Potassium 4.1      Carbon Dioxide (CO2) 21 (*)     Anion Gap 12      Urea Nitrogen 17.1      Creatinine 0.58      GFR Estimate >90      Calcium 9.3      Chloride 101      Glucose 108 (*)     Alkaline Phosphatase 90      AST 23      ALT 20      Protein Total 6.5      Albumin 3.9      Bilirubin Total 0.5     TROPONIN T, HIGH SENSITIVITY - Abnormal    Troponin T, High Sensitivity 492 (*)    CBC WITH PLATELETS AND DIFFERENTIAL - Abnormal    WBC Count 12.0 (*)     RBC Count 4.21      Hemoglobin 12.1      Hematocrit 36.1      MCV 86      MCH 28.7      MCHC 33.5      RDW 13.5      Platelet Count 346      % Neutrophils 80      % Lymphocytes 12      % Monocytes 6      % Eosinophils 1      % Basophils 0      % Immature Granulocytes 0      NRBCs per 100 WBC 0      Absolute Neutrophils 9.6 (*)     Absolute Lymphocytes 1.5      Absolute Monocytes 0.8      Absolute Eosinophils 0.1      Absolute Basophils 0.0      Absolute Immature Granulocytes 0.1      Absolute NRBCs 0.0     TROPONIN T, HIGH SENSITIVITY - Abnormal    Troponin T, High Sensitivity 648 (*)    NT PROBNP INPATIENT - Normal    N terminal Pro BNP Inpatient 511     PARTIAL THROMBOPLASTIN TIME - Normal    aPTT 32     HEPARIN UNFRACTIONATED ANTI XA LEVEL - Normal    Anti Xa Unfractionated Heparin 0.46     TROPONIN T, HIGH SENSITIVITY   CBC WITH PLATELETS   BASIC METABOLIC PANEL   LIPID PROFILE       Imaging  CT Chest Pulmonary Embolism w Contrast   Final Result   IMPRESSION:   1.  Negative for pulmonary embolism.      2.  Pulmonary interstitial edema with small pleural effusions.      3.  No significant change in size of right upper lobe nodules. There are a few new nodular opacities in the right lower lobe which may be inflammatory though are indeterminant. Consider continued CT surveillance.      CT Cervical Spine w/o Contrast   Final Result   IMPRESSION:   HEAD CT:   1.  No CT evidence for acute intracranial process.    2.  Presumed chronic microvascular ischemic changes as above.   3.  Mild posterior right parietal scalp swelling.   4.  Stable mixed lytic and sclerotic lesion in the posterior right parietal bone is likely benign given its well-circumscribed margins and stability when compared to an older CT dated 07/18/2024. Osseous metastasis is felt to be less likely but not    excluded. If there is a clinical history of malignancy, consider bone scan for further evaluation.      CERVICAL SPINE CT:   1.  No CT evidence for acute fracture or post traumatic subluxation.   2.  Degenerative changes with central canal and foraminal stenosis as described above.         Head CT w/o contrast   Final Result   IMPRESSION:   HEAD CT:   1.  No CT evidence for acute intracranial process.   2.  Presumed chronic microvascular ischemic changes as above.   3.  Mild posterior right parietal scalp swelling.   4.  Stable mixed lytic and sclerotic lesion in the posterior right parietal bone is likely benign given its well-circumscribed margins and stability when compared to an older CT dated 07/18/2024. Osseous metastasis is felt to be less likely but not    excluded. If there is a clinical history of malignancy, consider bone scan for further evaluation.      CERVICAL SPINE CT:   1.  No CT evidence for acute fracture or post traumatic subluxation.   2.  Degenerative changes with central canal and foraminal stenosis as described above.         XR Chest 2 Views   Final Result   IMPRESSION: Heart size and pulmonary vascularity within normal limits. No acute infiltrates or consolidation. Negative for pneumothorax. Calcified hilar and mediastinal lymph nodes compatible prior granulomatous disease. Degenerative changes throughout    spine. Bilateral shoulder arthroplasties. No definite acute findings.       Echocardiogram Complete    (Results Pending)       EKG   ECG results from 03/05/25   EKG 12-lead, tracing only     Value    Systolic Blood Pressure      Diastolic Blood Pressure     Ventricular Rate 130    Atrial Rate 131    LA Interval 136    QRS Duration 146        QTc 523    P Axis 84    R AXIS -72    T Axis 33    Interpretation ECG      Sinus tachycardia with Fusion complexes  Right bundle branch block  Left anterior fascicular block  ** Bifascicular block **  Septal infarct , age undetermined  Abnormal ECG  When compared with ECG of 02-Jan-2025 10:43,  Fusion complexes are now Present  Vent. rate has increased by  60 bpm  (RBBB and left anterior fascicular block) is now Present  Reviewed by Andres FIGUEROA                                                                                   Independent Interpretation  Yes I have independently reviewed the patient's CT scan of the head, no obvious hemorrhage noted.      ED Course      Medications Administered   Medications   pantoprazole (PROTONIX) EC tablet 40 mg (40 mg Oral $Given 3/6/25 9462)   heparin 25,000 units in 0.45% NaCl 250 mL ANTICOAGULANT infusion (700 Units/hr Intravenous $New Bag 3/6/25 0673)   aspirin (ASA) chewable tablet 81 mg (has no administration in time range)   HOLD: nitroGLYcerin IF (has no administration in time range)   nitroGLYcerin (NITROSTAT) sublingual tablet 0.4 mg (has no administration in time range)   senna-docusate (SENOKOT-S/PERICOLACE) 8.6-50 MG per tablet 1 tablet (has no administration in time range)     Or   senna-docusate (SENOKOT-S/PERICOLACE) 8.6-50 MG per tablet 2 tablet (has no administration in time range)   calcium carbonate (TUMS) chewable tablet 1,000 mg (has no administration in time range)   acetaminophen (TYLENOL) tablet 650 mg (has no administration in time range)     Or   acetaminophen (TYLENOL) Suppository 650 mg (has no administration in time range)   ondansetron (ZOFRAN ODT) ODT tab 4 mg (has no administration in time range)     Or   ondansetron (ZOFRAN) injection 4 mg (has no administration in time range)   albuterol (PROVENTIL HFA/VENTOLIN HFA)  inhaler (has no administration in time range)   acetaminophen (TYLENOL) tablet 1,000 mg (1,000 mg Oral $Given 3/5/25 9374)   aspirin (ASA) chewable tablet 162 mg (162 mg Oral $Given 3/6/25 0018)   diltiazem ER COATED BEADS (CARDIZEM CD/CARTIA XT) 24 hr capsule 120 mg (120 mg Oral $Given 3/6/25 0238)   iopamidol (ISOVUE-370) solution 57 mL (57 mLs Intravenous $Given 3/6/25 0351)   sodium chloride 0.9 % bag 500mL for CT scan flush use (84 mLs Intravenous $Given 3/6/25 0351)          I spoke with cardiology is Dr. Lindsey who recommended starting heparin as it is a CT scan of the head was formally read as negative.      Optional/Additional Documentation  None      Medical Decision Making / Diagnosis           MIPS   CT for PE was ordered because the patient is high risk for pulmonary embolism.        MDM  This is a pleasant 79-year-old female who presents to the emergency room with appears to be a ground-level fall.  Her trauma workup is essentially negative and has no evidence of an intracranial issue however, her tachycardia and chest pressure did become apparent while she was waiting here in the ER and we added on a troponin for this.  Her troponin was significantly elevated and continued to climb, and after the CT scan of the head was read as no bleed, even though she is on a blood thinner we did add heparinization as this did appear to be consistent with cardiac damage that was possibly ongoing.  Subsequent EKGs continue to simply show a right bundle branch block, and patient's pain has improved significantly over time here.  I did discuss the case with cardiology and the admitting hospitalist team and we all agreed that the patient should come into the hospital, we will continue heparinization, and also monitor very carefully.  Have given her home medications including diltiazem, and she will be admitted to the next available inpatient bed.  No indication for antibiotics, no other trauma noted to her person, not  requiring IV medication for her rate as it otherwise seems to be controlled for the most part.    ICD-10 Codes:    ICD-10-CM    1. NSTEMI (non-ST elevated myocardial infarction) (H)  I21.4       2. Fall, initial encounter  W19.XXXA       3. Atrial fibrillation with RVR (H)  I48.91                           Darryl Campos MD  03/06/25 0550

## 2025-03-06 NOTE — CONSULTS
[facility]    Cardiology Consultation     Date of Admission:  3/5/2025    Assessment & Plan   Margareth Hogue is a 79 year old female who was admitted on 3/5/2025.      Chest discomfort, pressure-like sensation with rising troponin.  Overall this is concerning for non-ST elevation myocardial infarction.  Discussed option of medical management versus medical management plus coronary angiogram.  Risk benefits of coronary angiogram were discussed in detail with patient with risks including but not limited risk of stroke, MI, death, need for PRBC transfusion, emergent bypass surgery.  Patient understands the rational of coronary angiogram the risk and all and the alternative and wishes to proceed with it.  She had apixaban yesterday definitely in the morning possibly also in the evening.  Recommend coronary angiogram tomorrow.  N.p.o. since midnight.  She is hemodynamically stable at this time and symptom-free.  Agree with echocardiogram.  Continue aspirin, heparin, statin, diltiazem.  Admitted with fall, could be mechanical fall.  History is not clearly reliable.  A syncope or presyncope cannot be completely excluded.  EKG shows new bifascicular block.  So far no significant bradycardia arrhythmia on telemetry.  Recommend continue telemetry and likely discharge on event monitor if no concerning arrhythmia noted while inpatient.  Paroxysmal atrial fibrillation has failed rhythm control strategy, (history of PVI, antiarrhythmic therapy with patient having breakthrough episodes while being on antiarrhythmic therapy which was subsequently discontinued) and has been on rate control strategy with diltiazem.  On apixaban 2.5 mg twice daily adjusted for age and body weight.  History of renal cell carcinoma status post right nephrectomy.  Normal creatinine    Recommendations  Continue aspirin, heparin, statin, diltiazem  Agree with echocardiogram  Coronary angiogram with possible revascularization.  As  patient had apixaban yesterday recommend angiogram tomorrow.  N.p.o. since midnight  Continue telemetry monitoring          Payam Munroe MD, MD    Primary Care Physician   Nadira Rob    Reason for Consult   Reason for consult: I was asked to evaluate this patient for non-STEMI.    History of Present Illness   Margareth Hogue is a 79 year old female who presents with fall.  It appears to be possibly mechanical fall although patient is not sure whether she was conscious at that time or not.  Subsequently while in the ER patient noted chest discomfort pressure-like sensation a few times.  High sensitive troponin leda up to mid 600 now downtrending.  CT chest showed moderate coronary calcification.  She has history of paroxysmal atrial fibrillation being followed by Dr Mcmillan and Dr. Louis and has failed rhythm control strategy and is on diltiazem and apixaban 2.5 mg twice daily dose adjusted for body weight and age.  She definitely had apixaban yesterday morning and possibly evening too.  EKG shows sinus rhythm, paroxysmal atrial fibrillation with bifascicular block.  Compared to previous EKG bifascicular block appears new.  She denies any dizziness presyncope syncope.  She has history of renal cell carcinoma status post nephrectomy.  Creatinine is normal.  No bleeding issues while being on apixaban.  No anticipated surgery.    Past Medical History   No past medical history on file.      Past Surgical History   No past surgical history on file.      Prior to Admission Medications   Prior to Admission Medications   Prescriptions Last Dose Informant Patient Reported? Taking?   TRELEGY ELLIPTA 100-62.5-25 MCG/ACT oral inhaler   Yes No   Sig: Inhale 2 puffs into the lungs daily   albuterol (PROAIR HFA/PROVENTIL HFA/VENTOLIN HFA) 108 (90 Base) MCG/ACT inhaler   Yes No   Sig: Inhale 2 puffs into the lungs every 4 hours as needed   atorvastatin (LIPITOR) 10 MG tablet   No No   Sig: Take 1 tablet (10 mg) by mouth  daily.   budesonide (PULMICORT) 0.5 MG/2ML neb solution   Yes No   Sig: Take 0.5 mg by nebulization daily as needed.   calcium carbonate (OS-SHINE) 1500 (600 Ca) MG tablet   Yes No   Sig: Take 600 mg by mouth daily.   cetirizine (ZYRTEC) 10 MG tablet   Yes No   Sig: Take 10 mg by mouth every evening.   conjugated estrogens (PREMARIN) 0.625 MG/GM vaginal cream   Yes No   Sig: Place vaginally daily as needed.   diltiazem ER (TIAZAC) 120 MG 24 hr ER beaded capsule   No No   Sig: Take 1 capsule (120 mg) by mouth 2 times daily.   docusate sodium (DSS) 100 MG capsule   Yes No   Sig: Take 1 capsule by mouth every evening.   fish oil-omega-3 fatty acids 1000 MG capsule   Yes No   Sig: Take 1 g by mouth 2 times daily.   fluticasone (FLONASE) 50 MCG/ACT nasal spray   Yes No   Sig: Spray 1 spray into both nostrils daily as needed for rhinitis or allergies.   ipratropium (ATROVENT) 0.02 % neb solution   Yes No   Sig: Inhale 0.5 mg into the lungs every 6 hours as needed for wheezing.   montelukast (SINGULAIR) 10 MG tablet   Yes No   Sig: Take 1 tablet by mouth At Bedtime   Patient not taking: Reported on 9/23/2024   omeprazole (PRILOSEC) 20 MG DR capsule   Yes No   Sig: Take 20 mg by mouth daily as needed.   polyethylene glycol (MIRALAX) 17 g packet   Yes No   Sig: Take 1 packet by mouth daily as needed for constipation.   polyethylene glycol-propylene glycol (SYSTANE ULTRA) 0.4-0.3 % SOLN ophthalmic solution   Yes No   Sig: Place 1-2 drops into both eyes 4 times daily as needed for dry eyes.      Facility-Administered Medications: None     Current Facility-Administered Medications   Medication Dose Route Frequency Provider Last Rate Last Admin    acetaminophen (TYLENOL) tablet 650 mg  650 mg Oral Q4H PRN Kanchan Leonard MD        Or    acetaminophen (TYLENOL) Suppository 650 mg  650 mg Rectal Q4H PRN Kanchan Leonard MD        albuterol (PROVENTIL HFA/VENTOLIN HFA) inhaler  2 puff Inhalation Q4H PRN Kanchan Leonard  MD Patito        [START ON 3/7/2025] aspirin (ASA) chewable tablet 81 mg  81 mg Oral Daily Kanchan Leonard MD        atorvastatin (LIPITOR) tablet 10 mg  10 mg Oral Daily Kanchan Leonard MD        calcium carbonate (TUMS) chewable tablet 1,000 mg  1,000 mg Oral 4x Daily PRN Kanchan Leonard MD        diltiazem ER (TIAZAC) CP24 120 mg  120 mg Oral BID Kanchan Leonard MD        heparin 25,000 units in 0.45% NaCl 250 mL ANTICOAGULANT infusion  0-5,000 Units/hr Intravenous Continuous Kanchan Leonard MD 7 mL/hr at 03/06/25 0823 700 Units/hr at 03/06/25 0823    HOLD: nitroGLYcerin IF   Does not apply HOLD Kanchan Leonard MD        lidocaine (LMX4) cream   Topical Q1H PRN Kanchan Leonard MD        lidocaine 1 % 0.1-1 mL  0.1-1 mL Other Q1H PRN Kanchan Leonard MD        medication instruction   Does not apply Continuous PRN Kanchan Leonard MD        nitroGLYcerin (NITROSTAT) sublingual tablet 0.4 mg  0.4 mg Sublingual Q5 Min PRN Kanchan Leonard MD        ondansetron (ZOFRAN ODT) ODT tab 4 mg  4 mg Oral Q6H PRN Kanchan Leonard MD        Or    ondansetron (ZOFRAN) injection 4 mg  4 mg Intravenous Q6H PRN Kanchan Leonard MD        pantoprazole (PROTONIX) EC tablet 40 mg  40 mg Oral QAM AC Kanchan Leonard MD   40 mg at 03/06/25 0238    Patient is already receiving anticoagulation with heparin, enoxaparin (LOVENOX), warfarin (COUMADIN)  or other anticoagulant medication   Does not apply Continuous PRN Kanchan Leonard MD        Reason ACE/ARB/ARNI order not selected   Other DOES NOT GO TO Kanchan Ramos MD        Reason beta blocker not prescribed   Does not apply DOES NOT GO TO Kanchan Ramos MD        Reason statin not prescribed   Oral DOES NOT GO TO Kanchan Ramos MD        senna-docusate (SENOKOT-S/PERICOLACE) 8.6-50 MG per tablet 1 tablet  1 tablet Oral BID PRN Kanchan Leonard MD        Or     senna-docusate (SENOKOT-S/PERICOLACE) 8.6-50 MG per tablet 2 tablet  2 tablet Oral BID PRN Kanchan Leonard MD        sodium chloride (PF) 0.9% PF flush 3 mL  3 mL Intracatheter Q8H Kanchan Leonard MD        sodium chloride (PF) 0.9% PF flush 3 mL  3 mL Intracatheter q1 min prn Kanchan Leonard MD         Current Outpatient Medications   Medication Sig Dispense Refill    albuterol (PROAIR HFA/PROVENTIL HFA/VENTOLIN HFA) 108 (90 Base) MCG/ACT inhaler Inhale 2 puffs into the lungs every 4 hours as needed      atorvastatin (LIPITOR) 10 MG tablet Take 1 tablet (10 mg) by mouth daily. 90 tablet 3    budesonide (PULMICORT) 0.5 MG/2ML neb solution Take 0.5 mg by nebulization daily as needed.      calcium carbonate (OS-SHINE) 1500 (600 Ca) MG tablet Take 600 mg by mouth daily.      cetirizine (ZYRTEC) 10 MG tablet Take 10 mg by mouth every evening.      conjugated estrogens (PREMARIN) 0.625 MG/GM vaginal cream Place vaginally daily as needed.      diltiazem ER (TIAZAC) 120 MG 24 hr ER beaded capsule Take 1 capsule (120 mg) by mouth 2 times daily. 180 capsule 3    docusate sodium (DSS) 100 MG capsule Take 1 capsule by mouth every evening.      fish oil-omega-3 fatty acids 1000 MG capsule Take 1 g by mouth 2 times daily.      fluticasone (FLONASE) 50 MCG/ACT nasal spray Spray 1 spray into both nostrils daily as needed for rhinitis or allergies.      ipratropium (ATROVENT) 0.02 % neb solution Inhale 0.5 mg into the lungs every 6 hours as needed for wheezing.      montelukast (SINGULAIR) 10 MG tablet Take 1 tablet by mouth At Bedtime (Patient not taking: Reported on 9/23/2024)      omeprazole (PRILOSEC) 20 MG DR capsule Take 20 mg by mouth daily as needed.      polyethylene glycol (MIRALAX) 17 g packet Take 1 packet by mouth daily as needed for constipation.      polyethylene glycol-propylene glycol (SYSTANE ULTRA) 0.4-0.3 % SOLN ophthalmic solution Place 1-2 drops into both eyes 4 times daily as needed for dry  eyes.      TRELEGY ELLIPTA 100-62.5-25 MCG/ACT oral inhaler Inhale 2 puffs into the lungs daily       Current Facility-Administered Medications   Medication Dose Route Frequency Provider Last Rate Last Admin    acetaminophen (TYLENOL) tablet 650 mg  650 mg Oral Q4H PRN Kanchan Leonard MD        Or    acetaminophen (TYLENOL) Suppository 650 mg  650 mg Rectal Q4H PRN Kanchan Leonard MD        albuterol (PROVENTIL HFA/VENTOLIN HFA) inhaler  2 puff Inhalation Q4H PRN Kanchan Leonard MD        [START ON 3/7/2025] aspirin (ASA) chewable tablet 81 mg  81 mg Oral Daily Kanchan Leonard MD        atorvastatin (LIPITOR) tablet 10 mg  10 mg Oral Daily Kanchan Leonard MD        calcium carbonate (TUMS) chewable tablet 1,000 mg  1,000 mg Oral 4x Daily PRN Kanchan Leonard MD        diltiazem ER (TIAZAC) CP24 120 mg  120 mg Oral BID Kanchan Leonard MD        heparin 25,000 units in 0.45% NaCl 250 mL ANTICOAGULANT infusion  0-5,000 Units/hr Intravenous Continuous Kanchan Leonard MD 7 mL/hr at 03/06/25 0823 700 Units/hr at 03/06/25 0823    HOLD: nitroGLYcerin IF   Does not apply HOLD Kanchan Leonard MD        lidocaine (LMX4) cream   Topical Q1H PRN Kanchan Leonard MD        lidocaine 1 % 0.1-1 mL  0.1-1 mL Other Q1H PRN Kanchan Leonard MD        medication instruction   Does not apply Continuous PRN Kanchan Leonard MD        nitroGLYcerin (NITROSTAT) sublingual tablet 0.4 mg  0.4 mg Sublingual Q5 Min PRN Kanchan Leonard MD        ondansetron (ZOFRAN ODT) ODT tab 4 mg  4 mg Oral Q6H PRN Kanchan Leonard MD        Or    ondansetron (ZOFRAN) injection 4 mg  4 mg Intravenous Q6H PRN Kanchan Leonard MD        pantoprazole (PROTONIX) EC tablet 40 mg  40 mg Oral QAM AC Kanchan Leonard MD   40 mg at 03/06/25 0238    Patient is already receiving anticoagulation with heparin, enoxaparin (LOVENOX), warfarin (COUMADIN)  or other anticoagulant  medication   Does not apply Continuous PRN Kanchan Leonard MD        Reason ACE/ARB/ARNI order not selected   Other DOES NOT GO TO Kanchan Ramos MD        Reason beta blocker not prescribed   Does not apply DOES NOT GO TO Kanchan Ramos MD        Reason statin not prescribed   Oral DOES NOT GO TO Kanchan Ramos MD        senna-docusate (SENOKOT-S/PERICOLACE) 8.6-50 MG per tablet 1 tablet  1 tablet Oral BID PRN Kanchan Leonard MD        Or    senna-docusate (SENOKOT-S/PERICOLACE) 8.6-50 MG per tablet 2 tablet  2 tablet Oral BID PRN Kanchan Leonard MD        sodium chloride (PF) 0.9% PF flush 3 mL  3 mL Intracatheter Q8H Kanchan Leonard MD        sodium chloride (PF) 0.9% PF flush 3 mL  3 mL Intracatheter q1 min prn Kanchan Leonard MD         Current Outpatient Medications   Medication Sig Dispense Refill    albuterol (PROAIR HFA/PROVENTIL HFA/VENTOLIN HFA) 108 (90 Base) MCG/ACT inhaler Inhale 2 puffs into the lungs every 4 hours as needed      atorvastatin (LIPITOR) 10 MG tablet Take 1 tablet (10 mg) by mouth daily. 90 tablet 3    budesonide (PULMICORT) 0.5 MG/2ML neb solution Take 0.5 mg by nebulization daily as needed.      calcium carbonate (OS-SHINE) 1500 (600 Ca) MG tablet Take 600 mg by mouth daily.      cetirizine (ZYRTEC) 10 MG tablet Take 10 mg by mouth every evening.      conjugated estrogens (PREMARIN) 0.625 MG/GM vaginal cream Place vaginally daily as needed.      diltiazem ER (TIAZAC) 120 MG 24 hr ER beaded capsule Take 1 capsule (120 mg) by mouth 2 times daily. 180 capsule 3    docusate sodium (DSS) 100 MG capsule Take 1 capsule by mouth every evening.      fish oil-omega-3 fatty acids 1000 MG capsule Take 1 g by mouth 2 times daily.      fluticasone (FLONASE) 50 MCG/ACT nasal spray Spray 1 spray into both nostrils daily as needed for rhinitis or allergies.      ipratropium (ATROVENT) 0.02 % neb solution Inhale 0.5 mg into the lungs every 6  "hours as needed for wheezing.      montelukast (SINGULAIR) 10 MG tablet Take 1 tablet by mouth At Bedtime (Patient not taking: Reported on 9/23/2024)      omeprazole (PRILOSEC) 20 MG DR capsule Take 20 mg by mouth daily as needed.      polyethylene glycol (MIRALAX) 17 g packet Take 1 packet by mouth daily as needed for constipation.      polyethylene glycol-propylene glycol (SYSTANE ULTRA) 0.4-0.3 % SOLN ophthalmic solution Place 1-2 drops into both eyes 4 times daily as needed for dry eyes.      TRELEGY ELLIPTA 100-62.5-25 MCG/ACT oral inhaler Inhale 2 puffs into the lungs daily       Allergies   Allergies   Allergen Reactions    Sulfa Antibiotics Unknown     Long time ago, she cannot remember    Diazepam Other (See Comments)     Makes more anxious    Meperidine Nausea and Vomiting and Unknown    Morphine Nausea and Unknown    Penicillins Itching    Zafirlukast      She does not remember       Social History    reports that she has never smoked. She has never used smokeless tobacco.      Family History   I have reviewed this patient's family history and updated it with pertinent information if needed.  Family History   Problem Relation Age of Onset    Heart Defect Mother     Myocardial Infarction Father           Review of Systems   A comprehensive review of system was performed and is negative other than that noted in the HPI or here.     Physical Exam   Vital Signs with Ranges  Temp:  [97.5  F (36.4  C)] 97.5  F (36.4  C)  Pulse:  [] 80  Resp:  [9-20] 19  BP: ()/() 99/62  SpO2:  [92 %-97 %] 95 %  Wt Readings from Last 4 Encounters:   03/06/25 59.9 kg (132 lb)   12/30/24 59 kg (130 lb)   09/23/24 59.9 kg (132 lb)   07/18/24 59 kg (130 lb)     No intake/output data recorded.      Vitals: BP 99/62   Pulse 80   Temp 97.5  F (36.4  C) (Oral)   Resp 19   Ht 1.6 m (5' 3\")   Wt 59.9 kg (132 lb)   LMP  (LMP Unknown)   SpO2 95%   BMI 23.38 kg/m      Physical Exam:   General Patient appears " "comfortable  Neck normal JVP  Cardiovascular system S1-S2 normal no murmur rub or gallop  Respiratory system clear to auscultation  Extremities no edema    No lab results found in last 7 days.    Invalid input(s): \"TROPONINIES\"    Recent Labs   Lab 03/06/25  0618 03/05/25  2323   WBC 9.3 12.0*   HGB 11.0* 12.1   MCV 85 86    346   * 134*   POTASSIUM 4.0 4.1   CHLORIDE 99 101   CO2 21* 21*   BUN 15.1 17.1   CR 0.60 0.58   GFRESTIMATED 90 >90   ANIONGAP 10 12   SHINE 8.4* 9.3   GLC 91 108*   ALBUMIN  --  3.9   PROTTOTAL  --  6.5   BILITOTAL  --  0.5   ALKPHOS  --  90   ALT  --  20   AST  --  23     Recent Labs   Lab Test 12/30/24  1427 01/19/24  1021   CHOL 209*  --    HDL 82  --    *  --    TRIG 82 50     Recent Labs   Lab 03/06/25  0618 03/05/25  2323   WBC 9.3 12.0*   HGB 11.0* 12.1   HCT 32.5* 36.1   MCV 85 86    346     No results for input(s): \"PH\", \"PHV\", \"PO2\", \"PO2V\", \"SAT\", \"PCO2\", \"PCO2V\", \"HCO3\", \"HCO3V\" in the last 168 hours.  Recent Labs   Lab 03/06/25  0130   NTBNPI 511     No results for input(s): \"DD\" in the last 168 hours.  No results for input(s): \"SED\", \"CRP\" in the last 168 hours.  Recent Labs   Lab 03/06/25  0618 03/05/25  2323    346     No results for input(s): \"TSH\" in the last 168 hours.  No results for input(s): \"COLOR\", \"APPEARANCE\", \"URINEGLC\", \"URINEBILI\", \"URINEKETONE\", \"SG\", \"UBLD\", \"URINEPH\", \"PROTEIN\", \"UROBILINOGEN\", \"NITRITE\", \"LEUKEST\", \"RBCU\", \"WBCU\" in the last 168 hours.    Imaging:  Recent Results (from the past 48 hours)   XR Chest 2 Views    Narrative    EXAM: XR CHEST 2 VIEWS  LOCATION: [facility]  DATE: 3/5/2025    INDICATION: Chest pressure after a fall.  COMPARISON: 11/11/2024.      Impression    IMPRESSION: Heart size and pulmonary vascularity within normal limits. No acute infiltrates or consolidation. Negative for pneumothorax. Calcified hilar and mediastinal lymph nodes compatible prior granulomatous disease. " Degenerative changes throughout   spine. Bilateral shoulder arthroplasties. No definite acute findings.    Head CT w/o contrast    Narrative    EXAM: CT HEAD W/O CONTRAST, CT CERVICAL SPINE W/O CONTRAST  LOCATION: [facility]  DATE: 3/5/2025    INDICATION: trauma  COMPARISON: Dated 01/02/2025  TECHNIQUE:   1) Routine CT Head without IV contrast. Multiplanar reformats. Dose reduction techniques were used.  2) Routine CT Cervical Spine without IV contrast. Multiplanar reformats. Dose reduction techniques were used.    FINDINGS:   HEAD CT:   INTRACRANIAL CONTENTS: No intracranial hemorrhage, extraaxial collection, or mass effect.  No CT evidence of acute infarct. Mild to moderate presumed chronic small vessel ischemic changes. Normal ventricles and sulci.     VISUALIZED ORBITS/SINUSES/MASTOIDS: Prior bilateral cataract surgery. Visualized portions of the orbits are otherwise unremarkable. No paranasal sinus mucosal disease. No middle ear or mastoid effusion.    BONES/SOFT TISSUES: Mild posterior right parietal scalp swelling. No skull fracture. Stable mixed lytic and sclerotic lesion in the posterior right parietal bone with well-circumscribed margins (images 21 and 22 series 4) measures up to 2.0 cm in   diameter. Stable mottled well-circumscribed lytic lesion in the anterior right parietal bone with well-circumscribed margins (image 25 series 4) measures up to 1.4 cm in diameter and is compatible with a benign hemangioma    CERVICAL SPINE CT:   VERTEBRA: Head rotated to the left. Very mild left lateral bending of the neck is likely positional. Straightening of the cervical lordosis. Minimal degenerative anterolisthesis of C3 on C4, slight degenerative retrolisthesis of C6 on C7, and slight   degenerative anterolisthesis of C7 on T1. Otherwise normal vertebral alignment. Normal vertebral body heights. No fracture or posttraumatic subluxation. Solid bony intervertebral fusion at C5-C6. Solid  bony fusion of the bilateral C2-C4 and bilateral   C5-C6 facet joints. Marked degenerative arthritis atlantoaxial joint and mild degenerative arthritis and atlantooccipital joint. Marked degenerative disc disease C4-C5 and C6-C7, moderate degenerative disc disease C3-C4, and mild degenerative disc   disease C7-T1.    CANAL/FORAMINA: Moderate central canal stenosis at C5-C6 and C6-C7 and mild central canal stenosis at C4-C5 due to posterior disc/osteophyte complexes. Marked left and mild right C3-C4, marked right and moderate to marked left C4-C5, moderate and mild   left C5-C6, marked right and moderate to marked left C6-C7, and moderate right and mild left C7-T1 foraminal narrowing due to facet hypertrophic changes and uncovertebral osteophytes.    PARASPINAL: No extraspinal abnormality. Mild biapical lung scarring      Impression    IMPRESSION:  HEAD CT:  1.  No CT evidence for acute intracranial process.  2.  Presumed chronic microvascular ischemic changes as above.  3.  Mild posterior right parietal scalp swelling.  4.  Stable mixed lytic and sclerotic lesion in the posterior right parietal bone is likely benign given its well-circumscribed margins and stability when compared to an older CT dated 07/18/2024. Osseous metastasis is felt to be less likely but not   excluded. If there is a clinical history of malignancy, consider bone scan for further evaluation.    CERVICAL SPINE CT:  1.  No CT evidence for acute fracture or post traumatic subluxation.  2.  Degenerative changes with central canal and foraminal stenosis as described above.     CT Cervical Spine w/o Contrast    Narrative    EXAM: CT HEAD W/O CONTRAST, CT CERVICAL SPINE W/O CONTRAST  LOCATION: [facility]  DATE: 3/5/2025    INDICATION: trauma  COMPARISON: Dated 01/02/2025  TECHNIQUE:   1) Routine CT Head without IV contrast. Multiplanar reformats. Dose reduction techniques were used.  2) Routine CT Cervical Spine without IV  contrast. Multiplanar reformats. Dose reduction techniques were used.    FINDINGS:   HEAD CT:   INTRACRANIAL CONTENTS: No intracranial hemorrhage, extraaxial collection, or mass effect.  No CT evidence of acute infarct. Mild to moderate presumed chronic small vessel ischemic changes. Normal ventricles and sulci.     VISUALIZED ORBITS/SINUSES/MASTOIDS: Prior bilateral cataract surgery. Visualized portions of the orbits are otherwise unremarkable. No paranasal sinus mucosal disease. No middle ear or mastoid effusion.    BONES/SOFT TISSUES: Mild posterior right parietal scalp swelling. No skull fracture. Stable mixed lytic and sclerotic lesion in the posterior right parietal bone with well-circumscribed margins (images 21 and 22 series 4) measures up to 2.0 cm in   diameter. Stable mottled well-circumscribed lytic lesion in the anterior right parietal bone with well-circumscribed margins (image 25 series 4) measures up to 1.4 cm in diameter and is compatible with a benign hemangioma    CERVICAL SPINE CT:   VERTEBRA: Head rotated to the left. Very mild left lateral bending of the neck is likely positional. Straightening of the cervical lordosis. Minimal degenerative anterolisthesis of C3 on C4, slight degenerative retrolisthesis of C6 on C7, and slight   degenerative anterolisthesis of C7 on T1. Otherwise normal vertebral alignment. Normal vertebral body heights. No fracture or posttraumatic subluxation. Solid bony intervertebral fusion at C5-C6. Solid bony fusion of the bilateral C2-C4 and bilateral   C5-C6 facet joints. Marked degenerative arthritis atlantoaxial joint and mild degenerative arthritis and atlantooccipital joint. Marked degenerative disc disease C4-C5 and C6-C7, moderate degenerative disc disease C3-C4, and mild degenerative disc   disease C7-T1.    CANAL/FORAMINA: Moderate central canal stenosis at C5-C6 and C6-C7 and mild central canal stenosis at C4-C5 due to posterior disc/osteophyte complexes.  Marked left and mild right C3-C4, marked right and moderate to marked left C4-C5, moderate and mild   left C5-C6, marked right and moderate to marked left C6-C7, and moderate right and mild left C7-T1 foraminal narrowing due to facet hypertrophic changes and uncovertebral osteophytes.    PARASPINAL: No extraspinal abnormality. Mild biapical lung scarring      Impression    IMPRESSION:  HEAD CT:  1.  No CT evidence for acute intracranial process.  2.  Presumed chronic microvascular ischemic changes as above.  3.  Mild posterior right parietal scalp swelling.  4.  Stable mixed lytic and sclerotic lesion in the posterior right parietal bone is likely benign given its well-circumscribed margins and stability when compared to an older CT dated 07/18/2024. Osseous metastasis is felt to be less likely but not   excluded. If there is a clinical history of malignancy, consider bone scan for further evaluation.    CERVICAL SPINE CT:  1.  No CT evidence for acute fracture or post traumatic subluxation.  2.  Degenerative changes with central canal and foraminal stenosis as described above.     CT Chest Pulmonary Embolism w Contrast    Narrative    EXAM: CT CHEST PULMONARY EMBOLISM W CONTRAST  LOCATION: [facility]  DATE: 3/6/2025    INDICATION: CP + SOB  COMPARISON: Chest radiographs from 3/5/2025, chest CT from 10/23/2024.  TECHNIQUE: CT chest pulmonary angiogram during arterial phase injection of IV contrast. Multiplanar reformats and MIP reconstructions were performed. Dose reduction techniques were used.   CONTRAST: 57 mL Isovue 370    FINDINGS:  ANGIOGRAM CHEST: Pulmonary arteries are normal caliber and negative for pulmonary emboli. Thoracic aorta is not well opacified and is  indeterminate for dissection. No CT evidence of right heart strain.    LUNGS AND PLEURA: Bilateral interstitial opacities with small pleural effusions. There are a few nodular opacities again noted in the right upper lobe  which appears stable. For instance, a 5 mm nodule on image 98 of series 7 and the right upper lobe, a 5   mm right upper lobe nodule on image 118, and a 5 mm right upper lobe nodule on image 121. There are some peripheral nodular opacities in the right lower lobe which are new as seen on axial image 207 measuring up to 4 mm individually.    MEDIASTINUM/AXILLAE: Heart size within normal limits. No pericardial effusion. Numerous calcified mediastinal and hilar lymph nodes.    CORONARY ARTERY CALCIFICATION: Moderate.    UPPER ABDOMEN: No acute finding.    MUSCULOSKELETAL: Osteopenia with thoracic spine degenerative disc change.      Impression    IMPRESSION:  1.  Negative for pulmonary embolism.    2.  Pulmonary interstitial edema with small pleural effusions.    3.  No significant change in size of right upper lobe nodules. There are a few new nodular opacities in the right lower lobe which may be inflammatory though are indeterminant. Consider continued CT surveillance.       Echo:  No results found for this or any previous visit (from the past 4320 hours).    Clinically Significant Risk Factors Present on Admission         # Hyponatremia: Lowest Na = 130 mmol/L in last 2 days, will monitor as appropriate   # Hypocalcemia: Lowest Ca = 8.4 mg/dL in last 2 days, will monitor and replace as appropriate                       # Asthma: noted on problem list

## 2025-03-06 NOTE — ED TRIAGE NOTES
Pt BIBA from home. Pt fell hit her head while walking to move her car. Hit her head on the ground outside. Pt couldn't get up off the floor, crawled to car and got up. This happened around 1930 tonight.

## 2025-03-06 NOTE — ED NOTES
"[facility]  ED Nurse Handoff Report    ED Chief complaint: Fall and Headache      ED Diagnosis:   Final diagnoses:   NSTEMI (non-ST elevated myocardial infarction) (H)   Fall, initial encounter   Atrial fibrillation with RVR (H)       Code Status: To be addressed with admitting provider    Allergies:   Allergies   Allergen Reactions    Sulfa Antibiotics Unknown     Long time ago, she cannot remember    Diazepam Other (See Comments)     Makes more anxious    Meperidine Nausea and Vomiting and Unknown    Morphine Nausea and Unknown    Penicillins Itching    Zafirlukast      She does not remember       Patient Story: Pt came in after falling on snow/ice while attempting to move her car. Patient Fell and hit her head. Pt denies loss of consciousness but reports she \"doesn't fully know how she ended up on the ground\". She reports some lightheadedness and dizziness while ambulating in her apartment after fall. Pt is on Eliquis for A.fib  Focused Assessment:  Pt alert and oriented, does endorse dizziness while ambulating. Developed chest pain while in the ED. Pt weak with ambulation. VSS.     Treatments and/or interventions provided: EKG, Chest xray, CT, Labs, Nitroglycerine given, heparin gtt initiated, ASA given.  Patient's response to treatments and/or interventions: Chest pain improved after nitroglycerine.     To be done/followed up on inpatient unit:  Monitor chest pain and neurological status post fall    Does this patient have any cognitive concerns?:  None    Activity level - Baseline/Home:  Walker  Activity Level - Current:   Stand with Assist walker    Patient's Preferred language: English   Needed?: No    Isolation: None  Infection: Not Applicable  Patient tested for COVID 19 prior to admission: NO  Bariatric?: Yes    Vital Signs:   Vitals:    03/06/25 0245 03/06/25 0255 03/06/25 0300 03/06/25 0305   BP: 110/78 110/71 111/69 106/68   BP Location: Left arm Left arm Left arm Left arm "   Pulse: 96 91 88 90   Resp: 14 11 (!) 9 11   Temp:       TempSrc:       SpO2: 94% 94% 94% 94%   Weight:       Height:           Cardiac Rhythm: NSR    Was the PSS-3 completed:   Yes  What interventions are required if any?               Family Comments:   OBS brochure/video discussed/provided to patient/family: N/A              Name of person given brochure if not patient:               Relationship to patient:     For the majority of the shift this patient's behavior was Green.   Behavioral interventions performed were .    ED NURSE PHONE NUMBER: [phone]

## 2025-03-06 NOTE — H&P
[facility]    History and Physical - Hospitalist Service       Date of Admission:  3/5/2025    Assessment & Plan      Margareth Hogue is a 79 year old female admitted on 3/5/2025.   Margareth Hogue is a 79 year old female with past medical history of hypertension, paroxysmal A-fib on Eliquis, dyslipidemia, TIA, gastroesophageal reflux disease, renal cell carcinoma status post right nephrectomy, CKD stage II and asthma who presented to ER for evaluation after a fall.  She was found to have elevated troponin suggestive of NSTEMI.    # NSTEMI  -The patient presented after she fell on ice last evening.  Initially it was felt that this was a mechanical fall but she does not really remember what happened prior to her fall  -Initially in ER she was tachycardic, EKG showed sinus tachycardia with fusion complexes, right bundle branch block and left anterior fascicular block  -She denies having any chest pain prior to the fall but while she was in ER she complained of some chest pain described as a pressure; chest pain improved with nitroglycerin  -Troponin was 492, repeat troponin 648  -Chest x-ray-no acute pathology  -CT chest-no PE;pulmonary interstitial edema with small pleural effusions   -ER attending discussed with cardiology on-call Dr. Lindsey  -Aspirin 162 mg given in ER and she was started on heparin drip  -Admit inpatient status  -Monitor on telemetry  -Serial troponins until trending down  -Aspirin 81 mg p.o. daily  -Continue heparin drip (of note, PTA she is on Eliquis for history of paroxysmal A-fib: Will hold Eliquis for now)  -Echocardiogram  -Cardiology consult  -N.p.o. for now  -cardiac rehab    # Fall  -Unclear if this was a mechanical fall related to slipping or ice versus cardiac event  -She did hit her head  -CT of the head and CT of the C-spine negative for acute pathology  -Telemetry  -Fall precautions  -PT evaluation    # Hypertension  -PTA meds needs to be verified by the  pharmacy, apparently on diltiazem  mg p.o. twice daily-resumed    # History of paroxysmal A-fib  -PTA on diltiazem ER and Eliquis  -Resume PTA diltiazem  -Hold PTA Eliquis for now, currently she is on heparin drip    # History of dyslipidemia  -Check fasting lipid profile  -Resume PTA Lipitor    # h/o CVA/TIA  -continue Diltiazem, statin    # History of asthma  -Acute issue at this time, no wheezing  -Resume PTA inhalers once verified by the pharmacy    # GERD  -Resume PTA PPI    # History of sarcoidosis, lung  -Noted          Diet:  npo  DVT Prophylaxis: Heparin drip  Curry Catheter: Not present  Lines: None     Cardiac Monitoring: None  Code Status:  Full code, discussed with patient    Clinically Significant Risk Factors Present on Admission         # Hyponatremia: Lowest Na = 134 mmol/L in last 2 days, will monitor as appropriate                         # Asthma: noted on problem list        Disposition Plan     Medically Ready for Discharge: Anticipated in 2-4 Days           Kanchan Leonard MD  Hospitalist Service  [facility]  Securely message with [employer] (more info)  Text page via [facility] Paging/Directory     ______________________________________________________________________    Chief Complaint   Fall, chest pain    History is obtained from the patient is a good historian.  I discussed with ER attending Dr. Campos    History of Present Illness   Margareth Hogue is a 79 year old female with past medical history of hypertension, paroxysmal A-fib on Eliquis, dyslipidemia, TIA, gastroesophageal reflux disease, renal cell carcinoma status post right nephrectomy, CKD stage II and asthma who presented to ER for evaluation after a fall.  Patient states that last evening on 03/05/2025 she went outside to move her car for plowing the snow.  When she was walking on ice she felt and she hit her head.  Initially she thought that she slipped on ice but she does not remember exactly  "what happened.  She thinks that she lost her consciousness for short period of time because of lack of memory.  Next thing that she remembers is after the fall.  She complained of headache and right knee pain.  She was not able to get up by herself, she crawled to her car.  She was able to open the car and drove to the parking spot.  She had a hiking stick in the car and use it to walk her into her apartment and then she called 911.  Upon further questioning patient does not recall having any symptoms prior to the fall.  She denies prior symptoms of chest pain or shortness of breath.  After the fall she reported having headache and feeling odd.  She reports some nausea but no vomiting.  She reported feeling short of breath.  She reports feeling dizzy.  She denies abdominal pain, no diarrhea, no constipation, no dysuria, no leg swellings.    While she was in ER she started complaining of chest pain, midsternal, described as pressure she was given nitroglycerin which she improved her pain but worsened her headache.    In ER she was seen by Dr. Campos.  Initially she was tachycardic with heart rate in 130s, blood pressure 125/93, oxygen saturation 97% on room air, respiratory rate 20  Vital signs:  Temp: 97.5  F (36.4  C) Temp src: Oral BP: 106/68 Pulse: 90   Resp: 11 SpO2: 94 % O2 Device: None (Room air)   Height: 160 cm (5' 3\") Weight: 59.9 kg (132 lb)  Estimated body mass index is 23.38 kg/m  as calculated from the following:    Height as of this encounter: 1.6 m (5' 3\").    Weight as of this encounter: 59.9 kg (132 lb).     CBC RESULTS:   Recent Labs   Lab Test 03/05/25  2323   WBC 12.0*   RBC 4.21   HGB 12.1   HCT 36.1   MCV 86   MCH 28.7   MCHC 33.5   RDW 13.5         Last Comprehensive Metabolic Panel:  Lab Results   Component Value Date     (L) 03/05/2025    POTASSIUM 4.1 03/05/2025    CHLORIDE 101 03/05/2025    CO2 21 (L) 03/05/2025    ANIONGAP 12 03/05/2025     (H) 03/05/2025    BUN " 17.1 03/05/2025    CR 0.58 03/05/2025    GFRESTIMATED >90 03/05/2025    SHINE 9.3 03/05/2025      Liver Function Studies -   Recent Labs   Lab Test 03/05/25  2323   PROTTOTAL 6.5   ALBUMIN 3.9   BILITOTAL 0.5   ALKPHOS 90   AST 23   ALT 20      Initial troponin 492 repeat troponin, 648  Chest x-ray-no acute pathology    HEAD CT:  1.  No CT evidence for acute intracranial process.  2.  Presumed chronic microvascular ischemic changes as above.  3.  Mild posterior right parietal scalp swelling.  4.  Stable mixed lytic and sclerotic lesion in the posterior right parietal bone is likely benign given its well-circumscribed margins and stability when compared to an older CT dated 07/18/2024. Osseous metastasis is felt to be less likely but not  excluded. If there is a clinical history of malignancy, consider bone scan for further evaluation.     CERVICAL SPINE CT:  1.  No CT evidence for acute fracture or post traumatic subluxation.  2.  Degenerative changes with central canal and foraminal stenosis as described above.     CT chest with iv contrast:  1.  Negative for pulmonary embolism.  2.  Pulmonary interstitial edema with small pleural effusions.  3.  No significant change in size of right upper lobe nodules. There are a few new nodular opacities in the right lower lobe which may be inflammatory though are indeterminant. Consider continued CT surveillance.      EKG-sinus tachycardia with fusion complexes, right bundle branch block and left anterior fascicular block.  She had right bundle branch block before.  Repeat EKG-normal sinus rhythm at a rate of 97 bpm with PVCs, RBBB and left anterior fascicular block  ER attending discussed with cardiology on-call, given elevated troponins she was a started on heparin drip and hospitalist service was called regarding admission.    Past Medical History    No past medical history on file.    Past Surgical History   No past surgical history on file.    Prior to Admission Medications    Prior to Admission Medications   Prescriptions Last Dose Informant Patient Reported? Taking?   TRELEGY ELLIPTA 100-62.5-25 MCG/ACT oral inhaler   Yes No   Sig: Inhale 2 puffs into the lungs daily   albuterol (PROAIR HFA/PROVENTIL HFA/VENTOLIN HFA) 108 (90 Base) MCG/ACT inhaler   Yes No   Sig: Inhale 2 puffs into the lungs every 4 hours as needed   atorvastatin (LIPITOR) 10 MG tablet   No No   Sig: Take 1 tablet (10 mg) by mouth daily.   budesonide (PULMICORT) 0.5 MG/2ML neb solution   Yes No   Sig: Take 0.5 mg by nebulization daily as needed.   calcium carbonate (OS-SHINE) 1500 (600 Ca) MG tablet   Yes No   Sig: Take 600 mg by mouth daily.   cetirizine (ZYRTEC) 10 MG tablet   Yes No   Sig: Take 10 mg by mouth every evening.   conjugated estrogens (PREMARIN) 0.625 MG/GM vaginal cream   Yes No   Sig: Place vaginally daily as needed.   diltiazem ER (TIAZAC) 120 MG 24 hr ER beaded capsule   No No   Sig: Take 1 capsule (120 mg) by mouth 2 times daily.   docusate sodium (DSS) 100 MG capsule   Yes No   Sig: Take 1 capsule by mouth every evening.   fish oil-omega-3 fatty acids 1000 MG capsule   Yes No   Sig: Take 1 g by mouth 2 times daily.   fluticasone (FLONASE) 50 MCG/ACT nasal spray   Yes No   Sig: Spray 1 spray into both nostrils daily as needed for rhinitis or allergies.   ipratropium (ATROVENT) 0.02 % neb solution   Yes No   Sig: Inhale 0.5 mg into the lungs every 6 hours as needed for wheezing.   montelukast (SINGULAIR) 10 MG tablet   Yes No   Sig: Take 1 tablet by mouth At Bedtime   Patient not taking: Reported on 9/23/2024   omeprazole (PRILOSEC) 20 MG DR capsule   Yes No   Sig: Take 20 mg by mouth daily as needed.   polyethylene glycol (MIRALAX) 17 g packet   Yes No   Sig: Take 1 packet by mouth daily as needed for constipation.   polyethylene glycol-propylene glycol (SYSTANE ULTRA) 0.4-0.3 % SOLN ophthalmic solution   Yes No   Sig: Place 1-2 drops into both eyes 4 times daily as needed for dry eyes.       Facility-Administered Medications: None        Review of Systems    The 10 point Review of Systems is negative other than noted in the HPI or here.     Social History   I have reviewed this patient's social history and updated it with pertinent information if needed.  Social History     Tobacco Use    Smoking status: Never    Smokeless tobacco: Never         Family History   I have reviewed this patient's family history and updated it with pertinent information if needed.  Family History   Problem Relation Age of Onset    Heart Defect Mother     Myocardial Infarction Father          Allergies   Allergies   Allergen Reactions    Sulfa Antibiotics Unknown     Long time ago, she cannot remember    Diazepam Other (See Comments)     Makes more anxious    Meperidine Nausea and Vomiting and Unknown    Morphine Nausea and Unknown    Penicillins Itching    Zafirlukast      She does not remember        Physical Exam   Vital Signs: Temp: 97.5  F (36.4  C) Temp src: Oral BP: 111/77 Pulse: 93   Resp: 12 SpO2: 93 % O2 Device: None (Room air)    Weight: 132 lbs 0 oz    General Appearance: Awake/alert, no acute distress  Respiratory: Bilateral air entry, no wheezing, no rales, no crackles  Cardiovascular: S1-S2, mild tachycardia, no murmurs, no rubs  GI: Soft, nontender, bowel sounds are present  Skin: Intact, no rashes, no cyanosis  Neuro: AOx3, no focal neurological deficits    Medical Decision Making       75 MINUTES SPENT BY ME on the date of service doing chart review, history, exam, documentation & further activities per the note.  Tests REVIEWED in the past 24 hours:  - BMP  - CBC  - LFTs  - Troponin  Tests personally interpreted in the past 24 hours:  - EKG tracing showing as above  - CHEST XRAY showing as above  - HEAD CT showing no acute intracranial pathology       Data     I have personally reviewed the following data over the past 24 hrs:    12.0 (H)  \   12.1   / 346     134 (L) 101 17.1 /  108 (H)   4.1 21 (L) 0.58  \     ALT: 20 AST: 23 AP: 90 TBILI: 0.5   ALB: 3.9 TOT PROTEIN: 6.5 LIPASE: N/A     Trop: 648 (HH) BNP: 511     INR:  N/A PTT:  32   D-dimer:  N/A Fibrinogen:  N/A       Imaging results reviewed over the past 24 hrs:   Recent Results (from the past 24 hours)   XR Chest 2 Views    Narrative    EXAM: XR CHEST 2 VIEWS  LOCATION: [facility]  DATE: 3/5/2025    INDICATION: Chest pressure after a fall.  COMPARISON: 11/11/2024.      Impression    IMPRESSION: Heart size and pulmonary vascularity within normal limits. No acute infiltrates or consolidation. Negative for pneumothorax. Calcified hilar and mediastinal lymph nodes compatible prior granulomatous disease. Degenerative changes throughout   spine. Bilateral shoulder arthroplasties. No definite acute findings.    Head CT w/o contrast    Narrative    EXAM: CT HEAD W/O CONTRAST, CT CERVICAL SPINE W/O CONTRAST  LOCATION: [facility]  DATE: 3/5/2025    INDICATION: trauma  COMPARISON: Dated 01/02/2025  TECHNIQUE:   1) Routine CT Head without IV contrast. Multiplanar reformats. Dose reduction techniques were used.  2) Routine CT Cervical Spine without IV contrast. Multiplanar reformats. Dose reduction techniques were used.    FINDINGS:   HEAD CT:   INTRACRANIAL CONTENTS: No intracranial hemorrhage, extraaxial collection, or mass effect.  No CT evidence of acute infarct. Mild to moderate presumed chronic small vessel ischemic changes. Normal ventricles and sulci.     VISUALIZED ORBITS/SINUSES/MASTOIDS: Prior bilateral cataract surgery. Visualized portions of the orbits are otherwise unremarkable. No paranasal sinus mucosal disease. No middle ear or mastoid effusion.    BONES/SOFT TISSUES: Mild posterior right parietal scalp swelling. No skull fracture. Stable mixed lytic and sclerotic lesion in the posterior right parietal bone with well-circumscribed margins (images 21 and 22 series 4) measures up to 2.0 cm in   diameter.  Stable mottled well-circumscribed lytic lesion in the anterior right parietal bone with well-circumscribed margins (image 25 series 4) measures up to 1.4 cm in diameter and is compatible with a benign hemangioma    CERVICAL SPINE CT:   VERTEBRA: Head rotated to the left. Very mild left lateral bending of the neck is likely positional. Straightening of the cervical lordosis. Minimal degenerative anterolisthesis of C3 on C4, slight degenerative retrolisthesis of C6 on C7, and slight   degenerative anterolisthesis of C7 on T1. Otherwise normal vertebral alignment. Normal vertebral body heights. No fracture or posttraumatic subluxation. Solid bony intervertebral fusion at C5-C6. Solid bony fusion of the bilateral C2-C4 and bilateral   C5-C6 facet joints. Marked degenerative arthritis atlantoaxial joint and mild degenerative arthritis and atlantooccipital joint. Marked degenerative disc disease C4-C5 and C6-C7, moderate degenerative disc disease C3-C4, and mild degenerative disc   disease C7-T1.    CANAL/FORAMINA: Moderate central canal stenosis at C5-C6 and C6-C7 and mild central canal stenosis at C4-C5 due to posterior disc/osteophyte complexes. Marked left and mild right C3-C4, marked right and moderate to marked left C4-C5, moderate and mild   left C5-C6, marked right and moderate to marked left C6-C7, and moderate right and mild left C7-T1 foraminal narrowing due to facet hypertrophic changes and uncovertebral osteophytes.    PARASPINAL: No extraspinal abnormality. Mild biapical lung scarring      Impression    IMPRESSION:  HEAD CT:  1.  No CT evidence for acute intracranial process.  2.  Presumed chronic microvascular ischemic changes as above.  3.  Mild posterior right parietal scalp swelling.  4.  Stable mixed lytic and sclerotic lesion in the posterior right parietal bone is likely benign given its well-circumscribed margins and stability when compared to an older CT dated 07/18/2024. Osseous metastasis is felt  to be less likely but not   excluded. If there is a clinical history of malignancy, consider bone scan for further evaluation.    CERVICAL SPINE CT:  1.  No CT evidence for acute fracture or post traumatic subluxation.  2.  Degenerative changes with central canal and foraminal stenosis as described above.     CT Cervical Spine w/o Contrast    Narrative    EXAM: CT HEAD W/O CONTRAST, CT CERVICAL SPINE W/O CONTRAST  LOCATION: [facility]  DATE: 3/5/2025    INDICATION: trauma  COMPARISON: Dated 01/02/2025  TECHNIQUE:   1) Routine CT Head without IV contrast. Multiplanar reformats. Dose reduction techniques were used.  2) Routine CT Cervical Spine without IV contrast. Multiplanar reformats. Dose reduction techniques were used.    FINDINGS:   HEAD CT:   INTRACRANIAL CONTENTS: No intracranial hemorrhage, extraaxial collection, or mass effect.  No CT evidence of acute infarct. Mild to moderate presumed chronic small vessel ischemic changes. Normal ventricles and sulci.     VISUALIZED ORBITS/SINUSES/MASTOIDS: Prior bilateral cataract surgery. Visualized portions of the orbits are otherwise unremarkable. No paranasal sinus mucosal disease. No middle ear or mastoid effusion.    BONES/SOFT TISSUES: Mild posterior right parietal scalp swelling. No skull fracture. Stable mixed lytic and sclerotic lesion in the posterior right parietal bone with well-circumscribed margins (images 21 and 22 series 4) measures up to 2.0 cm in   diameter. Stable mottled well-circumscribed lytic lesion in the anterior right parietal bone with well-circumscribed margins (image 25 series 4) measures up to 1.4 cm in diameter and is compatible with a benign hemangioma    CERVICAL SPINE CT:   VERTEBRA: Head rotated to the left. Very mild left lateral bending of the neck is likely positional. Straightening of the cervical lordosis. Minimal degenerative anterolisthesis of C3 on C4, slight degenerative retrolisthesis of C6 on C7, and  slight   degenerative anterolisthesis of C7 on T1. Otherwise normal vertebral alignment. Normal vertebral body heights. No fracture or posttraumatic subluxation. Solid bony intervertebral fusion at C5-C6. Solid bony fusion of the bilateral C2-C4 and bilateral   C5-C6 facet joints. Marked degenerative arthritis atlantoaxial joint and mild degenerative arthritis and atlantooccipital joint. Marked degenerative disc disease C4-C5 and C6-C7, moderate degenerative disc disease C3-C4, and mild degenerative disc   disease C7-T1.    CANAL/FORAMINA: Moderate central canal stenosis at C5-C6 and C6-C7 and mild central canal stenosis at C4-C5 due to posterior disc/osteophyte complexes. Marked left and mild right C3-C4, marked right and moderate to marked left C4-C5, moderate and mild   left C5-C6, marked right and moderate to marked left C6-C7, and moderate right and mild left C7-T1 foraminal narrowing due to facet hypertrophic changes and uncovertebral osteophytes.    PARASPINAL: No extraspinal abnormality. Mild biapical lung scarring      Impression    IMPRESSION:  HEAD CT:  1.  No CT evidence for acute intracranial process.  2.  Presumed chronic microvascular ischemic changes as above.  3.  Mild posterior right parietal scalp swelling.  4.  Stable mixed lytic and sclerotic lesion in the posterior right parietal bone is likely benign given its well-circumscribed margins and stability when compared to an older CT dated 07/18/2024. Osseous metastasis is felt to be less likely but not   excluded. If there is a clinical history of malignancy, consider bone scan for further evaluation.    CERVICAL SPINE CT:  1.  No CT evidence for acute fracture or post traumatic subluxation.  2.  Degenerative changes with central canal and foraminal stenosis as described above.

## 2025-03-06 NOTE — PHARMACY-ADMISSION MEDICATION HISTORY
Pharmacist Admission Medication History    Admission medication history is complete. The information provided in this note is only as accurate as the sources available at the time of the update.    Information Source(s): Patient and CareEverywhere/SureScripts via in-person    Pertinent Information: Pt states Eliquis dose was reduced from 5 mg BID to 2.5 mg BID due to frequent blood blisters/bruising on skin    Changes made to PTA medication list:  Added: Eliquis, Ketoconazole, Ocean Nasal  Deleted: Singulair  Changed: Flonase, Pulmicort    Allergies reviewed with patient and updates made in EHR: no    Medication History Completed By: Heidi Fung RPH 3/6/2025 9:53 AM    PTA Med List   Medication Sig Last Dose/Taking    albuterol (PROAIR HFA/PROVENTIL HFA/VENTOLIN HFA) 108 (90 Base) MCG/ACT inhaler Inhale 2 puffs into the lungs every 4 hours as needed Taking As Needed    apixaban ANTICOAGULANT (ELIQUIS) 2.5 MG tablet Take 2.5 mg by mouth 2 times daily. 3/5/2025 Morning    atorvastatin (LIPITOR) 10 MG tablet Take 1 tablet (10 mg) by mouth daily. 3/5/2025 Morning    budesonide (PULMICORT) 0.5 MG/2ML neb solution Take 0.5 mg by nebulization every evening. 3/4/2025    calcium carbonate (OS-SHINE) 1500 (600 Ca) MG tablet Take 600 mg by mouth daily. 3/5/2025    cetirizine (ZYRTEC) 10 MG tablet Take 10 mg by mouth every evening. 3/4/2025    conjugated estrogens (PREMARIN) 0.625 MG/GM vaginal cream Place vaginally daily as needed. Taking As Needed    diltiazem ER (TIAZAC) 120 MG 24 hr ER beaded capsule Take 1 capsule (120 mg) by mouth 2 times daily. 3/5/2025 Morning    docusate sodium (DSS) 100 MG capsule Take 1 capsule by mouth every evening. 3/4/2025    fish oil-omega-3 fatty acids 1000 MG capsule Take 1 g by mouth 2 times daily. 3/5/2025 Morning    fluticasone (FLONASE) 50 MCG/ACT nasal spray Spray 1 spray into both nostrils 2 times daily. 3/5/2025    ipratropium (ATROVENT) 0.02 % neb solution Inhale 0.5 mg into the  lungs every 6 hours as needed for wheezing. Taking As Needed    ketoconazole (NIZORAL) 2 % external cream Apply topically daily. To feet 3/5/2025    omeprazole (PRILOSEC) 20 MG DR capsule Take 20 mg by mouth daily as needed. Taking As Needed    polyethylene glycol (MIRALAX) 17 g packet Take 1 packet by mouth daily as needed for constipation. Taking As Needed    polyethylene glycol-propylene glycol (SYSTANE ULTRA) 0.4-0.3 % SOLN ophthalmic solution Place 1-2 drops into both eyes 4 times daily as needed for dry eyes. Taking As Needed    sodium chloride (OCEAN) 0.65 % nasal spray Spray 1 spray into both nostrils 2 times daily. 3/5/2025    TRELEGY ELLIPTA 100-62.5-25 MCG/ACT oral inhaler Inhale 2 puffs into the lungs daily 3/5/2025

## 2025-03-07 ENCOUNTER — TRANSFERRED RECORDS (OUTPATIENT)
Dept: MULTI SPECIALTY CLINIC | Facility: CLINIC | Age: 80
End: 2025-03-07

## 2025-03-07 ENCOUNTER — APPOINTMENT (OUTPATIENT)
Dept: CARDIOLOGY | Facility: CLINIC | Age: 80
DRG: 281 | End: 2025-03-07
Attending: INTERNAL MEDICINE
Payer: COMMERCIAL

## 2025-03-07 LAB
APTT PPP: 63 SECONDS (ref 22–38)
CATH EF ESTIMATED: 35 %
CHOLEST SERPL-MCNC: 175 MG/DL
EJECTION FRACTION: NORMAL %
HDLC SERPL-MCNC: 78 MG/DL
LDLC SERPL CALC-MCNC: 88 MG/DL
LVEF ECHO: NORMAL
NONHDLC SERPL-MCNC: 97 MG/DL
TRIGL SERPL-MCNC: 47 MG/DL

## 2025-03-07 PROCEDURE — 36415 COLL VENOUS BLD VENIPUNCTURE: CPT | Performed by: HOSPITALIST

## 2025-03-07 PROCEDURE — 99232 SBSQ HOSP IP/OBS MODERATE 35: CPT | Mod: 25 | Performed by: INTERNAL MEDICINE

## 2025-03-07 PROCEDURE — 93458 L HRT ARTERY/VENTRICLE ANGIO: CPT | Mod: 26 | Performed by: INTERNAL MEDICINE

## 2025-03-07 PROCEDURE — C8929 TTE W OR WO FOL WCON,DOPPLER: HCPCS

## 2025-03-07 PROCEDURE — 93458 L HRT ARTERY/VENTRICLE ANGIO: CPT | Performed by: INTERNAL MEDICINE

## 2025-03-07 PROCEDURE — B2111ZZ FLUOROSCOPY OF MULTIPLE CORONARY ARTERIES USING LOW OSMOLAR CONTRAST: ICD-10-PCS | Performed by: INTERNAL MEDICINE

## 2025-03-07 PROCEDURE — 99152 MOD SED SAME PHYS/QHP 5/>YRS: CPT | Performed by: INTERNAL MEDICINE

## 2025-03-07 PROCEDURE — 999N000208 ECHOCARDIOGRAM COMPLETE

## 2025-03-07 PROCEDURE — 99233 SBSQ HOSP IP/OBS HIGH 50: CPT | Performed by: HOSPITALIST

## 2025-03-07 PROCEDURE — 85730 THROMBOPLASTIN TIME PARTIAL: CPT | Performed by: HOSPITALIST

## 2025-03-07 PROCEDURE — 250N000009 HC RX 250: Performed by: HOSPITALIST

## 2025-03-07 PROCEDURE — 250N000013 HC RX MED GY IP 250 OP 250 PS 637: Performed by: NURSE PRACTITIONER

## 2025-03-07 PROCEDURE — C1894 INTRO/SHEATH, NON-LASER: HCPCS | Performed by: INTERNAL MEDICINE

## 2025-03-07 PROCEDURE — 999N000157 HC STATISTIC RCP TIME EA 10 MIN

## 2025-03-07 PROCEDURE — 250N000009 HC RX 250: Performed by: INTERNAL MEDICINE

## 2025-03-07 PROCEDURE — 250N000011 HC RX IP 250 OP 636: Performed by: INTERNAL MEDICINE

## 2025-03-07 PROCEDURE — 4A023N7 MEASUREMENT OF CARDIAC SAMPLING AND PRESSURE, LEFT HEART, PERCUTANEOUS APPROACH: ICD-10-PCS | Performed by: INTERNAL MEDICINE

## 2025-03-07 PROCEDURE — C1769 GUIDE WIRE: HCPCS | Performed by: INTERNAL MEDICINE

## 2025-03-07 PROCEDURE — 272N000001 HC OR GENERAL SUPPLY STERILE: Performed by: INTERNAL MEDICINE

## 2025-03-07 PROCEDURE — 250N000013 HC RX MED GY IP 250 OP 250 PS 637: Performed by: HOSPITALIST

## 2025-03-07 PROCEDURE — 94640 AIRWAY INHALATION TREATMENT: CPT

## 2025-03-07 PROCEDURE — 250N000013 HC RX MED GY IP 250 OP 250 PS 637: Performed by: INTERNAL MEDICINE

## 2025-03-07 PROCEDURE — 258N000003 HC RX IP 258 OP 636: Performed by: INTERNAL MEDICINE

## 2025-03-07 PROCEDURE — 255N000002 HC RX 255 OP 636: Performed by: INTERNAL MEDICINE

## 2025-03-07 PROCEDURE — 210N000001 HC R&B IMCU HEART CARE

## 2025-03-07 PROCEDURE — C1887 CATHETER, GUIDING: HCPCS | Performed by: INTERNAL MEDICINE

## 2025-03-07 PROCEDURE — B2151ZZ FLUOROSCOPY OF LEFT HEART USING LOW OSMOLAR CONTRAST: ICD-10-PCS | Performed by: INTERNAL MEDICINE

## 2025-03-07 PROCEDURE — 99153 MOD SED SAME PHYS/QHP EA: CPT | Performed by: INTERNAL MEDICINE

## 2025-03-07 PROCEDURE — 93306 TTE W/DOPPLER COMPLETE: CPT | Mod: 26 | Performed by: INTERNAL MEDICINE

## 2025-03-07 RX ORDER — METOPROLOL SUCCINATE 25 MG/1
25 TABLET, EXTENDED RELEASE ORAL DAILY
Status: DISCONTINUED | OUTPATIENT
Start: 2025-03-07 | End: 2025-03-08 | Stop reason: HOSPADM

## 2025-03-07 RX ORDER — NITROGLYCERIN 5 MG/ML
VIAL (ML) INTRAVENOUS
Status: DISCONTINUED | OUTPATIENT
Start: 2025-03-07 | End: 2025-03-07 | Stop reason: HOSPADM

## 2025-03-07 RX ORDER — OXYCODONE HYDROCHLORIDE 5 MG/1
10 TABLET ORAL EVERY 4 HOURS PRN
Status: DISCONTINUED | OUTPATIENT
Start: 2025-03-07 | End: 2025-03-08 | Stop reason: HOSPADM

## 2025-03-07 RX ORDER — FLUTICASONE FUROATE AND VILANTEROL 100; 25 UG/1; UG/1
1 POWDER RESPIRATORY (INHALATION) DAILY
Status: DISCONTINUED | OUTPATIENT
Start: 2025-03-07 | End: 2025-03-08 | Stop reason: HOSPADM

## 2025-03-07 RX ORDER — ACETAMINOPHEN 325 MG/1
650 TABLET ORAL EVERY 4 HOURS PRN
Status: DISCONTINUED | OUTPATIENT
Start: 2025-03-07 | End: 2025-03-07

## 2025-03-07 RX ORDER — LORAZEPAM 2 MG/ML
0.5 INJECTION INTRAMUSCULAR
Status: DISCONTINUED | OUTPATIENT
Start: 2025-03-07 | End: 2025-03-07 | Stop reason: HOSPADM

## 2025-03-07 RX ORDER — VERAPAMIL HYDROCHLORIDE 2.5 MG/ML
INJECTION, SOLUTION INTRAVENOUS
Status: DISCONTINUED | OUTPATIENT
Start: 2025-03-07 | End: 2025-03-07 | Stop reason: HOSPADM

## 2025-03-07 RX ORDER — SODIUM CHLORIDE 9 MG/ML
INJECTION, SOLUTION INTRAVENOUS CONTINUOUS
Status: DISCONTINUED | OUTPATIENT
Start: 2025-03-07 | End: 2025-03-07 | Stop reason: HOSPADM

## 2025-03-07 RX ORDER — LIDOCAINE 40 MG/G
CREAM TOPICAL
Status: DISCONTINUED | OUTPATIENT
Start: 2025-03-07 | End: 2025-03-07

## 2025-03-07 RX ORDER — HEPARIN SODIUM 1000 [USP'U]/ML
INJECTION, SOLUTION INTRAVENOUS; SUBCUTANEOUS
Status: DISCONTINUED | OUTPATIENT
Start: 2025-03-07 | End: 2025-03-07 | Stop reason: HOSPADM

## 2025-03-07 RX ORDER — POTASSIUM CHLORIDE 1500 MG/1
20 TABLET, EXTENDED RELEASE ORAL
Status: DISCONTINUED | OUTPATIENT
Start: 2025-03-07 | End: 2025-03-07 | Stop reason: HOSPADM

## 2025-03-07 RX ORDER — IOPAMIDOL 755 MG/ML
INJECTION, SOLUTION INTRAVASCULAR
Status: DISCONTINUED | OUTPATIENT
Start: 2025-03-07 | End: 2025-03-07 | Stop reason: HOSPADM

## 2025-03-07 RX ORDER — NALOXONE HYDROCHLORIDE 0.4 MG/ML
0.2 INJECTION, SOLUTION INTRAMUSCULAR; INTRAVENOUS; SUBCUTANEOUS
Status: ACTIVE | OUTPATIENT
Start: 2025-03-07 | End: 2025-03-07

## 2025-03-07 RX ORDER — SODIUM CHLORIDE 9 MG/ML
75 INJECTION, SOLUTION INTRAVENOUS CONTINUOUS
Status: ACTIVE | OUTPATIENT
Start: 2025-03-07 | End: 2025-03-07

## 2025-03-07 RX ORDER — FLUMAZENIL 0.1 MG/ML
0.2 INJECTION, SOLUTION INTRAVENOUS
Status: ACTIVE | OUTPATIENT
Start: 2025-03-07 | End: 2025-03-07

## 2025-03-07 RX ORDER — ATROPINE SULFATE 0.1 MG/ML
0.5 INJECTION INTRAVENOUS
Status: ACTIVE | OUTPATIENT
Start: 2025-03-07 | End: 2025-03-07

## 2025-03-07 RX ORDER — ASPIRIN 325 MG
325 TABLET ORAL ONCE
Status: COMPLETED | OUTPATIENT
Start: 2025-03-07 | End: 2025-03-07

## 2025-03-07 RX ORDER — NALOXONE HYDROCHLORIDE 0.4 MG/ML
0.4 INJECTION, SOLUTION INTRAMUSCULAR; INTRAVENOUS; SUBCUTANEOUS
Status: ACTIVE | OUTPATIENT
Start: 2025-03-07 | End: 2025-03-07

## 2025-03-07 RX ORDER — OXYCODONE HYDROCHLORIDE 5 MG/1
5 TABLET ORAL EVERY 4 HOURS PRN
Status: DISCONTINUED | OUTPATIENT
Start: 2025-03-07 | End: 2025-03-08 | Stop reason: HOSPADM

## 2025-03-07 RX ORDER — BUDESONIDE 0.5 MG/2ML
0.5 INHALANT ORAL EVERY EVENING
Status: DISCONTINUED | OUTPATIENT
Start: 2025-03-07 | End: 2025-03-08 | Stop reason: HOSPADM

## 2025-03-07 RX ORDER — ASPIRIN 81 MG/1
243 TABLET, CHEWABLE ORAL ONCE
Status: COMPLETED | OUTPATIENT
Start: 2025-03-07 | End: 2025-03-07

## 2025-03-07 RX ORDER — LORAZEPAM 0.5 MG/1
0.5 TABLET ORAL
Status: DISCONTINUED | OUTPATIENT
Start: 2025-03-07 | End: 2025-03-07 | Stop reason: HOSPADM

## 2025-03-07 RX ORDER — FENTANYL CITRATE 50 UG/ML
INJECTION, SOLUTION INTRAMUSCULAR; INTRAVENOUS
Status: DISCONTINUED | OUTPATIENT
Start: 2025-03-07 | End: 2025-03-07 | Stop reason: HOSPADM

## 2025-03-07 RX ORDER — FENTANYL CITRATE 50 UG/ML
25 INJECTION, SOLUTION INTRAMUSCULAR; INTRAVENOUS
Status: DISCONTINUED | OUTPATIENT
Start: 2025-03-07 | End: 2025-03-08 | Stop reason: HOSPADM

## 2025-03-07 RX ADMIN — PERFLUTREN 1.5 ML: 6.52 INJECTION, SUSPENSION INTRAVENOUS at 12:11

## 2025-03-07 RX ADMIN — APIXABAN 2.5 MG: 2.5 TABLET, FILM COATED ORAL at 21:07

## 2025-03-07 RX ADMIN — ACETAMINOPHEN 650 MG: 325 TABLET, FILM COATED ORAL at 00:51

## 2025-03-07 RX ADMIN — METOPROLOL SUCCINATE 25 MG: 25 TABLET, EXTENDED RELEASE ORAL at 15:49

## 2025-03-07 RX ADMIN — LOSARTAN POTASSIUM 12.5 MG: 25 TABLET, FILM COATED ORAL at 21:06

## 2025-03-07 RX ADMIN — SODIUM CHLORIDE 500 ML: 9 INJECTION, SOLUTION INTRAVENOUS at 02:02

## 2025-03-07 RX ADMIN — ATORVASTATIN CALCIUM 10 MG: 10 TABLET, FILM COATED ORAL at 12:14

## 2025-03-07 RX ADMIN — PANTOPRAZOLE SODIUM 40 MG: 40 TABLET, DELAYED RELEASE ORAL at 06:31

## 2025-03-07 RX ADMIN — BUDESONIDE 0.5 MG: 0.5 INHALANT RESPIRATORY (INHALATION) at 23:36

## 2025-03-07 RX ADMIN — ASPIRIN 325 MG ORAL TABLET 325 MG: 325 PILL ORAL at 08:19

## 2025-03-07 ASSESSMENT — ACTIVITIES OF DAILY LIVING (ADL)
ADLS_ACUITY_SCORE: 59
DEPENDENT_IADLS:: INDEPENDENT
ADLS_ACUITY_SCORE: 59
ADLS_ACUITY_SCORE: 57
ADLS_ACUITY_SCORE: 59

## 2025-03-07 NOTE — PROGRESS NOTES
MD Notification    Notified Person: MD    Notified Person Name: Leonidas Oliver    Notification Date/Time: 3/7/25 0115    Notification Interaction: vocera    Purpose of Notification: Pt on IV nitro 10mcg/min, just got up to the bathroom and back to bed, now complaining of dizziness, BP has been soft mostly in the 90s, last reading 117/77      Orders Received:fluid bolus ordered    Comments:

## 2025-03-07 NOTE — PROGRESS NOTES
[facility]    Hospitalist Progress Note    Interval History   Patient is awake and alert.  No acute events overnight.  Did have some lightheadedness while on nitro drip.  Underwent left heart cath this morning with no intervention.  Wrist access site appears stable    -Data reviewed today: I reviewed all new labs and imaging results over the last 24 hours. I personally reviewed the left heart cath image(s) showing possible Takotsubo's cardiomyopathy with EF 35 to 40% with severe apical ballooning of the left ventricle.  Otherwise coronary arteries appear normal. .    Physical Exam   Temp: 97.4  F (36.3  C) Temp src: Oral BP: 117/76 Pulse: 75   Resp: 16 SpO2: 95 % O2 Device: None (Room air)    Vitals:    03/06/25 0142 03/07/25 0540   Weight: 59.9 kg (132 lb) 61.6 kg (135 lb 11.2 oz)     Vital Signs with Ranges  Temp:  [97.4  F (36.3  C)-97.9  F (36.6  C)] 97.4  F (36.3  C)  Pulse:  [67-89] 75  Resp:  [13-22] 16  BP: ()/() 117/76  SpO2:  [94 %-97 %] 95 %  I/O last 3 completed shifts:  In: 240 [P.O.:240]  Out: -     Physical Exam  Constitutional:       Appearance: Normal appearance.   Cardiovascular:      Rate and Rhythm: Normal rate and regular rhythm.      Pulses: Normal pulses.      Heart sounds: Normal heart sounds.   Pulmonary:      Effort: Pulmonary effort is normal. No respiratory distress.      Breath sounds: Normal breath sounds.   Abdominal:      General: Abdomen is flat. Bowel sounds are normal. There is no distension.      Tenderness: There is no abdominal tenderness. There is no guarding.   Skin:     General: Skin is warm and dry.   Neurological:      General: No focal deficit present.           Medications   Current Facility-Administered Medications   Medication Dose Route Frequency Provider Last Rate Last Admin    medication instruction   Does not apply Continuous PRN Darryl Chris MD        Patient is already receiving anticoagulation with heparin,  enoxaparin (LOVENOX), warfarin (COUMADIN)  or other anticoagulant medication   Does not apply Continuous PRN Darryl Chris MD        Reason ACE/ARB/ARNI order not selected   Other DOES NOT GO TO Darryl Harper MD        Reason beta blocker not prescribed   Does not apply DOES NOT GO TO Darryl Harper MD        Reason statin not prescribed   Oral DOES NOT GO TO Darryl Harper MD        sodium chloride 0.9 % infusion  75 mL/hr Intravenous Continuous Darryl Chris MD         Current Facility-Administered Medications   Medication Dose Route Frequency Provider Last Rate Last Admin    apixaban ANTICOAGULANT (ELIQUIS) tablet 2.5 mg  2.5 mg Oral BID Kay Mcfarlane MD        aspirin (ASA) chewable tablet 81 mg  81 mg Oral Daily Darryl Chris MD        atorvastatin (LIPITOR) tablet 10 mg  10 mg Oral Daily Darryl Chris MD   10 mg at 03/07/25 1214    budesonide (PULMICORT) neb solution 0.5 mg  0.5 mg Nebulization QPM Kay Mcfarlane MD        [Held by provider] diltiazem ER COATED BEADS (CARDIZEM CD/CARTIA XT) 24 hr capsule 120 mg  120 mg Oral BID Darryl Chris MD   120 mg at 03/06/25 2003    fluticasone-vilanterol (BREO ELLIPTA) 100-25 MCG/ACT inhaler 1 puff  1 puff Inhalation Daily Kay Mcfarlane MD        And    umeclidinium (INCRUSE ELLIPTA) 62.5 MCG/ACT inhaler 1 puff  1 puff Inhalation Daily Kay Mcfarlane MD        pantoprazole (PROTONIX) EC tablet 40 mg  40 mg Oral QAM AC Darryl Chris MD   40 mg at 03/07/25 0631    sodium chloride (PF) 0.9% PF flush 3 mL  3 mL Intracatheter Q8H Darryl Chris MD   3 mL at 03/06/25 2105       Data   Recent Labs   Lab 03/06/25  0618 03/05/25  2323   WBC 9.3 12.0*   HGB 11.0* 12.1   MCV 85 86    346   * 134*   POTASSIUM 4.0 4.1   CHLORIDE 99 101   CO2 21* 21*   BUN 15.1 17.1   CR 0.60 0.58   ANIONGAP 10 12   SHINE 8.4* 9.3   GLC 91 108*   ALBUMIN  --  3.9   PROTTOTAL  --  6.5    BILITOTAL  --  0.5   ALKPHOS  --  90   ALT  --  20   AST  --  23       Recent Results (from the past 24 hours)   Cardiac Catheterization   Result Value    Cath EF Estimated 35    Narrative      The ejection fraction is 30-40% by visual estimate.    Left ventricular filling pressures are normal.    Takostsubo Cardiomyopathy : NSTEMI in setting of emotional stress from   mechanical fall    Findings  LV severe apical ballooning with sparing of base LVEF 35 to 40% . Typical   for Takotsubo    Coronaries  LMCA normal  CX normal   RCA dominant nornmal  LAD minimal atheromatous changes           Assessment & Plan      Margareth Hogue is a 79 year old female admitted on 3/5/2025.   Margareth Hogue is a 79 year old female with past medical history of hypertension, paroxysmal A-fib on Eliquis, dyslipidemia, TIA, gastroesophageal reflux disease, renal cell carcinoma status post right nephrectomy, CKD stage II and asthma who presented to ER for evaluation after a fall.  She was found to have elevated troponin suggestive of NSTEMI.     # NSTEMI  Possible Takotsubo's cardiomyopathy  -The patient presented after she fell on ice last evening.  Initially it was felt that this was a mechanical fall but she does not really remember what happened prior to her fall  -Initially in ER she was tachycardic, EKG showed sinus tachycardia with fusion complexes, right bundle branch block and left anterior fascicular block  -She denies having any chest pain prior to the fall but while she was in ER she complained of some chest pain described as a pressure; chest pain improved with nitroglycerin  -Troponin trend 492--648--588--417  -Chest x-ray-no acute pathology  -CT chest-no PE;pulmonary interstitial edema with small pleural effusions   -ER attending discussed with cardiology on-call Dr. Lindsey  -Aspirin 162 mg given in ER and she was started on heparin drip  -Admit inpatient status  -Monitor on telemetry  -Aspirin 81 mg p.o. daily  -Patient was  started on heparin drip and nitro drip for ongoing chest pain on admission.  -Underwent left heart cath on 3/7/2025 that showed no significant coronary artery disease but did show reduced EF of 40% with apical ballooning suggestive of stress-induced cardiomyopathy likely from fall.  -Echocardiogram done this morning.  Read pending.  -Cardiology following.  Currently not on goal-directed therapy.  -Will hold diltiazem in the setting of reduced EF and await final cardiology recommendations.  -Restarted apixaban 2.5 mg twice daily after.  -cardiac rehab     # Fall  -Unclear if this was a mechanical fall related to slipping or ice versus cardiac event  -She did hit her head  -CT of the head and CT of the C-spine negative for acute pathology  -Telemetry  -Fall precautions  -PT evaluation     # Hypertension  -On diltiazem 120 mg p.o. twice daily.  Holding this for now in the setting of newly reduced EF.  Blood pressure well-controlled here.  Final cardiology recommendations pending.     # History of paroxysmal A-fib  -PTA on diltiazem ER and Eliquis  -Holding diltiazem for now.  Restarted Eliquis post cath.  -Discontinued heparin drip.     # History of dyslipidemia  -Check fasting lipid profile  -On atorvastatin 10 mg daily     # h/o CVA/TIA  -On aspirin 81 daily, atorvastatin 10 mg daily and apixaban 2.5 mg twice daily     # History of asthma  -Acute issue at this time, no wheezing  -On Breo and Incruse inhaler     # GERD  -Resume PTA PPI     # History of sarcoidosis, lung  -Noted        Clinically Significant Risk Factors      # Hyponatremia: Lowest Na = 130 mmol/L in last 2 days, will monitor as appropriate   # Hypocalcemia: Lowest Ca = 8.4 mg/dL in last 2 days, will monitor and replace as appropriate                        # Financial/Environmental Concerns: none  # Asthma: noted on problem list         DVT Prophylaxis: Pneumatic Compression Devices  Code Status: Full Code  Medically Ready for Discharge: Anticipated  Tomorrow      Please see A&P for additional details of medical decision making.  55 MINUTES SPENT BY ME on the date of service doing chart review, history, exam, documentation & further activities per the note.    Kay Mcfarlane MD, MD  [phone](p)

## 2025-03-07 NOTE — PROGRESS NOTES
[facility]    Cardiology Progress Note    Primary Cardiologist: Dr. Hudson Mcmillan (EP), Dr. Melo Louis (General)    Date of Admission: 3/5/2025  Service Date: 03/07/25    Summary:  Ms. Margareth Hogue is a very pleasant 79 year old female with a past medical history of symptomatic atrial tachyarrhythmia (paroxysmal atrial fibrillation and atrial tachycardia), dyslipidemia, renal cell carcinoma status post right nephrectomy, pulmonary sarcoidosis, GERD, TIA hx, who was admitted on 3/5/2025 for ground level fall. Cardiology was consulted for chest pain.    Interval History   Patient states that she is doing well today. She notes that she had experienced some dizziness throughout the night while being administered her nitroglycerin drip. This has since subsided. She denies light headedness or chest pain today. She has been informed of recommendations for low sodium diet.     Telemetry: ***    Assessment & Plan   1. Atrial Tachyarrhythmias (paroxysmal atrial fibrillation and paroxysmal atrial tachycardia)  - S/P PVI in 2013 with recurrent Atrial Fibrillation. Trialed on propafenone with breakthrough arrhythmias and discontinued. Started on diltiazem 120 mg BID for rate control  - [id number] score of 5 (age++, TIA ++, gender) anticoagulated on apixaban 2.5 mg BID  - Symptomatic improvement with diltiazem  - HR trends in the 70's and 80's  - Denies current dizziness or lightheadedness today    2. 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 Typical for Takotsubo  -Takotsubo in setting of emotional stress from mechanical fall  - Coronary angiogram 3/7/25 shows ejection fraction of 30-40%  - Echocardiogram completed 3/7/25 shows   Moderate to severe anterior, septal, and apical wall hypokinesis.  Left ventricular systolic function is moderately reduced.  The visual ejection fraction is 35-40%.  The left  Bedside report given to Samantha Pittman RN care transferred.  Rama Ortiz ventricle is mildly dilated.  The right ventricle is normal in structure, function and size.  There is mild (1+) mitral regurgitation.  RV systolic pressure is elevated, consistent with moderate  pulmonary hypertension.    3. Dyslipidemia    - LDL 80, HDL 70 3/6/25  - PTA Atorvastatin 10mg daily  - Coronary angiogram 3/7/25 shows 0% vessel diease in left main, left circumflex, and right coronary artery. Minimal atheromatous changes with no focal stenosis in LAD    Plan:   1. Atrial Tachyarrhythmias (paroxysmal atrial fibrillation and paroxysmal atrial tachycardia)  - Given EF 30-40% shown on coronary angiogram 3/7/25, HOLD diltiazem  - Discontinue heparin and aspirin 81 mg  - Continue apixiban 2.5mg BID tonight  - Start Toprol XL 25mg daily  - Start losartan 12.5 mg at night    2. Takotsubo Cardiomyopathy with HFrEF of 30-40%  - Echocardiogram to be completed 3/7/25  - Patient instructed on 2000 mg salt intake diet   - Follow up with general cardiology in one month   - Repeat an outpatient echocardiogram in 6 weeks    3. Dyslipidemia  - Continue atorvastatin 10 mg daily    Thank you for the opportunity to participate in this pleasant patient's care.     Gill Romero PA-C  Physician Assistant  [facility]  (8am - 5pm, M-F)    Patient Active Problem List   Diagnosis    Acute hyponatremia    Allergic conjunctivitis of both eyes    Bilateral sensorineural hearing loss    Chronic insomnia    Dizziness    Gastroesophageal reflux disease without esophagitis    History of adenomatous polyp of colon    History of nephrectomy, unilateral    History of renal cell carcinoma    Hyperlipidemia    Irritable bowel syndrome without diarrhea    Moderate persistent asthma    Paroxysmal atrial fibrillation (H)    Primary osteoarthritis involving multiple joints    Sarcoidosis, lung    Seasonal allergies    Transient ischemic attack (TIA)    Paresthesia    Abnormal finding on MRI of brain    Facial paresthesia     NSTEMI (non-ST elevated myocardial infarction) (H)    Atrial fibrillation with RVR (H)    Fall, initial encounter       Physical Exam   Temp: 97.4  F (36.3  C) Temp src: Oral BP: 115/77 Pulse: 75   Resp: 18 SpO2: 95 % O2 Device: Nasal cannula    Vitals:    03/06/25 0142 03/07/25 0540   Weight: 59.9 kg (132 lb) 61.6 kg (135 lb 11.2 oz)     Vital Signs with Ranges  Temp:  [97.4  F (36.3  C)-97.9  F (36.6  C)] 97.4  F (36.3  C)  Pulse:  [67-89] 75  Resp:  [10-25] 18  BP: ()/() 115/77  SpO2:  [94 %-97 %] 95 %  I/O last 3 completed shifts:  In: 240 [P.O.:240]  Out: -     Constitutional: Appears her stated age, well nourished, and in no acute distress.  Eyes: Pupils equal, round. Sclerae anicteric.   HEENT: Normocephalic, atraumatic.   Neck: Supple.   Respiratory: Breathing non-labored. Lungs clear to auscultation bilaterally. No crackles, wheezes, rhonchi, or rales.  Cardiovascular: Regular rate and rhythm, normal S1 and S2. No murmur, rub, or gallop.  GI: Soft, non-distended  Skin: Warm, dry. No rashes, cyanosis, edema, or xanthelasma.  Musculoskeletal/Extremities: Moves all extremities well and symmetrically. No edema. Right radial artery access site wrapped.   Neurologic: No gross focal deficits. Alert, awake, and oriented to person, place and time.  Psychiatric: Affect appropriate. Mentation normal.     Medications   Current Facility-Administered Medications   Medication Dose Route Frequency Provider Last Rate Last Admin    heparin 25,000 units in 0.45% NaCl 250 mL ANTICOAGULANT infusion  0-5,000 Units/hr Intravenous Continuous Kanchan Leonard MD   Stopped at 03/07/25 0836    medication instruction   Does not apply Continuous PRN Kanchan Leonard MD        nitroGLYcerin 50 mg in D5W 250 mL PERIPHERAL IV infusion   mcg/min Intravenous Continuous Kay Mcfarlane MD   Stopped at 03/07/25 0836    Patient is already receiving anticoagulation with heparin, enoxaparin (LOVENOX), warfarin  (COUMADIN)  or other anticoagulant medication   Does not apply Continuous PRN Kanchan Leonard MD        Reason ACE/ARB/ARNI order not selected   Other DOES NOT GO TO Kanchan Ramos MD        Reason beta blocker not prescribed   Does not apply DOES NOT GO TO Kanchan Ramos MD        Reason statin not prescribed   Oral DOES NOT GO TO Kanchan Ramos MD         Current Facility-Administered Medications   Medication Dose Route Frequency Provider Last Rate Last Admin    aspirin (ASA) chewable tablet 81 mg  81 mg Oral Daily Kanchan Leonard MD        atorvastatin (LIPITOR) tablet 10 mg  10 mg Oral Daily Kanchan Leonard MD   10 mg at 03/06/25 1117    diltiazem ER COATED BEADS (CARDIZEM CD/CARTIA XT) 24 hr capsule 120 mg  120 mg Oral BID Kanchan Leonard MD   120 mg at 03/06/25 2003    pantoprazole (PROTONIX) EC tablet 40 mg  40 mg Oral QAM AC Kanchan Leonard MD   40 mg at 03/07/25 0631    sodium chloride (PF) 0.9% PF flush 3 mL  3 mL Intracatheter Q8H Kanchan Leonard MD   3 mL at 03/06/25 2105       Data   Results for orders placed or performed during the hospital encounter of 03/05/25 (from the past 24 hours)   EKG 12-lead, tracing only   Result Value Ref Range    Systolic Blood Pressure  mmHg    Diastolic Blood Pressure  mmHg    Ventricular Rate 90 BPM    Atrial Rate 90 BPM    MI Interval 192 ms    QRS Duration 94 ms     ms    QTc 447 ms    P Axis 64 degrees    R AXIS -23 degrees    T Axis 26 degrees    Interpretation ECG       Sinus rhythm with Premature supraventricular complexes  Septal infarct (cited on or before 05-Mar-2025)  Abnormal ECG  When compared with ECG of 06-Mar-2025 02:30,  (RBBB and left anterior fascicular block) is no longer Present  Questionable change in initial forces of Septal leads  Confirmed by GENERATED REPORT, COMPUTER ([id number]),  Amy Rajan ([id number]) on 3/6/2025 1:25:38 PM     Partial thromboplastin time   Result  Value Ref Range    aPTT 66 (H) 22 - 38 Seconds   Partial thromboplastin time   Result Value Ref Range    aPTT 66 (H) 22 - 38 Seconds   Partial thromboplastin time   Result Value Ref Range    aPTT 63 (H) 22 - 38 Seconds   Cardiac Catheterization   Result Value Ref Range    Cath EF Estimated 35 %    Narrative      The ejection fraction is 30-40% by visual estimate.    Left ventricular filling pressures are normal.    Takostsubo Cardiomyopathy : NSTEMI in setting of emotional stress from   mechanical fall    Findings  LV severe apical ballooning with sparing of base LVEF 35 to 40% . Typical   for Takotsubo    Coronaries  LMCA normal  CX normal   RCA dominant nornmal  LAD minimal atheromatous changes     coronary angiogram    Narrative    Darryl Chris MD     3/7/2025  9:39 AM  [facility]    Procedure: Coronary angiogram    Date/Time: 3/7/2025 9:36 AM    Performed by: Darryl Chris MD  Authorized by: Darryl Chris MD      UNIVERSAL PROTOCOL   Site Marked: Yes  Prior Images Obtained and Reviewed:  Yes  Required items: Required blood products, implants, devices and special   equipment available    Patient identity confirmed:  Verbally with patient  Patient was reevaluated immediately before administering moderate or deep   sedation or anesthesia  Confirmation Checklist:  Patient's identity using two indicators  Time out: Immediately prior to the procedure a time out was called    Universal Protocol: the Joint Commission Universal Protocol was followed    Preparation: Patient was prepped and draped in usual sterile fashion       ANESTHESIA    Anesthesia:  Local infiltration  Local Anesthetic:  Lidocaine 1% without epinephrine      PROCEDURE  Describe Procedure: Procedure  1) CAG 2) LHC  3 LV  Approach RTR  Complications none  Indication NSTEMI PAF   Findings  LM normal  CX normal  RCA normal  LAD minimal atheromatous changes    LV apical dyskinesis sparing base. Typical for  Takotsubo  Assess  Takotsubo cardiomyopathy    Plan  1)  No indication for revascularization  2) May resume apixaban barring bleeding problems at access site  3) medications as tolerated for HFREF  4) repeat TTE in  next 6 to 8 weeks to assess for recovery    [place]  Patient Tolerance:  Patient tolerated the procedure well with no immediate   complications  Length of time physician/provider present for 1:1 monitoring during   sedation: 30       Recent Labs   Lab 03/06/25 0618 03/05/25 2323   WBC 9.3 12.0*   HGB 11.0* 12.1   HCT 32.5* 36.1   MCV 85 86    346          Lab Results   Component Value Date     03/06/2025     03/05/2025     01/02/2025    Lab Results   Component Value Date    CHLORIDE 99 03/06/2025    CHLORIDE 101 03/05/2025    CHLORIDE 100 01/02/2025    CHLORIDE 98 09/13/2024    CHLORIDE 103 01/19/2024    Lab Results   Component Value Date    BUN 15.1 03/06/2025    BUN 17.1 03/05/2025    BUN 16.3 01/02/2025      Lab Results   Component Value Date    POTASSIUM 4.0 03/06/2025    POTASSIUM 4.1 03/05/2025    POTASSIUM 4.2 01/02/2025    Lab Results   Component Value Date    CO2 21 03/06/2025    CO2 21 03/05/2025    CO2 24 01/02/2025    Lab Results   Component Value Date    CR 0.60 03/06/2025    CR 0.58 03/05/2025    CR 0.65 01/02/2025        Recent Labs   Lab 03/06/25  0618 03/05/25  2323   CR 0.60 0.58     Recent Labs   Lab 03/06/25 0618   CHOL 158   HDL 72   LDL 80   TRIG 30        This note was completed in part using Dragon voice recognition software. Although reviewed after completion, some word and grammatical errors may occur.

## 2025-03-07 NOTE — PROGRESS NOTES
"Suring Progress Note     Payam Munroe MD  03/07/2025         Interval History:      Admitted with chest pain concerning for non-ST elevation myocardial infarction.  Coronary angiogram showing normal coronary arteries.  LV gram suggestive of Takotsubo with moderately reduced LV systolic function.  This is confirmed on echocardiogram.       Assessment and Plan:      Stress cardiomyopathy.  Moderately reduced LV systolic function.  Recommend adding Toprol-XL 25 mg daily and losartan 12.5 mg daily.  Discontinue diltiazem due to reduced LVEF.  History of paroxysmal atrial fibrillation.  On apixaban.  Can resume apixaban from tonight if cath site looks okay  Admitted with mechanical fall.  Since patient not a very good historian, some concern could this be a syncopal event.  EKG shows new bifascicular block compared to previous EKG.  No significant worrisome arrhythmia noted like bradycardia or AV block on telemetry so far.  Would recommend 30-day of event monitor at the time of discharge.    Primary cardiologist Dr Mcmillan (EP) and Dr. Louis (general cardiology)    Recommendations  Start Toprol-XL 25 mg daily, losartan 12.5 mg daily  Can restart apixaban to 2.5 mg twice daily from tonight if cath site looks okay without significant bruise or hematoma  Discontinue diltiazem  Recommend 30-day event monitor at discharge.  Cardiology follow-up already arranged on 4 April.  Would recommend repeating echocardiogram in 4 to 6 weeks to see improvement in LVEF  And subsequently follow-up with Dr Mcmillan and Dr. Louis    Cardiology will sign off.  Please feel free to call with any questions.           Physical Exam:       , Blood pressure 117/76, pulse 75, temperature 97.4  F (36.3  C), temperature source Oral, resp. rate 16, height 1.6 m (5' 3\"), weight 61.6 kg (135 lb 11.2 oz), SpO2 95%.  Vitals:    03/06/25 0142 03/07/25 0540   Weight: 59.9 kg (132 lb) 61.6 kg (135 lb 11.2 oz)     Vital Signs with Ranges  Temp:  [97.4  F " (36.3  C)-97.9  F (36.6  C)] 97.4  F (36.3  C)  Pulse:  [67-87] 75  Resp:  [13-19] 16  BP: ()/() 117/76  SpO2:  [94 %-97 %] 95 %  I/O's Last 24 hours  I/O last 3 completed shifts:  In: 240 [P.O.:240]  Out: -          Medications:        Current Facility-Administered Medications   Medication Dose Route Frequency Provider Last Rate Last Admin    apixaban ANTICOAGULANT (ELIQUIS) tablet 2.5 mg  2.5 mg Oral BID Kay Mcfarlane MD        atorvastatin (LIPITOR) tablet 10 mg  10 mg Oral Daily Darryl Chris MD   10 mg at 03/07/25 1214    budesonide (PULMICORT) neb solution 0.5 mg  0.5 mg Nebulization QPM Kay Mcfarlane MD        fluticasone-vilanterol (BREO ELLIPTA) 100-25 MCG/ACT inhaler 1 puff  1 puff Inhalation Daily Kay Mcfarlane MD        And    umeclidinium (INCRUSE ELLIPTA) 62.5 MCG/ACT inhaler 1 puff  1 puff Inhalation Daily Kay Mcfarlane MD        losartan (COZAAR) half-tab 12.5 mg  12.5 mg Oral QPM Mckenna Yuen NP        metoprolol succinate ER (TOPROL XL) 24 hr tablet 25 mg  25 mg Oral Daily Mckenna Yuen NP        pantoprazole (PROTONIX) EC tablet 40 mg  40 mg Oral QAM AC Darryl Chris MD   40 mg at 03/07/25 0631    sodium chloride (PF) 0.9% PF flush 3 mL  3 mL Intracatheter Q8H Darryl Chris MD   3 mL at 03/06/25 2105     PRN Meds:   Current Facility-Administered Medications   Medication Dose Route Frequency Provider Last Rate Last Admin    acetaminophen (TYLENOL) tablet 650 mg  650 mg Oral Q4H PRN Darryl Chris MD   650 mg at 03/07/25 0051    Or    acetaminophen (TYLENOL) Suppository 650 mg  650 mg Rectal Q4H PRN Darryl Chris MD        albuterol (PROVENTIL HFA/VENTOLIN HFA) inhaler  2 puff Inhalation Q4H PRN Darryl Chris MD        atropine injection 0.5 mg  0.5 mg Intravenous Once PRN Darryl Chris MD        calcium carbonate (TUMS) chewable tablet 1,000 mg  1,000 mg Oral 4x Daily PRN Darryl Chris MD         fentaNYL (PF) (SUBLIMAZE) injection 25 mcg  25 mcg Intravenous Q15 Min PRN Darryl Chris MD        flumazenil (ROMAZICON) injection 0.2 mg  0.2 mg Intravenous q1 min prn Darryl Chris MD        HOLD:  Metformin and metformin containing medications if patient received IV contrast with acute kidney injury or severe chronic kidney disease (stage IV or stage V; i.e., eGFR less than 30)   Does not apply HOLD Darryl Chris MD        HOLD: nitroGLYcerin IF   Does not apply HOLD Darryl Chris MD        ipratropium (ATROVENT) 0.02 % neb solution 0.5 mg  0.5 mg Nebulization Q6H PRN Kay Mcfarlane MD        lidocaine (LMX4) cream   Topical Q1H PRN Darryl Chris MD        lidocaine 1 % 0.1-1 mL  0.1-1 mL Other Q1H PRN Darryl Chris MD        medication instruction   Does not apply Continuous PRN Darryl Chris MD        midazolam (VERSED) injection 0.5 mg  0.5 mg Intravenous Q5 Min PRN Darryl Chris MD        naloxone (NARCAN) injection 0.2 mg  0.2 mg Intravenous Q2 Min PRN Darryl Chris MD        Or    naloxone (NARCAN) injection 0.4 mg  0.4 mg Intravenous Q2 Min PRN Darryl Chris MD        Or    naloxone (NARCAN) injection 0.2 mg  0.2 mg Intramuscular Q2 Min PRN Darryl Chris MD        Or    naloxone (NARCAN) injection 0.4 mg  0.4 mg Intramuscular Q2 Min PRN Darryl Chris MD        nitroGLYcerin (NITROSTAT) sublingual tablet 0.4 mg  0.4 mg Sublingual Q5 Min PRN Darryl Chris MD        ondansetron (ZOFRAN ODT) ODT tab 4 mg  4 mg Oral Q6H PRN Darryl Chris MD        Or    ondansetron (ZOFRAN) injection 4 mg  4 mg Intravenous Q6H PRN Darryl Chris MD        oxyCODONE (ROXICODONE) tablet 5 mg  5 mg Oral Q4H PRN Darryl Chris MD        Or    oxyCODONE (ROXICODONE) tablet 10 mg  10 mg Oral Q4H PRN Darryl Chris MD        Patient is already receiving anticoagulation with heparin, enoxaparin  "(LOVENOX), warfarin (COUMADIN)  or other anticoagulant medication   Does not apply Continuous PRN Darryl Chris MD        polyethylene glycol-propylene glycol PF (SYSTANE ULTRA PF) opthalmic solution 1-2 drop  1-2 drop Both Eyes 4x Daily Kay Roper MD        Reason ACE/ARB/ARNI order not selected   Other DOES NOT GO TO Darryl Harper MD        Reason beta blocker not prescribed   Does not apply DOES NOT GO TO Darryl Harper MD        Reason statin not prescribed   Oral DOES NOT GO TO Darryl Harepr MD        senna-docusate (SENOKOT-S/PERICOLACE) 8.6-50 MG per tablet 1 tablet  1 tablet Oral BID PRN Darryl Chris MD        Or    senna-docusate (SENOKOT-S/PERICOLACE) 8.6-50 MG per tablet 2 tablet  2 tablet Oral BID PRN Darryl Chris MD        sodium chloride (PF) 0.9% PF flush 3 mL  3 mL Intracatheter q1 min prn Darryl Chris MD        sodium chloride 0.9% BOLUS 250 mL  250 mL Intravenous Once PRN Darryl Chris MD                Data:      All new lab and imaging data was reviewed.   Recent Labs   Lab Test 03/06/25 0618 03/05/25 2323 01/02/25  0946 12/30/24  1427 09/13/24  1517   WBC 9.3 12.0* 8.7   < >  --    HGB 11.0* 12.1 11.9   < >  --    MCV 85 86 86   < >  --     346 286   < >  --    INR  --   --   --   --  1.0    < > = values in this interval not displayed.      Recent Labs   Lab Test 03/06/25 0618 03/05/25 2323 01/02/25  0946   * 134* 134*   POTASSIUM 4.0 4.1 4.2   CHLORIDE 99 101 100   CO2 21* 21* 24   BUN 15.1 17.1 16.3   CR 0.60 0.58 0.65   ANIONGAP 10 12 10   SHINE 8.4* 9.3 9.1   GLC 91 108* 82     No lab results found.    Invalid input(s): \"TROP\", \"TROPONINIES\"     Payam Vats, MD  3/7/2025  Pager:  [phone]    "

## 2025-03-07 NOTE — PROCEDURES
[facility]    Procedure: Coronary angiogram    Date/Time: 3/7/2025 9:36 AM    Performed by: Darryl Chris MD  Authorized by: Darryl Chris MD      UNIVERSAL PROTOCOL   Site Marked: Yes  Prior Images Obtained and Reviewed:  Yes  Required items: Required blood products, implants, devices and special equipment available    Patient identity confirmed:  Verbally with patient  Patient was reevaluated immediately before administering moderate or deep sedation or anesthesia  Confirmation Checklist:  Patient's identity using two indicators  Time out: Immediately prior to the procedure a time out was called    Universal Protocol: the Joint Commission Universal Protocol was followed    Preparation: Patient was prepped and draped in usual sterile fashion       ANESTHESIA    Anesthesia:  Local infiltration  Local Anesthetic:  Lidocaine 1% without epinephrine      PROCEDURE  Describe Procedure: Procedure  1) CAG 2) LHC  3 LV  Approach RTR  Complications none  Indication NSTEMI PAF   Findings  LM normal  CX normal  RCA normal  LAD minimal atheromatous changes    LV apical dyskinesis sparing base. Typical for Takotsubo  Assess  Takotsubo cardiomyopathy    Plan  1)  No indication for revascularization  2) May resume apixaban barring bleeding problems at access site  3) medications as tolerated for HFREF  4) repeat TTE in  next 6 to 8 weeks to assess for recovery    Aries  Patient Tolerance:  Patient tolerated the procedure well with no immediate complications  Length of time physician/provider present for 1:1 monitoring during sedation: 30

## 2025-03-07 NOTE — PLAN OF CARE
Goal Outcome Evaluation:      Plan of Care Reviewed With: patient, spouse    Overall Patient Progress: no changeOverall Patient Progress: no change    Outcome Evaluation: care coordination following for any needs at discharge.

## 2025-03-07 NOTE — CONSULTS
Care Management Initial Consult    General Information  Assessment completed with: Patient, Spouse or significant other, patient and Torres  Type of CM/SW Visit: Initial Assessment    Primary Care Provider verified and updated as needed: Yes   Readmission within the last 30 days: no previous admission in last 30 days      Reason for Consult: discharge planning  Advance Care Planning: Advance Care Planning Reviewed: questions answered          Communication Assessment  Patient's communication style: spoken language (English or Bilingual)             Cognitive  Cognitive/Neuro/Behavioral: WDL  Level of Consciousness: alert  Arousal Level: opens eyes spontaneously  Orientation: oriented x 4  Mood/Behavior: calm, cooperative  Best Language: 0 - No aphasia  Speech: clear, spontaneous, logical    Living Environment:   People in home: spouse  Torres  Current living Arrangements: apartment      Able to return to prior arrangements: yes       Family/Social Support:  Care provided by: self  Provides care for: no one  Marital Status:   Support system:           Description of Support System: Supportive, Involved         Current Resources:   Patient receiving home care services: No        Community Resources: None  Equipment currently used at home: grab bar, toilet, grab bar, tub/shower  Supplies currently used at home: None    Employment/Financial:  Employment Status: retired        Financial Concerns: none           Does the patient's insurance plan have a 3 day qualifying hospital stay waiver?  No    Lifestyle & Psychosocial Needs:  Social Drivers of Health     Food Insecurity: Low Risk  (12/31/2024)    Food Insecurity     Within the past 12 months, did you worry that your food would run out before you got money to buy more?: No     Within the past 12 months, did the food you bought just not last and you didn t have money to get more?: No   Depression: Not at risk (9/23/2024)    PHQ-2     PHQ-2 Score: 0   Housing  Stability: Low Risk  (12/31/2024)    Housing Stability     Do you have housing? : Yes     Are you worried about losing your housing?: No   Tobacco Use: Low Risk  (9/26/2024)    Received from [employer]    Patient History     Smoking Tobacco Use: Never     Smokeless Tobacco Use: Never     Passive Exposure: Not on file   Financial Resource Strain: Low Risk  (12/31/2024)    Financial Resource Strain     Within the past 12 months, have you or your family members you live with been unable to get utilities (heat, electricity) when it was really needed?: No   Alcohol Use: Not on file   Transportation Needs: Low Risk  (12/31/2024)    Transportation Needs     Within the past 12 months, has lack of transportation kept you from medical appointments, getting your medicines, non-medical meetings or appointments, work, or from getting things that you need?: No   Physical Activity: Not on file   Interpersonal Safety: Not on file   Stress: Not on file   Social Connections: Not on file   Health Literacy: Not on file       Functional Status:  Prior to admission patient needed assistance:   Dependent ADLs:: Independent  Dependent IADLs:: Independent       Mental Health Status:  Mental Health Status: No Current Concerns       Chemical Dependency Status:  Chemical Dependency Status: No Current Concerns             Values/Beliefs:  Spiritual, Cultural Beliefs, Synagogue Practices, Values that affect care: no               Discussed  Partnership in Safe Discharge Planning  document with patient/family: No    Additional Information:  Writer met with patient and her  Torres and introduced self and role.  Patient reports that they live in an independent  [employer] building with an elevator. Patient is independent with all A/IADLs at baseline.     Patient already has cardiology follow up arranged and she says she has a PCP appt on Monday, March 10th.    Next Steps: Care coordination team will continue to follow and assist with any  needs at discharge.    Stacy Elizabeth RN Care Coordinator  [facility]  [phone]

## 2025-03-07 NOTE — PLAN OF CARE
Neuro:A&O x4  Tele/cardiac: SR with inverted T wave  Respiration: RA  Activity: SBA  Pain: Tylenol for headache   Drips: heparin and nitro  Drains/tubes: none  Skin: scattered bruising  GI/: continent  Aggression color: green  Isolation: none  Plan for RHC today

## 2025-03-07 NOTE — PLAN OF CARE
Goal Outcome Evaluation:      Plan of Care Reviewed With: patient  Patient alert, SBA walker ,up to BR.Returned from angio this am with Right radial approach. Site mildly ecchymotic, no change wnl.  No sig findings, echo decreased 35-40%, possible Tako Subo. Start metoprolol. Discontinue dilt. Denies pain, SOB, VSS. SR. Probable discharge tomorrow.

## 2025-03-07 NOTE — PLAN OF CARE
"Goal Outcome Evaluation: Patient Name: Ruba  MRN: [id number]  Date of Admission: 3/5/2025  Reason for Admission: CP and fall at home  Level of Care: [facility]    Vitals:   BP Readings from Last 1 Encounters:   03/06/25 96/59     Pulse Readings from Last 1 Encounters:   03/06/25 75     Wt Readings from Last 1 Encounters:   03/06/25 59.9 kg (132 lb)     Ht Readings from Last 1 Encounters:   03/06/25 1.6 m (5' 3\")     Estimated body mass index is 23.38 kg/m  as calculated from the following:    Height as of this encounter: 1.6 m (5' 3\").    Weight as of this encounter: 59.9 kg (132 lb).  Temp Readings from Last 1 Encounters:   03/06/25 97.8  F (36.6  C) (Oral)       Pain: Pain goal - no pain Pain Rating 4-5/10 headache. Effective pain medication/regimen - yes    CV Surgery Patient: No    Assessment    Resp: RA, LS diminished  Telemetry: SR  Neuro: A&Ox4  GI/: voiding, +BM today  Skin/Wounds: bump on R head  Lines/Drains: X 2 PIV, Nitroglycerine 10 mcg/3 ml/hr, Heparin 700 unis/hr  Activity: Up with SBA  Sleep: no issues  Abnormal Labs:     Aggression Stop Light: Green          Patient Care Plan: Keep NPO for RHC in AM.       Plan of Care Reviewed With: patient    Overall Patient Progress: no changeOverall Patient Progress: no change           "

## 2025-03-07 NOTE — PRE-PROCEDURE
GENERAL PRE-PROCEDURE:   Procedure:  Coronary angiogram, possible percutaneous coronary intervention  Date/Time:  3/7/2025 8:51 AM    Verbal consent obtained?: Yes    Written consent obtained?: Yes    Risks and benefits: Risks, benefits and alternatives were discussed    DC Plan: Appropriate discharge home plan in place for patients who are going home after procedure   Consent given by:  Patient  Patient states understanding of procedure being performed: Yes    Patient's understanding of procedure matches consent: Yes    Procedure consent matches procedure scheduled: Yes    Expected level of sedation:  Moderate  Appropriately NPO:  Yes  ASA Class:  2  Mallampati  :  Grade 2- soft palate, base of uvula, tonsillar pillars, and portion of posterior pharyngeal wall visible  Lungs:  Lungs clear with good breath sounds bilaterally  Heart:  Normal heart sounds and rate  Statement of review:  I have reviewed the lab findings, diagnostic data, medications, and the plan for sedation  I have examined the patient, reviewed the history, medications and pre procedural tests. 79 year old woman with a history of PAF on apixaban 2.5 bid , cerebrovascular disease, old history of renal cell CA sp nephrectomy ( normal creatinine) RBBB./LAFB who suffered a mechanical fall ( CT shows no bleed), and complained of chest pressure with elevated troponin level. TTE pending ( last was normal 12/2024). She complains of severe lightheadedness last evening without focal neurologic deficit while on NTG.I have explained to the patient the risks of death, MI, stroke, hematoma, possible urgent bypass surgery for failed PCI, use of stents, thienopyridine agents, possible peripheral vascular complications, arrhythmia, the use of FFR in clinical decision-making and alternative of medical therapy alone in regards to left heart catheterization, left ventriculography, coronary angiography, and possible percutaneous coronary intervention. The patient voiced  understanding and wishes to proceed. The patient has a good right radial pulse, normal ulnar pulse and a normal Jose's sign.

## 2025-03-07 NOTE — PROVIDER NOTIFICATION
Dizzy when up from bed, still w/ 3/10 CP on nitroglycerin ggt. BP  systolic.    500 ml NS bolus.     HOB down    If still symptomatic, can downtitrate nitroglycerin and reassess symptoms including CP    Leonidas Oliver MD  1:44 AM

## 2025-03-08 ENCOUNTER — APPOINTMENT (OUTPATIENT)
Dept: PHYSICAL THERAPY | Facility: CLINIC | Age: 80
DRG: 281 | End: 2025-03-08
Attending: INTERNAL MEDICINE
Payer: COMMERCIAL

## 2025-03-08 VITALS
HEIGHT: 63 IN | DIASTOLIC BLOOD PRESSURE: 94 MMHG | OXYGEN SATURATION: 96 % | BODY MASS INDEX: 24.04 KG/M2 | TEMPERATURE: 97.7 F | HEART RATE: 93 BPM | WEIGHT: 135.7 LBS | RESPIRATION RATE: 16 BRPM | SYSTOLIC BLOOD PRESSURE: 128 MMHG

## 2025-03-08 PROCEDURE — 97161 PT EVAL LOW COMPLEX 20 MIN: CPT | Mod: GP

## 2025-03-08 PROCEDURE — 250N000013 HC RX MED GY IP 250 OP 250 PS 637: Performed by: NURSE PRACTITIONER

## 2025-03-08 PROCEDURE — 250N000013 HC RX MED GY IP 250 OP 250 PS 637: Performed by: INTERNAL MEDICINE

## 2025-03-08 PROCEDURE — 97110 THERAPEUTIC EXERCISES: CPT | Mod: GP

## 2025-03-08 PROCEDURE — 99239 HOSP IP/OBS DSCHRG MGMT >30: CPT | Performed by: INTERNAL MEDICINE

## 2025-03-08 PROCEDURE — 97530 THERAPEUTIC ACTIVITIES: CPT | Mod: GP

## 2025-03-08 PROCEDURE — 250N000013 HC RX MED GY IP 250 OP 250 PS 637: Performed by: HOSPITALIST

## 2025-03-08 PROCEDURE — 250N000009 HC RX 250: Performed by: INTERNAL MEDICINE

## 2025-03-08 RX ORDER — NALOXONE HYDROCHLORIDE 0.4 MG/ML
0.2 INJECTION, SOLUTION INTRAMUSCULAR; INTRAVENOUS; SUBCUTANEOUS
Status: DISCONTINUED | OUTPATIENT
Start: 2025-03-08 | End: 2025-03-08 | Stop reason: HOSPADM

## 2025-03-08 RX ORDER — NALOXONE HYDROCHLORIDE 0.4 MG/ML
0.4 INJECTION, SOLUTION INTRAMUSCULAR; INTRAVENOUS; SUBCUTANEOUS
Status: DISCONTINUED | OUTPATIENT
Start: 2025-03-08 | End: 2025-03-08 | Stop reason: HOSPADM

## 2025-03-08 RX ORDER — LOSARTAN POTASSIUM 25 MG/1
12.5 TABLET ORAL EVERY EVENING
Qty: 30 TABLET | Refills: 2 | Status: ON HOLD | OUTPATIENT
Start: 2025-03-08 | End: 2025-03-13

## 2025-03-08 RX ORDER — METOPROLOL SUCCINATE 25 MG/1
25 TABLET, EXTENDED RELEASE ORAL DAILY
Qty: 30 TABLET | Refills: 2 | Status: SHIPPED | OUTPATIENT
Start: 2025-03-09

## 2025-03-08 RX ORDER — ALBUTEROL SULFATE 0.83 MG/ML
2.5 SOLUTION RESPIRATORY (INHALATION) EVERY 4 HOURS PRN
Status: DISCONTINUED | OUTPATIENT
Start: 2025-03-08 | End: 2025-03-08 | Stop reason: HOSPADM

## 2025-03-08 RX ADMIN — METOPROLOL SUCCINATE 25 MG: 25 TABLET, EXTENDED RELEASE ORAL at 08:52

## 2025-03-08 RX ADMIN — ALBUTEROL SULFATE 2.5 MG: 2.5 SOLUTION RESPIRATORY (INHALATION) at 05:48

## 2025-03-08 RX ADMIN — ATORVASTATIN CALCIUM 10 MG: 10 TABLET, FILM COATED ORAL at 08:52

## 2025-03-08 RX ADMIN — APIXABAN 2.5 MG: 2.5 TABLET, FILM COATED ORAL at 08:52

## 2025-03-08 ASSESSMENT — ACTIVITIES OF DAILY LIVING (ADL)
ADLS_ACUITY_SCORE: 61
ADLS_ACUITY_SCORE: 59
ADLS_ACUITY_SCORE: 61
ADLS_ACUITY_SCORE: 61
ADLS_ACUITY_SCORE: 59
ADLS_ACUITY_SCORE: 61
ADLS_ACUITY_SCORE: 59
ADLS_ACUITY_SCORE: 61
ADLS_ACUITY_SCORE: 61

## 2025-03-08 NOTE — PLAN OF CARE
Goal Outcome Evaluation:      Plan of Care Reviewed With: patient  Pt feeling well. Passed cardiac rehab. Rec'd discharge orders, Pt will  meds at her pharm. Review new and change of meds. Pt has understanding of follow up.  will transport home.

## 2025-03-08 NOTE — CARE PLAN
OT: Orders rec'd. Patient evaluated by PT who reports patient is IND with dressing, transfers and amb. No cognitive concern. PT will continue to see to address cardiac rehab. OT eval not indicated. Order complete.

## 2025-03-08 NOTE — PROGRESS NOTES
Cardiac Rehab Discharge Summary    Reason for therapy discharge:    Discharged to home with outpatient therapy.    Progress towards therapy goal(s). See goals on Care Plan in [facility] electronic health record for goal details.  Goals partially met.  Barriers to achieving goals:   discharge from facility and discharge on same date as initial evaluation.    Therapy recommendation(s):    Continued therapy is recommended.  Rationale/Recommendations:  outpatient cardiac rehab phase II for monitored progression of activity and education to promote overall heart health.         03/08/25 0872   Appointment Info   Signing Clinician's Name / Credentials (PT) Patti Gonzalez, PT, DPT   Living Environment   People in Home spouse   Current Living Arrangements apartment   Home Accessibility no concerns   Transportation Anticipated car, drives self;family or friend will provide   Living Environment Comments Pt lives at [address] [place] [address], has elevator access but does often use the stairs for exercise   Self-Care   Usual Activity Tolerance good   Current Activity Tolerance moderate   Regular Exercise Yes   Activity/Exercise Type walking   Exercise Amount/Frequency 3-5 times/wk   Equipment Currently Used at Home none   Fall history within last six months yes   Number of times patient has fallen within last six months 1   Activity/Exercise/Self-Care Comment Pt with one fall leading up to hospital stay, unclear if pt fell on ice or due to cardiac event. At baseline pt is independent without use of assistive device, walks 2 miles a day around the building, sometimes goes to the gym but has had decreased activity tolerance the last few weeks.   General Information   Onset of Illness/Injury or Date of Surgery 03/05/25   Referring Physician Darryl Chris MD   Patient/Family Therapy Goals Statement (PT) To go home   Pertinent History of Current Problem (include personal factors and/or comorbidities that impact the POC) Pt is 79 year old female  adm on 3/5/25 for evaluation after a fall, was found to have elevated troponin suggestive of NSTEMI. Pt to cath lab on 3/7/25 with no intervention, findings suggestive of Takotsubo cardiomyopathy. Pt did have two episodes of chest tightness and shortness of breath after angio but attributes these to asthma, symptoms relieved after nebulizer treatment. PMH includes HTN, paroxysmal a-fib on Eliquis, dyslipidemia, TIA, GERD, renal cell carcinoma s/p right nephrectomy, CKD stage II, asthma.   Existing Precautions/Restrictions no known precautions/restrictions   Cognition   Affect/Mental Status (Cognition) WFL   Orientation Status (Cognition) oriented x 4   Follows Commands (Cognition) WFL   Pain Assessment   Patient Currently in Pain No   Integumentary/Edema   Integumentary/Edema no deficits were identifed   Posture    Posture Forward head position   Range of Motion (ROM)   Range of Motion ROM is WFL   Strength (Manual Muscle Testing)   Strength (Manual Muscle Testing) strength is WFL   Bed Mobility   Bed Mobility no deficits identified   Transfers   Transfers no deficits identified   Gait/Stairs (Locomotion)   [place] Level (Gait) independent   Balance   Balance Comments No LOB during session, pt moves carefully and is cautious, demonstrates good overall safety awareness   Clinical Impression   Criteria for Skilled Therapeutic Intervention Yes, treatment indicated   PT Diagnosis (PT) Impaired activity tolerance   Influenced by the following impairments Decreased endurance   Functional limitations due to impairments Decreased ability to participate in daily tasks   Clinical Presentation (PT Evaluation Complexity) stable   Clinical Presentation Rationale Current presentation, PM   Clinical Decision Making (Complexity) low complexity   Planned Therapy Interventions (PT) progressive activity/exercise;risk factor education;home program guidelines   Risk & Benefits of therapy have been explained evaluation/treatment  results reviewed;care plan/treatment goals reviewed;risks/benefits reviewed;current/potential barriers reviewed;participants voiced agreement with care plan;participants included;patient   PT Total Evaluation Time   PT Eval, Low Complexity Minutes (85927) 10   Physical Therapy Goals   PT Frequency Daily   PT Predicted Duration/Target Date for Goal Attainment 03/15/25   PT Goals Cardiac Phase 1   PT: Understanding of cardiac education to maximize quality of life, condition management, and health outcomes Patient;Verbalize;Demonstrate   PT: Perform aerobic activity with stable cardiovascular response continuous;15 minutes;ambulation;treadmill   PT: Functional/aerobic ambulation tolerance with stable cardiovascular response in order to return to home and community environment Independent;Greater than 300 feet   PT: Navigation of stairs simulating home set up with stable cardiovascular response in order to return to home and community environment Independent;Greater than 10 stairs   Interventions   Interventions Quick Adds Therapeutic Activity;Therapeutic Procedure;Cardiac Rehab   Therapeutic Procedure/Exercise   Ther. Procedure: strength, endurance, ROM, flexibillity Minutes (14563) 8   Symptoms Noted During/After Treatment none   Treatment Detail/Skilled Intervention Education provided on benefits of exercise and signs/symptoms of intolerance   Treatment Time Includes (CR Only) See specific exercise details intervention group(s);Monitoring of vital signs (see vital signs flowsheet for details)   Therapeutic Activity   Therapeutic Activities: dynamic activities to improve functional performance Minutes (90703) 15   Symptoms Noted During/After Treatment None   Treatment Detail/Skilled Intervention Pt sitting upright in bed on arrival, agreeable to participate. Pt demonstrates good knowledge of wrist precautions after angio, is able to complete mobility tasks within these precautions. Pt demonstrates good overall safety  awareness, does endorse some dizziness with ambulation initially but improved during session. Time spent in education especially on safely increasing activity after discharge as pt was quite active prior to admission. Handouts provided and reviewed. OP CR phase II orders placed as pt would benefit from this program if improved by insurance. At completion of session pt sitting upright in bed, eating breakfast, needs within reach.   Ambulation   Workload In room, no device (pt declines hallway ambulation at this time due to not being dressed)   Duration (minutes) 5 minutes   Effort Scale 2   Symptoms Dizziness   Cardiovascular Response Normal   Exercise Details Pt ambulates with steady gait, denies any symptoms except some dizziness initially but improved during session   Vital Signs Details stable, see VSFS   Cardiac Education   Education Provided Home exercise program;OMNI Scale;Outpatient Cardiac Rehab;Precautions;Resuming home activities;Signs and symptoms;Stop light tool   Education Packet Given to Patient Yes   All Patient Education Handouts Reviewed with Patient and/or Family Yes   Cardiac Rehab Phase II Plan   Phase II Order Received Yes   Phase II Appointment Status   (not yet scheduled due to weekend)   PT Discharge Planning   PT Plan Pt likely discharging to home today, will continue to follow for education and monitored progression of activity if discharge is held   PT Discharge Recommendation (DC Rec) home with outpatient cardiac rehab   PT Rationale for DC Rec Pt can perform mobility necessary for discharge to home, was quite active prior to admission and would benefit from outpatient cardiac rehab for monitored progression of activity and education to promote overall heart health.   PT Brief overview of current status Goals of therapy will be to address safe mobility and make recs for d/c to next level of care. Pt and RN will continue to follow all falls risk precautions as documented by RN staff while  hospitalized   PT Total Distance Amb During Session (feet) 200   Physical Therapy Time and Intention   Timed Code Treatment Minutes 23   Total Session Time (sum of timed and untimed services) 33

## 2025-03-08 NOTE — DISCHARGE SUMMARY
[facility]  Hospitalist Discharge Summary      Date of Admission:  3/5/2025  Date of Discharge:  3/8/2025  Discharging Provider: Alexis Ramos DO  Discharge Service: Hospitalist Service    Discharge Diagnoses   NSTEMI  Possible Takotsubo's cardiomyopathy  Fall  Hypertension  History of paroxysmal A-fib  History of dyslipidemia  h/o CVA/TIA  History of asthma   GERD  History of sarcoidosis, lung    Clinically Significant Risk Factors          Follow-ups Needed After Discharge   Follow-up Appointments       Follow Up      ----------------------  Patient already schedule follow up with her primary provider for Monday, March 10th.        Follow Up      Follow up with cardiology on 4/4/2025        Hospital Follow-up with Existing Primary Care Provider (PCP)      Please see details below         Schedule Primary Care visit within: 7 Days           Follow-up with PCP for posthospital evaluation within 2 weeks    Follow-up with cardiology 4/4//2025 for ongoing management of stress cardiomyopathy    Discharge Disposition   Discharged to home  Condition at discharge: Stable    Hospital Course   Margareth Hogue is a 79 year old female with a history of atrial fibrillation, CVA, sarcoidosis who was admitted to [facility] from 3/5/2025 - 3/8/2025 after a mechanical ground-level fall.  Etiology of fall likely secondary to slipping on ice per patient report, but given somewhat inconsistent history, cannot rule out arrhythmia.  Will have Holter monitor sent on discharge.  Hospital course complicated by NSTEMI prompting angiogram 3/7/2025 demonstrating apical wall ballooning consistent with Takotsubo's cardiomyopathy.  She will be sent home with metoprolol XL 25 mg daily (diltiazem discontinued), losartan 12.5 mg daily for medical optimization of stress cardiomyopathy with outpatient cardiology follow-up 4/4/2025 and repeat echocardiogram in 4-6 weeks.  She will also follow-up with her PCP for  posthospital evaluation.  On day of discharge, she was tolerating her meals and medications without adverse effects.  All questions by patient answered by me personally.  Stable for discharge home.    Consultations This Hospital Stay   PHARMACY IP CONSULT  CARDIAC REHAB IP CONSULT  CARDIOLOGY IP CONSULT  PHARMACY IP CONSULT  CARE MANAGEMENT / SOCIAL WORK IP CONSULT  PHYSICAL THERAPY ADULT IP CONSULT  OCCUPATIONAL THERAPY ADULT IP CONSULT  SMOKING CESSATION PROGRAM IP CONSULT  SMOKING CESSATION PROGRAM IP CONSULT    Code Status   Full Code    Time Spent on this Encounter   I, Alexis Ramos DO, personally saw the patient today and spent greater than 30 minutes discharging this patient.       Alexis Ramos DO  [facility]  [address]  [place] [place] [facility] [address]  Phone: [phone]  ______________________________________________________________________    Physical Exam   Vital Signs: Temp: 97.7  F (36.5  C) Temp src: Oral BP: (!) 128/94 Pulse: 93   Resp: 16 SpO2: 96 % O2 Device: Nasal cannula Oxygen Delivery: 1 LPM  Weight: 135 lbs 11.2 oz  Constitutional:       Appearance: Normal appearance.   Cardiovascular:      Rate and Rhythm: Normal rate and regular rhythm.      Pulses: Normal pulses.      Heart sounds: Normal heart sounds.   Pulmonary:      Effort: Pulmonary effort is normal. No respiratory distress.      Breath sounds: Normal breath sounds.   Abdominal:      General: Abdomen is flat. Bowel sounds are normal. There is no distension.      Tenderness: There is no abdominal tenderness. There is no guarding.   Skin:     General: Skin is warm and dry.   Neurological:      General: No focal deficit present.       Primary Care Physician   Nadira Rob    Discharge Orders      Follow-Up with Cardiology ANTONY      Cardiac Rehab  Referral      Medication Instructions - Anticoagulants    Do NOT stop your aspirin or platelet inhibitor unless directed by your  Cardiologist.  These medications help to prevent platelets in your blood from sticking together and forming a clot.  Examples of these medications are:  Ticagrelor (Brilinta), Clopidigrel (Plavix), Prasugrel (Effient)     When to call - Contact the Heart Clinic    You may experience symptoms that require follow-up before your scheduled appointment. Contact the Heart Clinic if you develop: Fever over 100.4o Fahrenheit, that lasts more than one day; Redness, heat, or pus at the puncture site; Change in color or temperature in your hand or arm.     When to call - Reasons to Call 911    If your wrist puncture site starts bleeding after discharge, sit down and apply firm pressure with your thumb against the puncture site and fingers against the back of the wrist for 10 minutes. If the bleeding stops, continue to rest, keeping your wrist still for 2 hours. Notify your doctor as soon as possible.  IF BLEEDING DOES NOT STOP OR THERE IS A LARGER AMOUNT OF BLEEDING OR SPURTING CALL 9-1-1 immediately.DO NOT drive yourself to the hospital.     Precautions - Lifting    DO NOT lift more than 5 pounds with affected arm for 48 hours     Precautions - Household Activities    Avoid excessive bending or movement of your wrist for 72 hours.  Do not subject hand/arm to any forceful movements for 24 hours, such as supporting weight when rising from a chair or bed.     Remove the band-aid on the puncture site after 24 hours and leave open to air. If minor oozing, you may apply a band-aid and remove after 12 hours.     Precautions - Active Sports Activities    DO NOT engage in vigorous exercise using your affected arm for 3 days after discharge.  This includes golf, tennis or swimming.     Precautions - Operating yard equipment or vehicles    Do not operate a chainsaw, lawnmower, motorcycle, or all-terrain vehicle for 48 hours after the procedure.     Precautions - Elective Dental Work    NO elective dental work for 6 weeks after receiving  a stent.     Comfort and Pain Management - Bruising after Surgery    Expect mild tingling of hand and tenderness at the wrist puncture site for up to 3 days. You may take Tylenol or a pain medicine recommended by your doctor.     Activity - Cardiac Rehab    You are encouraged to enroll in an Outpatient Cardiac Rehab program after discharge from the hospital.  Our Cardiac Rehab staff may visit briefly with you while you're in the hospital.  If they miss you, someone will contact you after you are home.     Return to Driving    Driving is NOT permitted for 24 hours after surgery     Return to work    You may return to work after 72 hours if you are feeling well and your job does not involve heavy lifting.     Shower / Bathing    You may shower on the day after your procedure.  DO NOT soak of wrist with the puncture site in water for 3 days to prevent infection. DO NOT take a tub bath or wash dishes for 3 days after the procedure     Dressing Removal    Remove the band-aid on the puncture site after 24 hours and leave open to air. If minor oozing, you may apply a band-aid and remove after 12 hours     Follow Up    ----------------------  Patient already schedule follow up with her primary provider for Monday, March 10th.     Reason for your hospital stay    FALL     Activity    Your activity upon discharge: activity as tolerated     Follow Up    Follow up with cardiology on 4/4/2025     Full Code     Adult Cardiac Event Monitor     Echocardiogram Complete    Administration of IV contrast will be tailored to this examination per the appropriate written protocol listed in the Echocardiography department Protocol Book, or by the supervising Cardiologist. This may result in an order change.    Use of contrast is at the discretion of the supervising Cardiologist.     Diet    Follow this diet upon discharge: Current Diet:Orders Placed This Encounter      Low Saturated Fat Na <2400 mg     Hospital Follow-up with Existing  Primary Care Provider (PCP)    Please see details below            Significant Results and Procedures   Reviewed by me    Discharge Medications   Current Discharge Medication List        START taking these medications    Details   losartan (COZAAR) 25 MG tablet Take 0.5 tablets (12.5 mg) by mouth every evening.  Qty: 30 tablet, Refills: 2    Associated Diagnoses: Takotsubo cardiomyopathy      metoprolol succinate ER (TOPROL XL) 25 MG 24 hr tablet Take 1 tablet (25 mg) by mouth daily.  Qty: 30 tablet, Refills: 2    Associated Diagnoses: Takotsubo cardiomyopathy           CONTINUE these medications which have NOT CHANGED    Details   albuterol (PROAIR HFA/PROVENTIL HFA/VENTOLIN HFA) 108 (90 Base) MCG/ACT inhaler Inhale 2 puffs into the lungs every 4 hours as needed      apixaban ANTICOAGULANT (ELIQUIS) 2.5 MG tablet Take 2.5 mg by mouth 2 times daily.      atorvastatin (LIPITOR) 10 MG tablet Take 1 tablet (10 mg) by mouth daily.  Qty: 90 tablet, Refills: 3    Associated Diagnoses: Hyperlipidemia LDL goal <130      budesonide (PULMICORT) 0.5 MG/2ML neb solution Take 0.5 mg by nebulization every evening.      calcium carbonate (OS-SHINE) 1500 (600 Ca) MG tablet Take 600 mg by mouth daily.      cetirizine (ZYRTEC) 10 MG tablet Take 10 mg by mouth every evening.      conjugated estrogens (PREMARIN) 0.625 MG/GM vaginal cream Place vaginally daily as needed.      docusate sodium (DSS) 100 MG capsule Take 1 capsule by mouth every evening.      fish oil-omega-3 fatty acids 1000 MG capsule Take 1 g by mouth 2 times daily.      fluticasone (FLONASE) 50 MCG/ACT nasal spray Spray 1 spray into both nostrils 2 times daily.      ipratropium (ATROVENT) 0.02 % neb solution Inhale 0.5 mg into the lungs every 6 hours as needed for wheezing.      ketoconazole (NIZORAL) 2 % external cream Apply topically daily. To feet      omeprazole (PRILOSEC) 20 MG DR capsule Take 20 mg by mouth daily as needed.      polyethylene glycol (MIRALAX) 17 g  packet Take 1 packet by mouth daily as needed for constipation.      polyethylene glycol-propylene glycol (SYSTANE ULTRA) 0.4-0.3 % SOLN ophthalmic solution Place 1-2 drops into both eyes 4 times daily as needed for dry eyes.      sodium chloride (OCEAN) 0.65 % nasal spray Spray 1 spray into both nostrils 2 times daily.      TRELEGY ELLIPTA 100-62.5-25 MCG/ACT oral inhaler Inhale 2 puffs into the lungs daily           STOP taking these medications       diltiazem ER (TIAZAC) 120 MG 24 hr ER beaded capsule Comments:   Reason for Stopping:             Allergies   Allergies   Allergen Reactions    Sulfa Antibiotics Unknown     Long time ago, she cannot remember    Diazepam Other (See Comments)     Makes more anxious    Meperidine Nausea and Vomiting and Unknown    Morphine Nausea and Unknown    Penicillins Itching    Zafirlukast      She does not remember

## 2025-03-08 NOTE — PLAN OF CARE
Patient is alert and oriented x4, forgetful. VSS on RA. Tele is SR w/ T wave inversion. RRT was called for SOB and chest pain/discomfort around 2300. Patient believes it was asthmas. Neb administered, see MAR. Patient was upset this morning because nebulizer was not available prn. Voiding adequately. R radial angio site slightly ecchymotic, CDI. Continue with plan of care.    Aggression Stop Light: Green

## 2025-03-10 ENCOUNTER — PATIENT OUTREACH (OUTPATIENT)
Dept: CARE COORDINATION | Facility: CLINIC | Age: 80
End: 2025-03-10
Payer: COMMERCIAL

## 2025-03-10 NOTE — PROGRESS NOTES
[facility]    Background: Transitional Care Management program identified per system criteria and reviewed by [facility] Resource [place] team for possible outreach.    Assessment: Upon chart review, CCR Team member will not proceed with patient outreach related to this episode of Transitional Care Management program due to reason below:    Patient has active communication with a nurse, provider or care team for reason of post-hospital follow up plan.  Outreach call by CCRC team not indicated to minimize duplicative efforts.     Plan: Transitional Care Management episode addressed appropriately per reason noted above.      Alejandra Thomas MA  [facility] Care [facility]    *Connected Care Resource Team does NOT follow patient ongoing. Referrals are identified based on internal discharge reports and the outreach is to ensure patient has an understanding of their discharge instructions.

## 2025-03-11 ENCOUNTER — HOSPITAL ENCOUNTER (OUTPATIENT)
Dept: CARDIAC REHAB | Facility: CLINIC | Age: 80
Discharge: HOME | End: 2025-03-11
Attending: INTERNAL MEDICINE
Payer: COMMERCIAL

## 2025-03-11 DIAGNOSIS — I51.81 TAKOTSUBO CARDIOMYOPATHY: ICD-10-CM

## 2025-03-11 PROCEDURE — 93797 PHYS/QHP OP CAR RHAB WO ECG: CPT

## 2025-03-11 PROCEDURE — 93798 PHYS/QHP OP CAR RHAB W/ECG: CPT

## 2025-03-12 ENCOUNTER — HOSPITAL ENCOUNTER (OUTPATIENT)
Facility: CLINIC | Age: 80
Setting detail: OBSERVATION
Discharge: HOME | End: 2025-03-13
Attending: STUDENT IN AN ORGANIZED HEALTH CARE EDUCATION/TRAINING PROGRAM
Payer: COMMERCIAL

## 2025-03-12 ENCOUNTER — APPOINTMENT (OUTPATIENT)
Dept: GENERAL RADIOLOGY | Facility: CLINIC | Age: 80
End: 2025-03-12
Attending: STUDENT IN AN ORGANIZED HEALTH CARE EDUCATION/TRAINING PROGRAM
Payer: COMMERCIAL

## 2025-03-12 DIAGNOSIS — R07.9 CHEST PAIN, UNSPECIFIED TYPE: ICD-10-CM

## 2025-03-12 DIAGNOSIS — I50.20 HEART FAILURE WITH REDUCED EJECTION FRACTION, NYHA CLASS I (H): Primary | ICD-10-CM

## 2025-03-12 LAB
ANION GAP SERPL CALCULATED.3IONS-SCNC: 10 MMOL/L (ref 7–15)
ATRIAL RATE - MUSE: 77 BPM
ATRIAL RATE - MUSE: 78 BPM
BASOPHILS # BLD AUTO: 0 10E3/UL (ref 0–0.2)
BASOPHILS NFR BLD AUTO: 0 %
BUN SERPL-MCNC: 13.3 MG/DL (ref 8–23)
CALCIUM SERPL-MCNC: 8.9 MG/DL (ref 8.8–10.4)
CHLORIDE SERPL-SCNC: 99 MMOL/L (ref 98–107)
CREAT SERPL-MCNC: 0.65 MG/DL (ref 0.51–0.95)
DIASTOLIC BLOOD PRESSURE - MUSE: NORMAL MMHG
DIASTOLIC BLOOD PRESSURE - MUSE: NORMAL MMHG
EGFRCR SERPLBLD CKD-EPI 2021: 89 ML/MIN/1.73M2
EOSINOPHIL # BLD AUTO: 0.1 10E3/UL (ref 0–0.7)
EOSINOPHIL NFR BLD AUTO: 2 %
ERYTHROCYTE [DISTWIDTH] IN BLOOD BY AUTOMATED COUNT: 13.7 % (ref 10–15)
FLUAV RNA SPEC QL NAA+PROBE: NEGATIVE
FLUBV RNA RESP QL NAA+PROBE: NEGATIVE
GLUCOSE SERPL-MCNC: 137 MG/DL (ref 70–99)
HCO3 SERPL-SCNC: 21 MMOL/L (ref 22–29)
HCT VFR BLD AUTO: 33.8 % (ref 35–47)
HGB BLD-MCNC: 11.1 G/DL (ref 11.7–15.7)
HOLD SPECIMEN: NORMAL
IMM GRANULOCYTES # BLD: 0 10E3/UL
IMM GRANULOCYTES NFR BLD: 0 %
INTERPRETATION ECG - MUSE: NORMAL
INTERPRETATION ECG - MUSE: NORMAL
LYMPHOCYTES # BLD AUTO: 1.4 10E3/UL (ref 0.8–5.3)
LYMPHOCYTES NFR BLD AUTO: 23 %
MCH RBC QN AUTO: 28.5 PG (ref 26.5–33)
MCHC RBC AUTO-ENTMCNC: 32.8 G/DL (ref 31.5–36.5)
MCV RBC AUTO: 87 FL (ref 78–100)
MONOCYTES # BLD AUTO: 0.4 10E3/UL (ref 0–1.3)
MONOCYTES NFR BLD AUTO: 7 %
NEUTROPHILS # BLD AUTO: 4.1 10E3/UL (ref 1.6–8.3)
NEUTROPHILS NFR BLD AUTO: 67 %
NRBC # BLD AUTO: 0 10E3/UL
NRBC BLD AUTO-RTO: 0 /100
NT-PROBNP SERPL-MCNC: 4754 PG/ML (ref 0–1800)
P AXIS - MUSE: 57 DEGREES
P AXIS - MUSE: 63 DEGREES
PLATELET # BLD AUTO: 291 10E3/UL (ref 150–450)
POTASSIUM SERPL-SCNC: 3.9 MMOL/L (ref 3.4–5.3)
PR INTERVAL - MUSE: 170 MS
PR INTERVAL - MUSE: 170 MS
QRS DURATION - MUSE: 100 MS
QRS DURATION - MUSE: 98 MS
QT - MUSE: 382 MS
QT - MUSE: 398 MS
QTC - MUSE: 432 MS
QTC - MUSE: 453 MS
R AXIS - MUSE: 0 DEGREES
R AXIS - MUSE: 82 DEGREES
RBC # BLD AUTO: 3.89 10E6/UL (ref 3.8–5.2)
RSV RNA SPEC NAA+PROBE: NEGATIVE
SARS-COV-2 RNA RESP QL NAA+PROBE: NEGATIVE
SODIUM SERPL-SCNC: 130 MMOL/L (ref 135–145)
SYSTOLIC BLOOD PRESSURE - MUSE: NORMAL MMHG
SYSTOLIC BLOOD PRESSURE - MUSE: NORMAL MMHG
T AXIS - MUSE: -43 DEGREES
T AXIS - MUSE: 68 DEGREES
TROPONIN T SERPL HS-MCNC: 41 NG/L
TROPONIN T SERPL HS-MCNC: 41 NG/L
VENTRICULAR RATE- MUSE: 77 BPM
VENTRICULAR RATE- MUSE: 78 BPM
WBC # BLD AUTO: 6 10E3/UL (ref 4–11)

## 2025-03-12 PROCEDURE — 93005 ELECTROCARDIOGRAM TRACING: CPT | Mod: 76

## 2025-03-12 PROCEDURE — 999N000040 HC STATISTIC CONSULT NO CHARGE VASC ACCESS

## 2025-03-12 PROCEDURE — 71046 X-RAY EXAM CHEST 2 VIEWS: CPT

## 2025-03-12 PROCEDURE — 36415 COLL VENOUS BLD VENIPUNCTURE: CPT | Performed by: STUDENT IN AN ORGANIZED HEALTH CARE EDUCATION/TRAINING PROGRAM

## 2025-03-12 PROCEDURE — G0378 HOSPITAL OBSERVATION PER HR: HCPCS

## 2025-03-12 PROCEDURE — 999N000128 HC STATISTIC PERIPHERAL IV START W/O US GUIDANCE

## 2025-03-12 PROCEDURE — 82435 ASSAY OF BLOOD CHLORIDE: CPT | Performed by: STUDENT IN AN ORGANIZED HEALTH CARE EDUCATION/TRAINING PROGRAM

## 2025-03-12 PROCEDURE — 93005 ELECTROCARDIOGRAM TRACING: CPT

## 2025-03-12 PROCEDURE — 99285 EMERGENCY DEPT VISIT HI MDM: CPT | Mod: 25

## 2025-03-12 PROCEDURE — 85025 COMPLETE CBC W/AUTO DIFF WBC: CPT | Performed by: STUDENT IN AN ORGANIZED HEALTH CARE EDUCATION/TRAINING PROGRAM

## 2025-03-12 PROCEDURE — 84484 ASSAY OF TROPONIN QUANT: CPT | Performed by: STUDENT IN AN ORGANIZED HEALTH CARE EDUCATION/TRAINING PROGRAM

## 2025-03-12 PROCEDURE — 83880 ASSAY OF NATRIURETIC PEPTIDE: CPT | Performed by: STUDENT IN AN ORGANIZED HEALTH CARE EDUCATION/TRAINING PROGRAM

## 2025-03-12 PROCEDURE — 80048 BASIC METABOLIC PNL TOTAL CA: CPT | Performed by: STUDENT IN AN ORGANIZED HEALTH CARE EDUCATION/TRAINING PROGRAM

## 2025-03-12 PROCEDURE — 96374 THER/PROPH/DIAG INJ IV PUSH: CPT

## 2025-03-12 PROCEDURE — 250N000013 HC RX MED GY IP 250 OP 250 PS 637: Performed by: STUDENT IN AN ORGANIZED HEALTH CARE EDUCATION/TRAINING PROGRAM

## 2025-03-12 PROCEDURE — 250N000011 HC RX IP 250 OP 636: Performed by: INTERNAL MEDICINE

## 2025-03-12 PROCEDURE — 250N000013 HC RX MED GY IP 250 OP 250 PS 637: Performed by: INTERNAL MEDICINE

## 2025-03-12 PROCEDURE — 99222 1ST HOSP IP/OBS MODERATE 55: CPT | Performed by: INTERNAL MEDICINE

## 2025-03-12 PROCEDURE — 87637 SARSCOV2&INF A&B&RSV AMP PRB: CPT | Performed by: STUDENT IN AN ORGANIZED HEALTH CARE EDUCATION/TRAINING PROGRAM

## 2025-03-12 PROCEDURE — 258N000003 HC RX IP 258 OP 636: Performed by: STUDENT IN AN ORGANIZED HEALTH CARE EDUCATION/TRAINING PROGRAM

## 2025-03-12 RX ORDER — NITROGLYCERIN 0.4 MG/1
0.4 TABLET SUBLINGUAL EVERY 5 MIN PRN
Status: DISCONTINUED | OUTPATIENT
Start: 2025-03-12 | End: 2025-03-12

## 2025-03-12 RX ORDER — MECLIZINE HCL 12.5 MG 12.5 MG/1
12.5 TABLET ORAL 3 TIMES DAILY PRN
Status: DISCONTINUED | OUTPATIENT
Start: 2025-03-12 | End: 2025-03-13 | Stop reason: HOSPADM

## 2025-03-12 RX ORDER — OMEPRAZOLE 20 MG/1
20 CAPSULE, DELAYED RELEASE ORAL
Status: DISCONTINUED | OUTPATIENT
Start: 2025-03-13 | End: 2025-03-12

## 2025-03-12 RX ORDER — FLUTICASONE PROPIONATE 50 MCG
1 SPRAY, SUSPENSION (ML) NASAL 2 TIMES DAILY
Status: DISCONTINUED | OUTPATIENT
Start: 2025-03-12 | End: 2025-03-13 | Stop reason: HOSPADM

## 2025-03-12 RX ORDER — AMOXICILLIN 250 MG
1 CAPSULE ORAL 2 TIMES DAILY PRN
Status: DISCONTINUED | OUTPATIENT
Start: 2025-03-12 | End: 2025-03-13 | Stop reason: HOSPADM

## 2025-03-12 RX ORDER — FUROSEMIDE 10 MG/ML
40 INJECTION INTRAMUSCULAR; INTRAVENOUS EVERY 12 HOURS
Status: COMPLETED | OUTPATIENT
Start: 2025-03-12 | End: 2025-03-13

## 2025-03-12 RX ORDER — ALBUTEROL SULFATE 90 UG/1
2 INHALANT RESPIRATORY (INHALATION) AT BEDTIME
Status: DISCONTINUED | OUTPATIENT
Start: 2025-03-12 | End: 2025-03-13 | Stop reason: HOSPADM

## 2025-03-12 RX ORDER — BUDESONIDE 0.5 MG/2ML
0.5 INHALANT ORAL DAILY PRN
Status: DISCONTINUED | OUTPATIENT
Start: 2025-03-12 | End: 2025-03-13 | Stop reason: HOSPADM

## 2025-03-12 RX ORDER — ACETAMINOPHEN 650 MG/1
650 SUPPOSITORY RECTAL EVERY 4 HOURS PRN
Status: DISCONTINUED | OUTPATIENT
Start: 2025-03-12 | End: 2025-03-13 | Stop reason: HOSPADM

## 2025-03-12 RX ORDER — PROCHLORPERAZINE MALEATE 5 MG/1
5 TABLET ORAL EVERY 6 HOURS PRN
Status: DISCONTINUED | OUTPATIENT
Start: 2025-03-12 | End: 2025-03-13 | Stop reason: HOSPADM

## 2025-03-12 RX ORDER — ACETAMINOPHEN 500 MG
1000 TABLET ORAL ONCE
Status: COMPLETED | OUTPATIENT
Start: 2025-03-12 | End: 2025-03-12

## 2025-03-12 RX ORDER — LIDOCAINE 40 MG/G
CREAM TOPICAL
Status: DISCONTINUED | OUTPATIENT
Start: 2025-03-12 | End: 2025-03-13 | Stop reason: HOSPADM

## 2025-03-12 RX ORDER — ATORVASTATIN CALCIUM 10 MG/1
10 TABLET, FILM COATED ORAL DAILY
Status: DISCONTINUED | OUTPATIENT
Start: 2025-03-13 | End: 2025-03-13 | Stop reason: HOSPADM

## 2025-03-12 RX ORDER — METOPROLOL SUCCINATE 25 MG/1
25 TABLET, EXTENDED RELEASE ORAL DAILY
Status: DISCONTINUED | OUTPATIENT
Start: 2025-03-13 | End: 2025-03-13 | Stop reason: HOSPADM

## 2025-03-12 RX ORDER — CETIRIZINE HYDROCHLORIDE 10 MG/1
10 TABLET ORAL EVERY EVENING
Status: DISCONTINUED | OUTPATIENT
Start: 2025-03-12 | End: 2025-03-13 | Stop reason: HOSPADM

## 2025-03-12 RX ORDER — AMOXICILLIN 250 MG
2 CAPSULE ORAL 2 TIMES DAILY PRN
Status: DISCONTINUED | OUTPATIENT
Start: 2025-03-12 | End: 2025-03-13 | Stop reason: HOSPADM

## 2025-03-12 RX ORDER — MECLIZINE HCL 12.5 MG 12.5 MG/1
12.5 TABLET ORAL 3 TIMES DAILY PRN
COMMUNITY

## 2025-03-12 RX ORDER — ACETAMINOPHEN 325 MG/1
650 TABLET ORAL EVERY 4 HOURS PRN
Status: DISCONTINUED | OUTPATIENT
Start: 2025-03-12 | End: 2025-03-13 | Stop reason: HOSPADM

## 2025-03-12 RX ORDER — ONDANSETRON 4 MG/1
4 TABLET, ORALLY DISINTEGRATING ORAL EVERY 6 HOURS PRN
Status: DISCONTINUED | OUTPATIENT
Start: 2025-03-12 | End: 2025-03-13 | Stop reason: HOSPADM

## 2025-03-12 RX ORDER — PANTOPRAZOLE SODIUM 40 MG/1
40 TABLET, DELAYED RELEASE ORAL
Status: DISCONTINUED | OUTPATIENT
Start: 2025-03-13 | End: 2025-03-13 | Stop reason: HOSPADM

## 2025-03-12 RX ORDER — FLUTICASONE FUROATE AND VILANTEROL 100; 25 UG/1; UG/1
1 POWDER RESPIRATORY (INHALATION) DAILY
Status: DISCONTINUED | OUTPATIENT
Start: 2025-03-13 | End: 2025-03-13 | Stop reason: HOSPADM

## 2025-03-12 RX ORDER — ONDANSETRON 2 MG/ML
4 INJECTION INTRAMUSCULAR; INTRAVENOUS EVERY 6 HOURS PRN
Status: DISCONTINUED | OUTPATIENT
Start: 2025-03-12 | End: 2025-03-13 | Stop reason: HOSPADM

## 2025-03-12 RX ADMIN — ALBUTEROL SULFATE 2 PUFF: 108 INHALANT RESPIRATORY (INHALATION) at 22:58

## 2025-03-12 RX ADMIN — FUROSEMIDE 40 MG: 10 INJECTION, SOLUTION INTRAVENOUS at 21:17

## 2025-03-12 RX ADMIN — SODIUM CHLORIDE 1000 ML: 0.9 INJECTION, SOLUTION INTRAVENOUS at 17:06

## 2025-03-12 RX ADMIN — ACETAMINOPHEN 1000 MG: 500 TABLET, FILM COATED ORAL at 16:21

## 2025-03-12 RX ADMIN — CETIRIZINE HYDROCHLORIDE 10 MG: 10 TABLET, FILM COATED ORAL at 21:17

## 2025-03-12 RX ADMIN — APIXABAN 2.5 MG: 2.5 TABLET, FILM COATED ORAL at 21:17

## 2025-03-12 RX ADMIN — LOSARTAN POTASSIUM 12.5 MG: 25 TABLET, FILM COATED ORAL at 23:00

## 2025-03-12 ASSESSMENT — ACTIVITIES OF DAILY LIVING (ADL)
ADLS_ACUITY_SCORE: 59

## 2025-03-12 NOTE — ED NOTES
Bed: ED28  Expected date:   Expected time:   Means of arrival:   Comments:  Tigist 2 79F chest pain/dizzy

## 2025-03-12 NOTE — ED NOTES
[facility]  ED Nurse Handoff Report    ED Chief complaint: Shortness of Breath and Dizziness      ED Diagnosis:   Final diagnoses:   Chest pain, unspecified type       Code Status: to be discussed    Allergies:   Allergies   Allergen Reactions    Sulfa Antibiotics Unknown     Long time ago, she cannot remember    Diazepam Other (See Comments)     Makes more anxious    Meperidine Nausea and Vomiting and Unknown    Morphine Nausea and Unknown    Penicillins Itching    Zafirlukast      She does not remember       Patient Story: Margareth Hogue is a 79 year old female anticoagulated on Eliquis with a history of paroxysmal atrial fibrillation , TIA asthma, gastroesophageal reflux disorder, renal cell carcinoma and hyperlipidemia who was brought in by EMS  for evaluation of chest pain, back pain, shortness of breath and dizziness which she experienced at around 1145 earlier today while she was having her lunch. She states she felt similar symptoms last week and she was asked to return to the ER if her symptoms return. She states she now feels fine and her symptoms are completely resolved. No new fall or injury. No new leg swelling.   Focused Assessment:  occasional chest pain    Treatments and/or interventions provided:   [facility]  ED Nurse Handoff Report    ED Chief complaint: Shortness of Breath and Dizziness      ED Diagnosis:   Final diagnoses:   Chest pain, unspecified type       Code Status: to be discussed    Allergies:   Allergies   Allergen Reactions    Sulfa Antibiotics Unknown     Long time ago, she cannot remember    Diazepam Other (See Comments)     Makes more anxious    Meperidine Nausea and Vomiting and Unknown    Morphine Nausea and Unknown    Penicillins Itching    Zafirlukast      She does not remember       Patient Story: Margareth Hogue is a 79 year old female anticoagulated on Eliquis with a history of paroxysmal atrial fibrillation , TIA asthma, gastroesophageal  reflux disorder, renal cell carcinoma and hyperlipidemia who was brought in by EMS  for evaluation of chest pain, back pain, shortness of breath and dizziness which she experienced at around 1145 earlier today while she was having her lunch. She states she felt similar symptoms last week and she was asked to return to the ER if her symptoms return. She states she now feels fine and her symptoms are completely resolved. No new fall or injury. No new leg swelling.   Focused Assessment:  occasional chest pain    Treatments and/or interventions provided:   Medications   nitroGLYcerin (NITROSTAT) sublingual tablet 0.4 mg (has no administration in time range)   acetaminophen (TYLENOL) tablet 1,000 mg (1,000 mg Oral $Given 3/12/25 162)   sodium chloride 0.9% BOLUS 1,000 mL (1,000 mLs Intravenous $New Bag 3/12/25 1709)      Abnormal Labs Resulted from Time of ED Arrival to Time of ED Departure   BASIC METABOLIC PANEL - Abnormal       Result Value    Sodium 130 (*)     Potassium 3.9      Chloride 99      Carbon Dioxide (CO2) 21 (*)     Anion Gap 10      Urea Nitrogen 13.3      Creatinine 0.65      GFR Estimate 89      Calcium 8.9      Glucose 137 (*)    TROPONIN T, HIGH SENSITIVITY - Abnormal    Troponin T, High Sensitivity 41 (*)    NT PROBNP INPATIENT - Abnormal    N terminal Pro BNP Inpatient 4,754 (*)    CBC WITH PLATELETS AND DIFFERENTIAL - Abnormal    WBC Count 6.0      RBC Count 3.89      Hemoglobin 11.1 (*)     Hematocrit 33.8 (*)     MCV 87      MCH 28.5      MCHC 32.8      RDW 13.7      Platelet Count 291      % Neutrophils 67      % Lymphocytes 23      % Monocytes 7      % Eosinophils 2      % Basophils 0      % Immature Granulocytes 0      NRBCs per 100 WBC 0      Absolute Neutrophils 4.1      Absolute Lymphocytes 1.4      Absolute Monocytes 0.4      Absolute Eosinophils 0.1      Absolute Basophils 0.0      Absolute Immature Granulocytes 0.0      Absolute NRBCs 0.0     TROPONIN T, HIGH SENSITIVITY - Abnormal     Troponin T, High Sensitivity 41 (*)       Chest XR,  PA & LAT   Final Result   IMPRESSION: Bilateral shoulder prostheses. Thoracolumbar scoliosis. Calcified mediastinal nodes.      New small bilateral pleural effusions. Peripheral pulmonary vessels appear mildly prominent therefore this most likely represents CHF.         Patient's response to treatments and/or interventions: good    To be done/followed up on inpatient unit:  na    Does this patient have any cognitive concerns?:  na    Activity level - Baseline/Home:  Independent  Activity Level - Current:   Stand with Assist    Patient's Preferred language: English   Needed?: No    Isolation: None  Infection: Not Applicable  Patient tested for COVID 19 prior to admission: YES  Bariatric?: No    Vital Signs:   Vitals:    03/12/25 1600 03/12/25 1626   BP:  115/78   Pulse: 74 77   Resp: (!) 48 12   SpO2: 95% 96%       Cardiac Rhythm:     Was the PSS-3 completed:   Yes  What interventions are required if any?               Family Comments: na  OBS brochure/video discussed/provided to patient/family: Yes              Name of person given brochure if not patient: na              Relationship to patient: na    For the majority of the shift this patient's behavior was Green.   Behavioral interventions performed were na.    ED NURSE PHONE NUMBER:

## 2025-03-12 NOTE — ED TRIAGE NOTES
Pt here by EMS, C/o SOB and dizziness, was here 1 week ago for 3 days due to a fall where she hit her head, incidentally was diagnosed with new heart failure at that time. 30 prior to calling EMS the SOB and dizziness started  lives at home. 324 of ASA given no nitroglycerine, Blood glucose was 183.

## 2025-03-12 NOTE — PHARMACY-ADMISSION MEDICATION HISTORY
Pharmacist Admission Medication History    Admission medication history is complete. The information provided in this note is only as accurate as the sources available at the time of the update.    Information Source(s): Patient and CareEverywhere/SureScripts via in-person    Pertinent Information: None    Changes made to PTA medication list:  Added: meclizine  Deleted: None  Changed: albuterol inhaler; prn to scheduled at bedtime and prn    Allergies reviewed with patient and updates made in EHR: no    Medication History Completed By: Keyur Sanchez RPH 3/12/2025 5:10 PM    PTA Med List   Medication Sig Last Dose/Taking    albuterol (PROAIR HFA/PROVENTIL HFA/VENTOLIN HFA) 108 (90 Base) MCG/ACT inhaler Inhale 2 puffs into the lungs at bedtime. And Q6H as needed Taking    apixaban ANTICOAGULANT (ELIQUIS) 2.5 MG tablet Take 2.5 mg by mouth 2 times daily. 3/12/2025 Morning    atorvastatin (LIPITOR) 10 MG tablet Take 1 tablet (10 mg) by mouth daily. 3/12/2025 Morning    budesonide (PULMICORT) 0.5 MG/2ML neb solution Take 0.5 mg by nebulization daily as needed (short of breath, wheezing). Taking As Needed    calcium carbonate (OS-SHINE) 1500 (600 Ca) MG tablet Take 600 mg by mouth daily. 3/12/2025 Morning    cetirizine (ZYRTEC) 10 MG tablet Take 10 mg by mouth every evening. 3/11/2025 Evening    conjugated estrogens (PREMARIN) 0.625 MG/GM vaginal cream Place vaginally daily as needed. Taking As Needed    docusate sodium (DSS) 100 MG capsule Take 1 capsule by mouth every evening. 3/11/2025    fish oil-omega-3 fatty acids 1000 MG capsule Take 1 g by mouth 2 times daily. 3/12/2025 Morning    fluticasone (FLONASE) 50 MCG/ACT nasal spray Spray 1 spray into both nostrils 2 times daily. 3/12/2025 Morning    ipratropium (ATROVENT) 0.02 % neb solution Inhale 0.5 mg into the lungs every 6 hours as needed for wheezing. Taking As Needed    ketoconazole (NIZORAL) 2 % external cream Apply topically daily as needed for itching or  irritation. To feet Taking As Needed    losartan (COZAAR) 25 MG tablet Take 0.5 tablets (12.5 mg) by mouth every evening. 3/11/2025 Evening    meclizine (ANTIVERT) 12.5 MG tablet Take 12.5 mg by mouth 3 times daily as needed for dizziness. Taking As Needed    metoprolol succinate ER (TOPROL XL) 25 MG 24 hr tablet Take 1 tablet (25 mg) by mouth daily. 3/12/2025 Morning    omeprazole (PRILOSEC) 20 MG DR capsule Take 20 mg by mouth daily as needed. Taking As Needed    polyethylene glycol (MIRALAX) 17 g packet Take 1 packet by mouth daily as needed for constipation. Taking As Needed    polyethylene glycol-propylene glycol (SYSTANE ULTRA) 0.4-0.3 % SOLN ophthalmic solution Place 1-2 drops into both eyes 4 times daily as needed for dry eyes. Taking As Needed    sodium chloride (OCEAN) 0.65 % nasal spray Spray 1 spray into both nostrils 2 times daily. 3/12/2025 Morning    TRELEGY ELLIPTA 100-62.5-25 MCG/ACT oral inhaler Inhale 2 puffs into the lungs daily 3/12/2025 Morning

## 2025-03-12 NOTE — ED PROVIDER NOTES
"  Emergency Department Note      History of Present Illness     Chief Complaint   Shortness of Breath and Dizziness      HPI   Margareth Hogue is a 79 year old female anticoagulated on Eliquis with a history of paroxysmal atrial fibrillation , TIA asthma, gastroesophageal reflux disorder, renal cell carcinoma and hyperlipidemia who was brought in by EMS  for evaluation of chest pain, back pain, shortness of breath and dizziness which she experienced at around 1145 earlier today while she was having her lunch. She states she felt similar symptoms last week and she was asked to return to the ER if her symptoms return. She states she now feels fine and her symptoms are completely resolved. No new fall or injury. No new leg swelling.     Independent Historian   EMS as detailed above.    Review of External Notes   None    Past Medical History     Medical History and Problem List   Osteoarthritis  Hyponatremia  Allergic conjunctivitis of both eyes  Bilateral sensorineural hearing loss  Chronic insomnia  GERD  Adenomatous polyp of colon  Renal cell carcinoma  Hyperlipidemia  IBS  Asthma  Paroxysmal atrial fibrillation  Sarcoidosis, lung  Seasonal allergies  TIA     Medications   Albuterol inhaler  Eliquis  Lipitor  Zyrtec  Tiazac  Prilosec     Surgical History   Nephrectomy, unilateral  Physical Exam     Patient Vitals for the past 24 hrs:   BP Temp Temp src Pulse Resp SpO2 Height Weight   03/12/25 1931 131/74 97.4  F (36.3  C) Oral 79 21 95 % 1.6 m (5' 3\") 61.6 kg (135 lb 12.9 oz)   03/12/25 1900 132/83 97.9  F (36.6  C) Oral 84 21 96 % -- --   03/12/25 1626 115/78 -- -- 77 12 96 % -- --   03/12/25 1600 -- -- -- 74 (!) 48 95 % -- --     Physical Exam  General: Awake, alert, in no acute distress   HEENT: Atraumatic   EOM normal   External ears normal   Trachea midline  Neck: Supple, normal ROM  CV: Regular rate, regular rhythm   Mild extremity edema  2+ radial and DP pulses  PULM: Breath sounds normal bilaterally  No " wheezes or rales  ABD: Soft, non-tender, non-distended  Normal bowel sounds   No rebound or guarding   MSK: No gross deformities  NEURO: Alert, no focal deficits  Skin: Warm, dry and intact     Diagnostics     Lab Results   Labs Ordered and Resulted from Time of ED Arrival to Time of ED Departure   BASIC METABOLIC PANEL - Abnormal       Result Value    Sodium 130 (*)     Potassium 3.9      Chloride 99      Carbon Dioxide (CO2) 21 (*)     Anion Gap 10      Urea Nitrogen 13.3      Creatinine 0.65      GFR Estimate 89      Calcium 8.9      Glucose 137 (*)    TROPONIN T, HIGH SENSITIVITY - Abnormal    Troponin T, High Sensitivity 41 (*)    NT PROBNP INPATIENT - Abnormal    N terminal Pro BNP Inpatient 4,754 (*)    CBC WITH PLATELETS AND DIFFERENTIAL - Abnormal    WBC Count 6.0      RBC Count 3.89      Hemoglobin 11.1 (*)     Hematocrit 33.8 (*)     MCV 87      MCH 28.5      MCHC 32.8      RDW 13.7      Platelet Count 291      % Neutrophils 67      % Lymphocytes 23      % Monocytes 7      % Eosinophils 2      % Basophils 0      % Immature Granulocytes 0      NRBCs per 100 WBC 0      Absolute Neutrophils 4.1      Absolute Lymphocytes 1.4      Absolute Monocytes 0.4      Absolute Eosinophils 0.1      Absolute Basophils 0.0      Absolute Immature Granulocytes 0.0      Absolute NRBCs 0.0     TROPONIN T, HIGH SENSITIVITY - Abnormal    Troponin T, High Sensitivity 41 (*)    INFLUENZA A/B, RSV AND SARS-COV2 PCR - Normal    Influenza A PCR Negative      Influenza B PCR Negative      RSV PCR Negative      SARS CoV2 PCR Negative         Imaging   Chest XR,  PA & LAT   Final Result   IMPRESSION: Bilateral shoulder prostheses. Thoracolumbar scoliosis. Calcified mediastinal nodes.      New small bilateral pleural effusions. Peripheral pulmonary vessels appear mildly prominent therefore this most likely represents CHF.          EKG   ECG taken at 1317, ECG read at 1322  Normal sinus rhythm  Rightward axis  Can not rule out Anterior  infarct, age undetermined  St &T wave abnormality, consider inferolateral ischemia   New T wave inversions inferolaterally as compared to prior, dated 03/06/2024.  Rate 87 bpm. DE interval 172 ms. QRS duration 102 ms. QT/QTc 382/459 ms. P-R-T axes 57 106 -24.      Independent Interpretation   None    ED Course      Medications Administered   Medications   albuterol (PROVENTIL HFA/VENTOLIN HFA) inhaler (has no administration in time range)   apixaban ANTICOAGULANT (ELIQUIS) tablet 2.5 mg (has no administration in time range)   atorvastatin (LIPITOR) tablet 10 mg (has no administration in time range)   budesonide (PULMICORT) neb solution 0.5 mg (has no administration in time range)   cetirizine (zyrTEC) tablet 10 mg (has no administration in time range)   fluticasone (FLONASE) 50 MCG/ACT spray 1 spray (has no administration in time range)   ipratropium (ATROVENT) 0.02 % neb solution 0.5 mg (has no administration in time range)   losartan (COZAAR) half-tab 12.5 mg (has no administration in time range)   meclizine (ANTIVERT) tablet 12.5 mg (has no administration in time range)   metoprolol succinate ER (TOPROL XL) 24 hr tablet 25 mg (has no administration in time range)   omeprazole (PriLOSEC) CR capsule 20 mg (has no administration in time range)   polyethylene glycol-propylene glycol (SYSTANE ULTRA) ophthalmic solution 1-2 drop (has no administration in time range)   fluticasone-vilanterol (BREO ELLIPTA) 100-25 MCG/ACT inhaler 1 puff (has no administration in time range)     And   umeclidinium (INCRUSE ELLIPTA) 62.5 MCG/ACT inhaler 1 puff (has no administration in time range)   senna-docusate (SENOKOT-S/PERICOLACE) 8.6-50 MG per tablet 1 tablet (has no administration in time range)     Or   senna-docusate (SENOKOT-S/PERICOLACE) 8.6-50 MG per tablet 2 tablet (has no administration in time range)   ondansetron (ZOFRAN ODT) ODT tab 4 mg (has no administration in time range)     Or   ondansetron (ZOFRAN) injection 4 mg  (has no administration in time range)   prochlorperazine (COMPAZINE) injection 5 mg (has no administration in time range)     Or   prochlorperazine (COMPAZINE) tablet 5 mg (has no administration in time range)   lidocaine 1 % 0.1-1 mL (has no administration in time range)   lidocaine (LMX4) cream (has no administration in time range)   sodium chloride (PF) 0.9% PF flush 3 mL (has no administration in time range)   sodium chloride (PF) 0.9% PF flush 3 mL (has no administration in time range)   furosemide (LASIX) injection 40 mg (has no administration in time range)   Patient is already receiving anticoagulation with heparin, enoxaparin (LOVENOX), warfarin (COUMADIN)  or other anticoagulant medication (has no administration in time range)   acetaminophen (TYLENOL) tablet 650 mg (has no administration in time range)     Or   acetaminophen (TYLENOL) Suppository 650 mg (has no administration in time range)   acetaminophen (TYLENOL) tablet 1,000 mg (1,000 mg Oral $Given 3/12/25 1621)   sodium chloride 0.9% BOLUS 1,000 mL (1,000 mLs Intravenous $New Bag 3/12/25 1706)       Procedures   Procedures     Discussion of Management   Cardiology, Dr. Majano    ED Course   ED Course as of 03/1945   Wed Mar 12, 2025   1337 I obtained the history and examined the patient as above.    1636 Recheck. Pain 3/10.    1701 Spoke with cardiology Dr. Majano    1737 Spoke to Dr. Howard who kindly accepts observation        Additional Documentation  None    Medical Decision Making / Diagnosis     CMS Diagnoses: None    MIPS       None    MDM   Margareth Hogue is a 79 year old female here with chest pain in the setting of being recently diagnosed with Takotsubo cardiomyopathy.  It seems as well as diagnosis was almost incidental.  She had a slip and fall on the ice, had a markedly elevated troponin, normal cardiac cath within the last 1 week.     Her description of symptoms is concerning for cardiac related chest pain particularly with T  wave inversions in the inferior leads though troponin x 2 is flat.  Received aspirin 325 prior to arrival.  Pain is completely resolved by Tylenol alone.  Low suspicion for asthma exacerbation as I hear no wheezing.  There is no evidence of pneumonia on x-ray.  There does appear to be some fluid overload on x-ray, only scant lower extremity edema clinically.  I did a bedside echo which does not show obvious thrombus nor pericardial effusion.  Discussed with patient reassuring exam findings though given her recent diagnosis of Takotsubo cardiomyopathy with reduced ejection fraction and ongoing chest pain I am recommending observation.  I spoke with the above cardiologist regarding case.  Would be reasonable to try nitroglycerin in case her chest pain is vasospastic in nature though by the time we plan to give this her pain had already resolved.  Spoke with the above hospitalist who currently accepts.  Patient understands plan for admission.  All questions answered.        Disposition   The patient was admitted to the hospital.     Diagnosis     ICD-10-CM    1. Chest pain, unspecified type  R07.9          -        Scribe Disclosure:  I, Liz Donis, am serving as a scribe at 1:36 PM on 3/12/2025 to document services personally performed by Rhonda Edwards DO based on my observations and the provider's statements to me.        Rhonda Edwards DO  03/1945

## 2025-03-12 NOTE — H&P
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 Found to have NSTEMI. Underwent cardiac cath (3/7)which showed normal coronaries, LV sever apical ballooning with sparing of base LVEF 35-40%, typical for Takotsubo.  TTE showed LVEF 35-40% moderate to severe anterior, septal, and apical wall hypokinesis. Discharged on Metoprolol XL and Losartan. Prior to admission Diltiazem discontinued.   *patient now presenting with c/o chest tightness/sob/dizziness while eating lunch. Symptoms have improved upon arrival.  *labs notable for Na 130, BNP 4754 (511 6days ago), Troponin flat 41-41, (648-->400s on 3/6).  Initial EKG showed SR with TWI in the inferolateral leads, repeat EKG showed SR and no longer T wave inversion. She has received nitroglycerin in the ED for reported chest pain and also 1L IVF.   *CXR shows w small bilateral pleural effusions. Peripheral pulmonary vessels appear mildly prominent therefore this most likely represents CHF.   *given elevated proBNP and chest xrray finding suspect some component of HF contributing. Trop is flat.     - Lasix 40mg IV BID x 2 doses ordered  - given recent hospital admission, new HF diagnosis, will consult cardiology  - defer to cardiology if need to repeat echo  - resume PTA Metoprolol XL and Losartan  - monitor strict intake and output, daily weight  - telemetry monitoring      Hypertension  - meds as above  Losartan/BB     History of paroxysmal A-fib  -PTA on BB above and Eliquis  - resume PTA BB and eliquis     History of dyslipidemia  -On atorvastatin 10 mg daily     h/o CVA/TIA  -atorvastatin 10 mg daily and apixaban 2.5 mg twice daily     History of asthma  No acute issues at this time. no wheezing  -On Breo and Incruse inhaler     GERD  -Resume PTA PPI     History of sarcoidosis, lung  - noted        Diet: Regular Diet Adult    DVT Prophylaxis: DOAC  Curry Catheter: Not present  Lines: None     Cardiac Monitoring: None  Code Status:  full code, discussed with patient    Clinically Significant Risk Factors Present on Admission         # Hyponatremia: Lowest Na = 130 mmol/L in last 2 days, will monitor as appropriate        # Drug Induced Coagulation Defect: home medication list includes an anticoagulant medication    # Hypertension: Home medication list includes antihypertensive(s)  # Chronic heart failure with reduced ejection fraction: last echo with EF <40%              # Financial/Environmental Concerns:    # Asthma: noted on problem list        Disposition Plan     Medically Ready for Discharge: Anticipated in 2-4 Days           Angel Howard MD  Hospitalist Service  [facility]  Securely message with [employer] (more info)  Text page via [employer] Paging/Directory     ______________________________________________________________________    Chief Complaint   Shortness of breath, chest tightness    History is obtained from the patient, chart review    History of Present Illness   Margareth Hogue is a 79 year old female with a hx of parx a.fib on Elquis, asthma, GERD, recent hospital admission 3/5-3/8 after fall found to have NSTEMI, possible Takotsubo CM who presents for evaluation of chest tightness and shortness of breath. She reports she was eating lunch when she developed substernal chest tightness, shortness of breath and felt dizziness. She was given nitroglycerin in the ED which  somewhat helped the chest tightness, but gave her headache. Shortness of breath and dizziness have resolved prior to to nitroglycerin. She does report having GERD, but states this is not typical symptom for her.   Denies increasing LE edema. She reports orthopnea, typically uses 1 pillow to sleep, but last night had to use 2 pillows. Denies paroxsymal nocturnal dyspnea. Denies fever, abdominal pain, nausea or vomiting.     Labs in the ED notable for Na 130, proBNP 4754, trop 41-->41.   CXR shows small bilateral pleural effusions. Peripheral pulmonary vessels appear mildly prominent therefore this most likely represents CHF.   EKG as above.  Patient admitted under observation for further care        Past Medical History    No past medical history on file.    Past Surgical History   Past Surgical History:   Procedure Laterality Date    CV CORONARY ANGIOGRAM N/A 3/7/2025    Procedure: Coronary Angiogram;  Surgeon: Darryl Chris MD;  Location:  HEART CARDIAC CATH LAB    CV LEFT HEART CATH N/A 3/7/2025    Procedure: Left Heart Catheterization;  Surgeon: Darryl Chris MD;  Location:  HEART CARDIAC CATH LAB    CV LEFT VENTRICULOGRAM N/A 3/7/2025    Procedure: Left Ventriculogram;  Surgeon: Darryl Chris MD;  Location:  HEART CARDIAC CATH LAB       Prior to Admission Medications   Prior to Admission Medications   Prescriptions Last Dose Informant Patient Reported? Taking?   TRELEGY ELLIPTA 100-62.5-25 MCG/ACT oral inhaler 3/12/2025 Morning  Yes Yes   Sig: Inhale 2 puffs into the lungs daily   albuterol (PROAIR HFA/PROVENTIL HFA/VENTOLIN HFA) 108 (90 Base) MCG/ACT inhaler   Yes Yes   Sig: Inhale 2 puffs into the lungs at bedtime. And Q6H as needed   apixaban ANTICOAGULANT (ELIQUIS) 2.5 MG tablet 3/12/2025 Morning  Yes Yes   Sig: Take 2.5 mg by mouth 2 times daily.   atorvastatin (LIPITOR) 10 MG tablet 3/12/2025 Morning  No Yes   Sig: Take 1 tablet (10 mg) by mouth daily.   budesonide (PULMICORT)  0.5 MG/2ML neb solution   Yes Yes   Sig: Take 0.5 mg by nebulization daily as needed (short of breath, wheezing).   calcium carbonate (OS-SHINE) 1500 (600 Ca) MG tablet 3/12/2025 Morning  Yes Yes   Sig: Take 600 mg by mouth daily.   cetirizine (ZYRTEC) 10 MG tablet 3/11/2025 Evening  Yes Yes   Sig: Take 10 mg by mouth every evening.   conjugated estrogens (PREMARIN) 0.625 MG/GM vaginal cream   Yes Yes   Sig: Place vaginally daily as needed.   docusate sodium (DSS) 100 MG capsule 3/11/2025  Yes Yes   Sig: Take 1 capsule by mouth every evening.   fish oil-omega-3 fatty acids 1000 MG capsule 3/12/2025 Morning  Yes Yes   Sig: Take 1 g by mouth 2 times daily.   fluticasone (FLONASE) 50 MCG/ACT nasal spray 3/12/2025 Morning  Yes Yes   Sig: Spray 1 spray into both nostrils 2 times daily.   ipratropium (ATROVENT) 0.02 % neb solution   Yes Yes   Sig: Inhale 0.5 mg into the lungs every 6 hours as needed for wheezing.   ketoconazole (NIZORAL) 2 % external cream   Yes Yes   Sig: Apply topically daily as needed for itching or irritation. To feet   losartan (COZAAR) 25 MG tablet 3/11/2025 Evening  No Yes   Sig: Take 0.5 tablets (12.5 mg) by mouth every evening.   meclizine (ANTIVERT) 12.5 MG tablet   Yes Yes   Sig: Take 12.5 mg by mouth 3 times daily as needed for dizziness.   metoprolol succinate ER (TOPROL XL) 25 MG 24 hr tablet 3/12/2025 Morning  No Yes   Sig: Take 1 tablet (25 mg) by mouth daily.   omeprazole (PRILOSEC) 20 MG DR capsule   Yes Yes   Sig: Take 20 mg by mouth daily as needed.   polyethylene glycol (MIRALAX) 17 g packet   Yes Yes   Sig: Take 1 packet by mouth daily as needed for constipation.   polyethylene glycol-propylene glycol (SYSTANE ULTRA) 0.4-0.3 % SOLN ophthalmic solution   Yes Yes   Sig: Place 1-2 drops into both eyes 4 times daily as needed for dry eyes.   sodium chloride (OCEAN) 0.65 % nasal spray 3/12/2025 Morning  Yes Yes   Sig: Spray 1 spray into both nostrils 2 times daily.       Facility-Administered Medications: None        Review of Systems    The 10 point Review of Systems is negative other than noted in the HPI      Physical Exam   Vital Signs:     BP: 115/78 Pulse: 77   Resp: 12 SpO2: 96 %      Weight: 0 lbs 0 oz    General Appearance: alert, awake and no apparent distress  HEENT: NCAT, oral mucosa is moist, no pharyngeal erythema or exudate  Respiratory: decreased BS at the bases, upper lungs clear to auscultation bilaterally  Cardiovascular: regular rate and rhythm, trace LE edema  GI: soft and non-tender  Skin: warm and dry  Neurologic: alert, awake and oriented  Psychiatric: mood and affect are normal    Medical Decision Making       MANAGEMENT DISCUSSED with the following over the past 24 hours: patient   NOTE(S)/MEDICAL RECORDS REVIEWED over the past 24 hours: hospital discharge summary from 3/8, ED note  Tests ORDERED & REVIEWED in the past 24 hours:  - BMP  - CBC  - BNP  - Troponin  - Influenza A/B, covid 19 PCR  Tests personally interpreted in the past 24 hours:        CXR report reviewed, EKG reviewed  Data     I have personally reviewed the following data over the past 24 hrs:    6.0  \   11.1 (L)   / 291     130 (L) 99 13.3 /  137 (H)   3.9 21 (L) 0.65 \     Trop: 41 (H) BNP: 4,754 (H)       Imaging results reviewed over the past 24 hrs:   Recent Results (from the past 24 hours)   Chest XR,  PA & LAT    Narrative    EXAM: XR CHEST 2 VIEWS  LOCATION: [facility]  DATE: 3/12/2025    INDICATION: CP  COMPARISON: 3/5/2025      Impression    IMPRESSION: Bilateral shoulder prostheses. Thoracolumbar scoliosis. Calcified mediastinal nodes.    New small bilateral pleural effusions. Peripheral pulmonary vessels appear mildly prominent therefore this most likely represents CHF.

## 2025-03-13 VITALS
TEMPERATURE: 98.2 F | OXYGEN SATURATION: 93 % | DIASTOLIC BLOOD PRESSURE: 68 MMHG | HEIGHT: 63 IN | BODY MASS INDEX: 22.95 KG/M2 | HEART RATE: 72 BPM | SYSTOLIC BLOOD PRESSURE: 117 MMHG | WEIGHT: 129.5 LBS | RESPIRATION RATE: 16 BRPM

## 2025-03-13 LAB
ANION GAP SERPL CALCULATED.3IONS-SCNC: 8 MMOL/L (ref 7–15)
BUN SERPL-MCNC: 20.1 MG/DL (ref 8–23)
CALCIUM SERPL-MCNC: 8.9 MG/DL (ref 8.8–10.4)
CHLORIDE SERPL-SCNC: 100 MMOL/L (ref 98–107)
CREAT SERPL-MCNC: 0.69 MG/DL (ref 0.51–0.95)
EGFRCR SERPLBLD CKD-EPI 2021: 87 ML/MIN/1.73M2
ERYTHROCYTE [DISTWIDTH] IN BLOOD BY AUTOMATED COUNT: 13.7 % (ref 10–15)
GLUCOSE SERPL-MCNC: 70 MG/DL (ref 70–99)
HCO3 SERPL-SCNC: 24 MMOL/L (ref 22–29)
HCT VFR BLD AUTO: 33.8 % (ref 35–47)
HGB BLD-MCNC: 11 G/DL (ref 11.7–15.7)
MCH RBC QN AUTO: 28.1 PG (ref 26.5–33)
MCHC RBC AUTO-ENTMCNC: 32.5 G/DL (ref 31.5–36.5)
MCV RBC AUTO: 86 FL (ref 78–100)
PLATELET # BLD AUTO: 358 10E3/UL (ref 150–450)
POTASSIUM SERPL-SCNC: 4.1 MMOL/L (ref 3.4–5.3)
RBC # BLD AUTO: 3.91 10E6/UL (ref 3.8–5.2)
SODIUM SERPL-SCNC: 132 MMOL/L (ref 135–145)
WBC # BLD AUTO: 6 10E3/UL (ref 4–11)

## 2025-03-13 PROCEDURE — G0378 HOSPITAL OBSERVATION PER HR: HCPCS

## 2025-03-13 PROCEDURE — 82565 ASSAY OF CREATININE: CPT | Performed by: INTERNAL MEDICINE

## 2025-03-13 PROCEDURE — 99417 PROLNG OP E/M EACH 15 MIN: CPT | Performed by: INTERNAL MEDICINE

## 2025-03-13 PROCEDURE — 84132 ASSAY OF SERUM POTASSIUM: CPT | Performed by: INTERNAL MEDICINE

## 2025-03-13 PROCEDURE — 85018 HEMOGLOBIN: CPT | Performed by: INTERNAL MEDICINE

## 2025-03-13 PROCEDURE — 99239 HOSP IP/OBS DSCHRG MGMT >30: CPT

## 2025-03-13 PROCEDURE — 250N000013 HC RX MED GY IP 250 OP 250 PS 637: Performed by: INTERNAL MEDICINE

## 2025-03-13 PROCEDURE — 85041 AUTOMATED RBC COUNT: CPT | Performed by: INTERNAL MEDICINE

## 2025-03-13 PROCEDURE — 99215 OFFICE O/P EST HI 40 MIN: CPT | Performed by: INTERNAL MEDICINE

## 2025-03-13 PROCEDURE — 36415 COLL VENOUS BLD VENIPUNCTURE: CPT | Performed by: INTERNAL MEDICINE

## 2025-03-13 PROCEDURE — 80048 BASIC METABOLIC PNL TOTAL CA: CPT | Performed by: INTERNAL MEDICINE

## 2025-03-13 RX ORDER — FUROSEMIDE 20 MG/1
20 TABLET ORAL EVERY MORNING
Qty: 30 TABLET | Refills: 0 | Status: SHIPPED | OUTPATIENT
Start: 2025-03-14 | End: 2025-04-13

## 2025-03-13 RX ORDER — VALSARTAN 40 MG/1
40 TABLET ORAL EVERY EVENING
Status: DISCONTINUED | OUTPATIENT
Start: 2025-03-13 | End: 2025-03-13 | Stop reason: HOSPADM

## 2025-03-13 RX ORDER — VALSARTAN 40 MG/1
40 TABLET ORAL EVERY EVENING
Qty: 30 TABLET | Refills: 0 | Status: SHIPPED | OUTPATIENT
Start: 2025-03-13 | End: 2025-04-12

## 2025-03-13 RX ORDER — FUROSEMIDE 20 MG/1
20 TABLET ORAL EVERY MORNING
Status: DISCONTINUED | OUTPATIENT
Start: 2025-03-14 | End: 2025-03-13 | Stop reason: HOSPADM

## 2025-03-13 RX ADMIN — APIXABAN 2.5 MG: 2.5 TABLET, FILM COATED ORAL at 07:58

## 2025-03-13 RX ADMIN — METOPROLOL SUCCINATE 25 MG: 25 TABLET, EXTENDED RELEASE ORAL at 07:58

## 2025-03-13 RX ADMIN — ACETAMINOPHEN 650 MG: 325 TABLET, FILM COATED ORAL at 01:52

## 2025-03-13 RX ADMIN — PANTOPRAZOLE SODIUM 40 MG: 40 TABLET, DELAYED RELEASE ORAL at 07:07

## 2025-03-13 RX ADMIN — FLUTICASONE FUROATE AND VILANTEROL TRIFENATATE 1 PUFF: 100; 25 POWDER RESPIRATORY (INHALATION) at 08:00

## 2025-03-13 RX ADMIN — ACETAMINOPHEN 650 MG: 325 TABLET, FILM COATED ORAL at 09:20

## 2025-03-13 RX ADMIN — ATORVASTATIN CALCIUM 10 MG: 10 TABLET, FILM COATED ORAL at 07:58

## 2025-03-13 RX ADMIN — UMECLIDINIUM 1 PUFF: 62.5 AEROSOL, POWDER ORAL at 08:00

## 2025-03-13 ASSESSMENT — ACTIVITIES OF DAILY LIVING (ADL)
ADLS_ACUITY_SCORE: 59
ADLS_ACUITY_SCORE: 53
ADLS_ACUITY_SCORE: 59
ADLS_ACUITY_SCORE: 53
ADLS_ACUITY_SCORE: 59
ADLS_ACUITY_SCORE: 53

## 2025-03-13 NOTE — CONSULTS
No further care management intervention anticipated at this time.  Please re-consult if further needs arise.  Care management signing off.

## 2025-03-13 NOTE — PLAN OF CARE
Goal Outcome Evaluation:      Plan of Care Reviewed With: patient    Overall Patient Progress: no changeOverall Patient Progress: no change  PRIMARY Concern: chest pain, SOB. CHF exacerbation  SAFETY RISK Concerns (fall risk, behaviors, etc.):      Aggression Tool Color: green  Isolation/Type: na  Tests/Procedures for NEXT shift: IV lasix   Consults? (Pending/following, signed-off?) cards  Where is patient from? (Home, TCU, etc.): home, apt w spouse  Other Important info for NEXT shift: taking IV lasix, has questions for MD prior to taking any further doses  Anticipated DC date & active delays: TBD  _____________________________________________________________________________  SUMMARY NOTE:   Orientation/Cognitive: A&Ox4  Observation Goals (Met/ Not Met): not met  Mobility Level/Assist Equipment: IND w walker  Antibiotics & Plan (IV/po, length of tx left): na  Pain Management: tylenol for pain  Complete Pain Reassessment: Y/N Y Due next: nest shift  Tele/VS/O2: VSS on RA  ABNL Lab/BG: Na+ 130, BNP 4754, cxray shows small bilat pleural effusions consistent with CHF exacerbation   Diet: cardiac  Bowel/Bladder: Cont of B&B  Skin Concerns:   Drains/Devices: PIV  Patient Stated Goal for Today: go home

## 2025-03-13 NOTE — CONSULTS
Inpatient Cardiology Consultation.   [facility]  Date of Admission: 3/12/2025  Date of Consult: March 13, 2025    PRIMARY CARDIOLOGY TEAM:  Dr. Payam Munroe.      DIAGNOSES/ASSESSMENT:    Acute heart failure with moderately reduced ejection fraction secondary to recently diagnosed stress-induced cardiomyopathy, LVEF 35-40% with typical wall motion abnormalities.  Was not on home diuretic which likely precipitated the episode.  Significantly improved with IV furosemide diuresis.  Angiographically normal coronary arteries, 1025.  Paroxysmal Atrial Fibrillation.  On apixaban and metoprolol XL.    PLAN:    Recommend transitioning her to stable dose of oral furosemide 20 mg daily.  Please switch her from losartan 12.5 mg daily to valsartan 40 mg daily (in the evening) which will allow seamless transition to [facility] as an outpatient.  Continue established metoprolol XL 25 mg morning and apixaban.  Okay to start scheduled cardiac rehab exercise program tomorrow.  Outpatient follow-up with ANTONY.  Cardiology will sign off.  Thank you for consulting us.      Mary Rich MD, MD FAC  Cardiology    Total time today 80 minutes.      CODE STATUS:  Full Code      REASON FOR CONSULT:  Acute heart failure.    HISTORY OF PRESENT ILLNESS:  Margareth Hogue is a 79-year-old female presents with chest discomfort, shortness of breath, and dizziness. She has a history of paroxysmal atrial fibrillation and recent stress-induced cardiomyopathy in the context of mechanical fall. Patient was discharged 1 week ago with a diagnosis of stress-induced cardiomyopathy, LVEF 35-40%, and normal coronary arteries on angiogram.    Yesterday, she experienced a 1-hour episode of vague chest discomfort, shortness of breath, and dizziness while at rest. In the ER, her vitals were: /78, O2 sat 95%, pulse 74 bpm. EKG showed sinus rhythm with unchanged ST changes (poor R wave progression in V1-V3). High-sensitivity  Troponin T was minimally elevated with a non-myocardial injury pattern in 40s. NT-proBNP: 4700. Normal renal panel. Hb 11.0.    She has diuresed well with IV furosemide and is feeling much improved.  Current medications include metoprolol XL 25 mg daily, losartan 12.5 mg daily, and apixaban.     She lives independently with her . She is scheduled to start a cardiac rehab exercise program tomorrow.      Vital Signs:    - BP: 115/78 mmHg, O2 Sat: 95%, HR: 74 bpm    Physical Exam:    - General: Alert, excellent historian. Seated in bed.    - Respiratory: Few rales at lung bases.    - CV: Regular heart sounds. No murmur. No lower extremity edema.    REVIEW OF SYSTEMS:  A comprehensive 10 point review of systems was completed and the pertinent positives are documented in history of present illness.    FAMILY HISTORY:  Family History   Problem Relation Age of Onset    Heart Defect Mother     Myocardial Infarction Father        MEDICATIONS:  Prior to Admission Medications   Prescriptions Last Dose Informant Patient Reported? Taking?   TRELEGY ELLIPTA 100-62.5-25 MCG/ACT oral inhaler 3/12/2025 Morning  Yes Yes   Sig: Inhale 2 puffs into the lungs daily   albuterol (PROAIR HFA/PROVENTIL HFA/VENTOLIN HFA) 108 (90 Base) MCG/ACT inhaler   Yes Yes   Sig: Inhale 2 puffs into the lungs at bedtime. And Q6H as needed   apixaban ANTICOAGULANT (ELIQUIS) 2.5 MG tablet 3/12/2025 Morning  Yes Yes   Sig: Take 2.5 mg by mouth 2 times daily.   atorvastatin (LIPITOR) 10 MG tablet 3/12/2025 Morning  No Yes   Sig: Take 1 tablet (10 mg) by mouth daily.   budesonide (PULMICORT) 0.5 MG/2ML neb solution   Yes Yes   Sig: Take 0.5 mg by nebulization daily as needed (short of breath, wheezing).   calcium carbonate (OS-SHINE) 1500 (600 Ca) MG tablet 3/12/2025 Morning  Yes Yes   Sig: Take 600 mg by mouth daily.   cetirizine (ZYRTEC) 10 MG tablet 3/11/2025 Evening  Yes Yes   Sig: Take 10 mg by mouth every evening.   conjugated estrogens (PREMARIN)  0.625 MG/GM vaginal cream   Yes Yes   Sig: Place vaginally daily as needed.   docusate sodium (DSS) 100 MG capsule 3/11/2025  Yes Yes   Sig: Take 1 capsule by mouth every evening.   fish oil-omega-3 fatty acids 1000 MG capsule 3/12/2025 Morning  Yes Yes   Sig: Take 1 g by mouth 2 times daily.   fluticasone (FLONASE) 50 MCG/ACT nasal spray 3/12/2025 Morning  Yes Yes   Sig: Spray 1 spray into both nostrils 2 times daily.   ipratropium (ATROVENT) 0.02 % neb solution   Yes Yes   Sig: Inhale 0.5 mg into the lungs every 6 hours as needed for wheezing.   ketoconazole (NIZORAL) 2 % external cream   Yes Yes   Sig: Apply topically daily as needed for itching or irritation. To feet   losartan (COZAAR) 25 MG tablet 3/11/2025 Evening  No Yes   Sig: Take 0.5 tablets (12.5 mg) by mouth every evening.   meclizine (ANTIVERT) 12.5 MG tablet   Yes Yes   Sig: Take 12.5 mg by mouth 3 times daily as needed for dizziness.   metoprolol succinate ER (TOPROL XL) 25 MG 24 hr tablet 3/12/2025 Morning  No Yes   Sig: Take 1 tablet (25 mg) by mouth daily.   omeprazole (PRILOSEC) 20 MG DR capsule   Yes Yes   Sig: Take 20 mg by mouth daily as needed.   polyethylene glycol (MIRALAX) 17 g packet   Yes Yes   Sig: Take 1 packet by mouth daily as needed for constipation.   polyethylene glycol-propylene glycol (SYSTANE ULTRA) 0.4-0.3 % SOLN ophthalmic solution   Yes Yes   Sig: Place 1-2 drops into both eyes 4 times daily as needed for dry eyes.   sodium chloride (OCEAN) 0.65 % nasal spray 3/12/2025 Morning  Yes Yes   Sig: Spray 1 spray into both nostrils 2 times daily.      Facility-Administered Medications: None       HOME MEDICATIONS:  Prior to Admission Medications   Prescriptions Last Dose Informant Patient Reported? Taking?   TRELEGY ELLIPTA 100-62.5-25 MCG/ACT oral inhaler 3/12/2025 Morning  Yes Yes   Sig: Inhale 2 puffs into the lungs daily   albuterol (PROAIR HFA/PROVENTIL HFA/VENTOLIN HFA) 108 (90 Base) MCG/ACT inhaler   Yes Yes   Sig: Inhale 2  puffs into the lungs at bedtime. And Q6H as needed   apixaban ANTICOAGULANT (ELIQUIS) 2.5 MG tablet 3/12/2025 Morning  Yes Yes   Sig: Take 2.5 mg by mouth 2 times daily.   atorvastatin (LIPITOR) 10 MG tablet 3/12/2025 Morning  No Yes   Sig: Take 1 tablet (10 mg) by mouth daily.   budesonide (PULMICORT) 0.5 MG/2ML neb solution   Yes Yes   Sig: Take 0.5 mg by nebulization daily as needed (short of breath, wheezing).   calcium carbonate (OS-SHINE) 1500 (600 Ca) MG tablet 3/12/2025 Morning  Yes Yes   Sig: Take 600 mg by mouth daily.   cetirizine (ZYRTEC) 10 MG tablet 3/11/2025 Evening  Yes Yes   Sig: Take 10 mg by mouth every evening.   conjugated estrogens (PREMARIN) 0.625 MG/GM vaginal cream   Yes Yes   Sig: Place vaginally daily as needed.   docusate sodium (DSS) 100 MG capsule 3/11/2025  Yes Yes   Sig: Take 1 capsule by mouth every evening.   fish oil-omega-3 fatty acids 1000 MG capsule 3/12/2025 Morning  Yes Yes   Sig: Take 1 g by mouth 2 times daily.   fluticasone (FLONASE) 50 MCG/ACT nasal spray 3/12/2025 Morning  Yes Yes   Sig: Spray 1 spray into both nostrils 2 times daily.   ipratropium (ATROVENT) 0.02 % neb solution   Yes Yes   Sig: Inhale 0.5 mg into the lungs every 6 hours as needed for wheezing.   ketoconazole (NIZORAL) 2 % external cream   Yes Yes   Sig: Apply topically daily as needed for itching or irritation. To feet   losartan (COZAAR) 25 MG tablet 3/11/2025 Evening  No Yes   Sig: Take 0.5 tablets (12.5 mg) by mouth every evening.   meclizine (ANTIVERT) 12.5 MG tablet   Yes Yes   Sig: Take 12.5 mg by mouth 3 times daily as needed for dizziness.   metoprolol succinate ER (TOPROL XL) 25 MG 24 hr tablet 3/12/2025 Morning  No Yes   Sig: Take 1 tablet (25 mg) by mouth daily.   omeprazole (PRILOSEC) 20 MG DR capsule   Yes Yes   Sig: Take 20 mg by mouth daily as needed.   polyethylene glycol (MIRALAX) 17 g packet   Yes Yes   Sig: Take 1 packet by mouth daily as needed for constipation.   polyethylene  glycol-propylene glycol (SYSTANE ULTRA) 0.4-0.3 % SOLN ophthalmic solution   Yes Yes   Sig: Place 1-2 drops into both eyes 4 times daily as needed for dry eyes.   sodium chloride (OCEAN) 0.65 % nasal spray 3/12/2025 Morning  Yes Yes   Sig: Spray 1 spray into both nostrils 2 times daily.      Facility-Administered Medications: None       ALLERGIES:  Allergies   Allergen Reactions    Sulfa Antibiotics Unknown     Long time ago, she cannot remember    Diazepam Other (See Comments)     Makes more anxious    Meperidine Nausea and Vomiting and Unknown    Morphine Nausea and Unknown    Penicillins Itching    Zafirlukast      She does not remember       PAST MEDICAL HISTORY:  No past medical history on file.    PAST SURGICAL HISTORY:  Past Surgical History:   Procedure Laterality Date    CV CORONARY ANGIOGRAM N/A 3/7/2025    Procedure: Coronary Angiogram;  Surgeon: Darryl Chris MD;  Location:  HEART CARDIAC CATH LAB    CV LEFT HEART CATH N/A 3/7/2025    Procedure: Left Heart Catheterization;  Surgeon: Darryl Chris MD;  Location:  HEART CARDIAC CATH LAB    CV LEFT VENTRICULOGRAM N/A 3/7/2025    Procedure: Left Ventriculogram;  Surgeon: Darryl Chris MD;  Location:  HEART CARDIAC CATH LAB       SOCIAL HISTORY:   Margareth Hogue  reports that she has never smoked. She has never used smokeless tobacco.      PHYSICAL EXAMINATION:  Temp: 98.2  F (36.8  C) Temp src: Oral BP: 117/68 Pulse: 72   Resp: 16 SpO2: 93 % O2 Device: None (Room air)    03/08 0700 - 03/13 0659  In: -   Out: 3450 [Urine:3450]  Net: -3450  Vitals:    03/12/25 1931 03/13/25 0300   Weight: 61.6 kg (135 lb 12.9 oz) 58.7 kg (129 lb 8 oz)       Clinically Significant Risk Factors Present on Admission         # Hyponatremia: Lowest Na = 130 mmol/L in last 2 days, will monitor as appropriate        # Drug Induced Coagulation Defect: home medication list includes an anticoagulant medication    # Hypertension: Home medication list  includes antihypertensive(s)  # Acute heart failure with reduced ejection fraction: last echo with EF <40% and receiving IV diuretics              # Financial/Environmental Concerns:    # Asthma: noted on problem list       Cardiac Arrhythmia: Atrial fibrillation: Paroxysmal  Cardiomyopathy  Systolic acute    Mothilal Camila Rich MD, MD    This note was completed in part using dictation via the Dragon voice recognition software. Some word and grammatical errors may occur and must be interpreted in the appropriate clinical context. If there are any questions pertaining to this issue, please contact me for further clarification.

## 2025-03-13 NOTE — PLAN OF CARE
Goal Outcome Evaluation:         Patient has been discharged March 13, 2025 4:29 PM. Discharge instructions and education reviewed, medication schedule and follow up appts discussed. Pt had no questions. All belongings sent with pt.   PIV removed and tele boxed returned. Discharge med sent to pt's preferred pharmacy.  for transport to home

## 2025-03-13 NOTE — PROGRESS NOTES
Observation goals  PRIOR TO DISCHARGE        Comments: -diagnostic tests and consults completed and resulted not met  -vital signs normal or at patient baseline met  Nurse to notify provider when observation goals have been met and patient is ready for discharge.

## 2025-03-13 NOTE — PROGRESS NOTES
Observation goals  PRIOR TO DISCHARGE       Comments: -diagnostic tests and consults completed and resulted- not met  -vital signs normal or at patient baseline-not met  Nurse to notify provider when observation goals have been met and patient is ready for discharge.

## 2025-03-13 NOTE — PROGRESS NOTES
Observation goals  PRIOR TO DISCHARGE        Comments: -diagnostic tests and consults completed and resulted not met: pending cardiology note  -vital signs normal or at patient baseline met  Nurse to notify provider when observation goals have been met and patient is ready for discharge.

## 2025-03-13 NOTE — DISCHARGE SUMMARY
[facility]  Hospitalist Discharge Summary      Date of Admission:  3/12/2025  Date of Discharge:  3/13/2025  Discharging Provider: Nik Saavedra NP  Discharge Service: Hospitalist Service    Discharge Diagnoses   Acute heart failure with reduced ejection fraction  Dyspnea  Chest tightness    Clinically Significant Risk Factors          Follow-ups Needed After Discharge   Follow-up Appointments       Follow Up      Follow up with Cardiology. for hospital follow- up. No follow up labs or test are needed.                Discharge Disposition   Discharged to home  Condition at discharge: Stable    Hospital Course      Margareth Hogue is a 79 year old female with a hx of parx a.fib on Elquis, asthma, GERD, recent hospital admission 3/5-3/8 after fall found to have NSTEMI, possible Takotsubo CM who presents for evaluation of chest tightness and shortness of breath, admitted under observation for suspected acute HFpEF    Suspect acute HFrEF  Dyspnea/chest tightness  Recent diagnosis of possible Takotsubo cardiomyopathy (TTE: LVEF of 35-40%)  *recent hospital admission from 3/5-3/8 after a fall.. Found to have NSTEMI. Underwent cardiac cath (3/7)which showed normal coronaries, LV sever apical ballooning with sparing of base LVEF 35-40%, typical for Takotsubo.  TTE showed LVEF 35-40% moderate to severe anterior, septal, and apical wall hypokinesis. Discharged on Metoprolol XL and Losartan. Prior to admission Diltiazem discontinued.   *patient now presenting with c/o chest tightness/sob/dizziness while eating lunch. Symptoms have improved upon arrival.  *labs notable for Na 130, BNP 4754 (511 6days ago), Troponin flat 41-41, (648-->400s on 3/6).  Initial EKG showed SR with TWI in the inferolateral leads, repeat EKG showed SR and no longer T wave inversion. She has received nitroglycerin in the ED for reported chest pain and also 1L IVF.   *CXR shows w small bilateral pleural effusions. Peripheral  pulmonary vessels appear mildly prominent therefore this most likely represents CHF.   *given elevated proBNP and chest xrray finding suspect some component of HF contributing. Trop is flat.   - Lasix 40mg IV BID x 2 doses ordered; transitioned to oral lasix 20 mg daily at discharge  - Cardiology consulted, recommendations as follows:   - Changed losartan 12.5 mg to valsartan 40 mg daily  - 20 mg lasix daily  - Continue PTA Apixaban and metoprolol XL   - No repeat echo recommended, as had one on 3/7  - Will have outpatient follow up with Cardiology ANTONY    Hypertension  - Meds as above Losartan/BB     History of paroxysmal A-fib  - PTA on BB above and Eliquis     History of dyslipidemia  - Continue atorvastatin 10 mg daily     h/o CVA/TIA  - Atorvastatin 10 mg daily and apixaban 2.5 mg twice daily     History of asthma  No acute issues at this time. no wheezing  - On Breo and Incruse inhaler     GERD  - Resume PTA PPI     History of sarcoidosis, lung  - noted    Consultations This Hospital Stay   CARDIOLOGY IP CONSULT  VASCULAR ACCESS ADULT IP CONSULT  CARE MANAGEMENT / SOCIAL WORK IP CONSULT    Code Status   Full Code    Time Spent on this Encounter   INik NP, personally saw the patient today and spent greater than 30 minutes discharging this patient.       Nik Saavedra NP  [facility]  [address] [place] [id number]  Phone: [phone]  ______________________________________________________________________    Physical Exam   Vital Signs: Temp: 98.2  F (36.8  C) Temp src: Oral BP: 117/68 Pulse: 72   Resp: 16 SpO2: 93 % O2 Device: None (Room air)    Weight: 129 lbs 8 oz  General:  Appears stated age, no acute distress. A&O x 3.  Skin:  Warm, dry. No rashes or lesions on exposed skin.  HEENT:  Normocephalic, atraumatic.  Neck:  Supple.  Chest:  Breath sounds CTA and no increased work of breathing on room air.  Cardiovascular:  RRR, no  rub or murmur. No peripheral edema.  Abdomen:  Soft, non-tender, non-distended.  Musculoskeletal:  Moves all four extremities.   Neurological:  No foal neurological deficits.   Psychiatric:  Affect and mood congruent.         Primary Care Physician   Nadira Rob    Discharge Orders      Reason for your hospital stay    You were admitted with shortness of breath and chest tightness. You were found to have acute heart failure exacerbation. You were given IV diuretics to assist you to get rid of the extra fluid your heart wasn't able to do. You responded well to the diuretics, and your weight dropped by 6 lbs. You were evaluated by Cardiology who made some medication adjustments. You will follow up with Cardiology outpatient.     Activity    Your activity upon discharge: activity as tolerated     Follow Up    Follow up with Cardiology. for hospital follow- up. No follow up labs or test are needed.     Diet    Follow this diet upon discharge: Current Diet:Orders Placed This Encounter      Combination Diet Low Saturated Fat Na <2400mg Diet       Significant Results and Procedures   Most Recent 3 CBC's:  Recent Labs   Lab Test 03/13/25  0724 03/12/25  1325 03/06/25  0618   WBC 6.0 6.0 9.3   HGB 11.0* 11.1* 11.0*   MCV 86 87 85    291 308     Most Recent 3 BMP's:  Recent Labs   Lab Test 03/13/25  0724 03/12/25  1325 03/06/25  0618   * 130* 130*   POTASSIUM 4.1 3.9 4.0   CHLORIDE 100 99 99   CO2 24 21* 21*   BUN 20.1 13.3 15.1   CR 0.69 0.65 0.60   ANIONGAP 8 10 10   SHINE 8.9 8.9 8.4*   GLC 70 137* 91   ,   Results for orders placed or performed during the hospital encounter of 03/12/25   Chest XR,  PA & LAT    Narrative    EXAM: XR CHEST 2 VIEWS  LOCATION: [facility]  DATE: 3/12/2025    INDICATION: CP  COMPARISON: 3/5/2025      Impression    IMPRESSION: Bilateral shoulder prostheses. Thoracolumbar scoliosis. Calcified mediastinal nodes.    New small bilateral pleural  effusions. Peripheral pulmonary vessels appear mildly prominent therefore this most likely represents CHF.       Discharge Medications   Current Discharge Medication List        START taking these medications    Details   furosemide (LASIX) 20 MG tablet Take 1 tablet (20 mg) by mouth every morning.  Qty: 30 tablet, Refills: 0    Associated Diagnoses: Heart failure with reduced ejection fraction, NYHA class I (H)      valsartan (DIOVAN) 40 MG tablet Take 1 tablet (40 mg) by mouth every evening.  Qty: 30 tablet, Refills: 0    Associated Diagnoses: Heart failure with reduced ejection fraction, NYHA class I (H)           CONTINUE these medications which have NOT CHANGED    Details   albuterol (PROAIR HFA/PROVENTIL HFA/VENTOLIN HFA) 108 (90 Base) MCG/ACT inhaler Inhale 2 puffs into the lungs at bedtime. And Q6H as needed      apixaban ANTICOAGULANT (ELIQUIS) 2.5 MG tablet Take 2.5 mg by mouth 2 times daily.      atorvastatin (LIPITOR) 10 MG tablet Take 1 tablet (10 mg) by mouth daily.  Qty: 90 tablet, Refills: 3    Associated Diagnoses: Hyperlipidemia LDL goal <130      budesonide (PULMICORT) 0.5 MG/2ML neb solution Take 0.5 mg by nebulization daily as needed (short of breath, wheezing).      calcium carbonate (OS-SHINE) 1500 (600 Ca) MG tablet Take 600 mg by mouth daily.      cetirizine (ZYRTEC) 10 MG tablet Take 10 mg by mouth every evening.      conjugated estrogens (PREMARIN) 0.625 MG/GM vaginal cream Place vaginally daily as needed.      docusate sodium (DSS) 100 MG capsule Take 1 capsule by mouth every evening.      fish oil-omega-3 fatty acids 1000 MG capsule Take 1 g by mouth 2 times daily.      fluticasone (FLONASE) 50 MCG/ACT nasal spray Spray 1 spray into both nostrils 2 times daily.      ipratropium (ATROVENT) 0.02 % neb solution Inhale 0.5 mg into the lungs every 6 hours as needed for wheezing.      ketoconazole (NIZORAL) 2 % external cream Apply topically daily as needed for itching or irritation. To feet       meclizine (ANTIVERT) 12.5 MG tablet Take 12.5 mg by mouth 3 times daily as needed for dizziness.      metoprolol succinate ER (TOPROL XL) 25 MG 24 hr tablet Take 1 tablet (25 mg) by mouth daily.  Qty: 30 tablet, Refills: 2    Associated Diagnoses: Takotsubo cardiomyopathy      omeprazole (PRILOSEC) 20 MG DR capsule Take 20 mg by mouth daily as needed.      polyethylene glycol (MIRALAX) 17 g packet Take 1 packet by mouth daily as needed for constipation.      polyethylene glycol-propylene glycol (SYSTANE ULTRA) 0.4-0.3 % SOLN ophthalmic solution Place 1-2 drops into both eyes 4 times daily as needed for dry eyes.      sodium chloride (OCEAN) 0.65 % nasal spray Spray 1 spray into both nostrils 2 times daily.      TRELEGY ELLIPTA 100-62.5-25 MCG/ACT oral inhaler Inhale 2 puffs into the lungs daily           STOP taking these medications       losartan (COZAAR) 25 MG tablet Comments:   Reason for Stopping:             Allergies   Allergies   Allergen Reactions    Sulfa Antibiotics Unknown     Long time ago, she cannot remember    Diazepam Other (See Comments)     Makes more anxious    Meperidine Nausea and Vomiting and Unknown    Morphine Nausea and Unknown    Penicillins Itching    Zafirlukast      She does not remember

## 2025-03-14 ENCOUNTER — HOSPITAL ENCOUNTER (OUTPATIENT)
Dept: CARDIAC REHAB | Facility: CLINIC | Age: 80
Discharge: HOME | End: 2025-03-14
Attending: INTERNAL MEDICINE
Payer: COMMERCIAL

## 2025-03-14 PROCEDURE — 93798 PHYS/QHP OP CAR RHAB W/ECG: CPT | Performed by: REHABILITATION PRACTITIONER

## 2025-03-15 ENCOUNTER — PATIENT OUTREACH (OUTPATIENT)
Dept: CARE COORDINATION | Facility: CLINIC | Age: 80
End: 2025-03-15
Payer: COMMERCIAL

## 2025-03-15 NOTE — PROGRESS NOTES
Connected Care Resource Center:   [facility] Resource Center Contact  [facility]/Voicemail     Clinical Data: Post-Discharge Outreach     Outreach attempted x 2.  Left message on patient's voicemail, providing [facility]'s central phone number of [phone] ([phone]) for questions/concerns and/or to schedule an appt with an [facility] provider, if they do not have a PCP.      Plan:  General acute hospital will do no further outreaches at this time.       Yomaira Patrick MA  Connected Care Resource Center, [facility]    *Connected Care Resource Team does NOT follow patient ongoing. Referrals are identified based on internal discharge reports and the outreach is to ensure patient has an understanding of their discharge instructions.

## 2025-03-17 ENCOUNTER — HOSPITAL ENCOUNTER (OUTPATIENT)
Dept: CARDIAC REHAB | Facility: CLINIC | Age: 80
Discharge: HOME | End: 2025-03-17
Attending: INTERNAL MEDICINE
Payer: COMMERCIAL

## 2025-03-17 PROCEDURE — 93798 PHYS/QHP OP CAR RHAB W/ECG: CPT

## 2025-03-18 ENCOUNTER — TRANSFERRED RECORDS (OUTPATIENT)
Dept: HEALTH INFORMATION MANAGEMENT | Facility: CLINIC | Age: 80
End: 2025-03-18
Payer: COMMERCIAL

## 2025-03-19 ENCOUNTER — HOSPITAL ENCOUNTER (OUTPATIENT)
Dept: CARDIAC REHAB | Facility: CLINIC | Age: 80
Discharge: HOME | End: 2025-03-19
Attending: INTERNAL MEDICINE
Payer: COMMERCIAL

## 2025-03-19 PROCEDURE — 93798 PHYS/QHP OP CAR RHAB W/ECG: CPT | Performed by: REHABILITATION PRACTITIONER

## 2025-03-21 ENCOUNTER — HOSPITAL ENCOUNTER (OUTPATIENT)
Dept: CARDIAC REHAB | Facility: CLINIC | Age: 80
Discharge: HOME | End: 2025-03-21
Attending: INTERNAL MEDICINE
Payer: COMMERCIAL

## 2025-03-21 PROCEDURE — 93798 PHYS/QHP OP CAR RHAB W/ECG: CPT | Performed by: REHABILITATION PRACTITIONER

## 2025-03-24 ENCOUNTER — HOSPITAL ENCOUNTER (OUTPATIENT)
Dept: CARDIAC REHAB | Facility: CLINIC | Age: 80
Discharge: HOME | End: 2025-03-24
Attending: INTERNAL MEDICINE
Payer: COMMERCIAL

## 2025-03-24 ENCOUNTER — TELEPHONE (OUTPATIENT)
Dept: CARDIAC REHAB | Facility: CLINIC | Age: 80
End: 2025-03-24
Payer: COMMERCIAL

## 2025-03-24 PROCEDURE — 93798 PHYS/QHP OP CAR RHAB W/ECG: CPT | Performed by: OCCUPATIONAL THERAPIST

## 2025-03-24 NOTE — TELEPHONE ENCOUNTER
SINCERE  PT noted to have BBB for the entire session in rehab today. This was not present at initial evaluation however appears intermittently in several followup sessions. Previous 12 leads display BBB in the early hospitalization. She denies all CV sx and reports feeling generally well overall. HR and BP consistently WNL's. Plan to continue cardiac rehab with close monitoring of EKG rhythms unless otherwise directed.

## 2025-03-24 NOTE — TELEPHONE ENCOUNTER
Patient was recently hospitalized at [facility] from 3/12/25 through 3/13/25 due to acute heart failure with reduced ejection fraction, dyspnea and chest tightness. Recent admission 3/5/25-3/8/25 after fall and found to have NSTEMI, possible Takotsubo cardiomyopathy. Patient underwent coronary angiogram on 3/5/25 without indication for mechanical revascularization.    Cardiology consulted during recent hospitalization with below recommendations:              - Changed losartan 12.5 mg to valsartan 40 mg daily  - 20 mg lasix daily  - Continue PTA Apixaban and metoprolol XL for Afib  - No repeat echo recommended, as had one on 3/7/25  - Will have outpatient follow up with Cardiology ANTONY (scheduled with Shelli Yuen 4/4/35)      Dr. Rich is out of the office all week, OT updated routed to covering provider, Dr. Lara for review.

## 2025-03-24 NOTE — TELEPHONE ENCOUNTER
Eunice Lara MD  You; [facility] Heart Team 222 minutes ago (1:37 PM)     Agree with plan to continue cardiac rehab with EKG monitoring. No change in plan.  I reviewed recent coronary angiogram result which showed no obstructive CAD.    Thank you,  Eunice Lara MD on 3/24/2025 at 1:36 PM      You  Eunice Lara MD; [facility] Heart Team 23 hours ago (10:24 AM)     Please see update from OT. Dr. Rich is out of office all week. Thank you!     ==========================================    Above message from Dr. Lara routed to Max Graves OT.

## 2025-03-27 ENCOUNTER — HOSPITAL ENCOUNTER (OUTPATIENT)
Facility: CLINIC | Age: 80
Setting detail: OBSERVATION
DRG: 291 | End: 2025-03-27
Admitting: HOSPITALIST
Payer: COMMERCIAL

## 2025-03-27 ENCOUNTER — APPOINTMENT (OUTPATIENT)
Dept: CT IMAGING | Facility: CLINIC | Age: 80
DRG: 291 | End: 2025-03-27
Payer: COMMERCIAL

## 2025-03-27 ENCOUNTER — TELEPHONE (OUTPATIENT)
Facility: CLINIC | Age: 80
End: 2025-03-27
Payer: COMMERCIAL

## 2025-03-27 ENCOUNTER — APPOINTMENT (OUTPATIENT)
Dept: GENERAL RADIOLOGY | Facility: CLINIC | Age: 80
DRG: 291 | End: 2025-03-27
Payer: COMMERCIAL

## 2025-03-27 ENCOUNTER — HOSPITAL ENCOUNTER (OUTPATIENT)
Dept: CARDIAC REHAB | Facility: CLINIC | Age: 80
Discharge: HOME | End: 2025-03-27
Attending: INTERNAL MEDICINE
Payer: COMMERCIAL

## 2025-03-27 VITALS
DIASTOLIC BLOOD PRESSURE: 60 MMHG | HEART RATE: 66 BPM | WEIGHT: 131 LBS | OXYGEN SATURATION: 96 % | BODY MASS INDEX: 23.21 KG/M2 | RESPIRATION RATE: 14 BRPM | SYSTOLIC BLOOD PRESSURE: 113 MMHG | TEMPERATURE: 97.4 F

## 2025-03-27 DIAGNOSIS — I50.20 HEART FAILURE WITH REDUCED EJECTION FRACTION, NYHA CLASS I (H): ICD-10-CM

## 2025-03-27 DIAGNOSIS — I10 BENIGN ESSENTIAL HYPERTENSION: ICD-10-CM

## 2025-03-27 DIAGNOSIS — R51.9 HEADACHE: ICD-10-CM

## 2025-03-27 DIAGNOSIS — R79.89 ELEVATED SERUM CREATININE: ICD-10-CM

## 2025-03-27 DIAGNOSIS — R94.31 NONSPECIFIC ABNORMAL ELECTROCARDIOGRAM (ECG) (EKG): Primary | ICD-10-CM

## 2025-03-27 DIAGNOSIS — R07.9 CHEST PAIN, UNSPECIFIED TYPE: ICD-10-CM

## 2025-03-27 LAB
ALBUMIN SERPL BCG-MCNC: 4.1 G/DL (ref 3.5–5.2)
ALP SERPL-CCNC: 78 U/L (ref 40–150)
ALT SERPL W P-5'-P-CCNC: 18 U/L (ref 0–50)
ANION GAP SERPL CALCULATED.3IONS-SCNC: 11 MMOL/L (ref 7–15)
AST SERPL W P-5'-P-CCNC: 28 U/L (ref 0–45)
ATRIAL RATE - MUSE: 68 BPM
BASOPHILS # BLD AUTO: 0 10E3/UL (ref 0–0.2)
BASOPHILS NFR BLD AUTO: 0 %
BILIRUB DIRECT SERPL-MCNC: <0.08 MG/DL (ref 0–0.3)
BILIRUB SERPL-MCNC: 0.3 MG/DL
BUN SERPL-MCNC: 25.5 MG/DL (ref 8–23)
CALCIUM SERPL-MCNC: 9.4 MG/DL (ref 8.8–10.4)
CHLORIDE SERPL-SCNC: 94 MMOL/L (ref 98–107)
CREAT SERPL-MCNC: 0.99 MG/DL (ref 0.51–0.95)
DIASTOLIC BLOOD PRESSURE - MUSE: NORMAL MMHG
EGFRCR SERPLBLD CKD-EPI 2021: 57 ML/MIN/1.73M2
EOSINOPHIL # BLD AUTO: 0.2 10E3/UL (ref 0–0.7)
EOSINOPHIL NFR BLD AUTO: 2 %
ERYTHROCYTE [DISTWIDTH] IN BLOOD BY AUTOMATED COUNT: 13.3 % (ref 10–15)
GLUCOSE SERPL-MCNC: 79 MG/DL (ref 70–99)
HCO3 SERPL-SCNC: 24 MMOL/L (ref 22–29)
HCT VFR BLD AUTO: 35.5 % (ref 35–47)
HGB BLD-MCNC: 12.2 G/DL (ref 11.7–15.7)
HOLD SPECIMEN: NORMAL
IMM GRANULOCYTES # BLD: 0 10E3/UL
IMM GRANULOCYTES NFR BLD: 0 %
INTERPRETATION ECG - MUSE: NORMAL
LIPASE SERPL-CCNC: 33 U/L (ref 13–60)
LYMPHOCYTES # BLD AUTO: 1.9 10E3/UL (ref 0.8–5.3)
LYMPHOCYTES NFR BLD AUTO: 28 %
MCH RBC QN AUTO: 28.9 PG (ref 26.5–33)
MCHC RBC AUTO-ENTMCNC: 34.4 G/DL (ref 31.5–36.5)
MCV RBC AUTO: 84 FL (ref 78–100)
MONOCYTES # BLD AUTO: 0.7 10E3/UL (ref 0–1.3)
MONOCYTES NFR BLD AUTO: 10 %
NEUTROPHILS # BLD AUTO: 4.1 10E3/UL (ref 1.6–8.3)
NEUTROPHILS NFR BLD AUTO: 60 %
NRBC # BLD AUTO: 0 10E3/UL
NRBC BLD AUTO-RTO: 0 /100
NT-PROBNP SERPL-MCNC: 885 PG/ML (ref 0–1800)
P AXIS - MUSE: 75 DEGREES
PLATELET # BLD AUTO: 322 10E3/UL (ref 150–450)
POTASSIUM SERPL-SCNC: 4.5 MMOL/L (ref 3.4–5.3)
PR INTERVAL - MUSE: 178 MS
PROT SERPL-MCNC: 6.6 G/DL (ref 6.4–8.3)
QRS DURATION - MUSE: 108 MS
QT - MUSE: 426 MS
QTC - MUSE: 452 MS
R AXIS - MUSE: 24 DEGREES
RBC # BLD AUTO: 4.22 10E6/UL (ref 3.8–5.2)
SODIUM SERPL-SCNC: 129 MMOL/L (ref 135–145)
SYSTOLIC BLOOD PRESSURE - MUSE: NORMAL MMHG
T AXIS - MUSE: 117 DEGREES
TROPONIN T SERPL HS-MCNC: 22 NG/L
TROPONIN T SERPL HS-MCNC: 24 NG/L
VENTRICULAR RATE- MUSE: 68 BPM
WBC # BLD AUTO: 6.8 10E3/UL (ref 4–11)

## 2025-03-27 PROCEDURE — 99285 EMERGENCY DEPT VISIT HI MDM: CPT | Mod: 25

## 2025-03-27 PROCEDURE — 258N000003 HC RX IP 258 OP 636: Performed by: HOSPITALIST

## 2025-03-27 PROCEDURE — 96360 HYDRATION IV INFUSION INIT: CPT

## 2025-03-27 PROCEDURE — 93005 ELECTROCARDIOGRAM TRACING: CPT | Mod: 76

## 2025-03-27 PROCEDURE — 83880 ASSAY OF NATRIURETIC PEPTIDE: CPT

## 2025-03-27 PROCEDURE — 250N000013 HC RX MED GY IP 250 OP 250 PS 637: Performed by: HOSPITALIST

## 2025-03-27 PROCEDURE — G0378 HOSPITAL OBSERVATION PER HR: HCPCS

## 2025-03-27 PROCEDURE — 93798 PHYS/QHP OP CAR RHAB W/ECG: CPT | Performed by: OCCUPATIONAL THERAPIST

## 2025-03-27 PROCEDURE — 80048 BASIC METABOLIC PNL TOTAL CA: CPT | Performed by: EMERGENCY MEDICINE

## 2025-03-27 PROCEDURE — 83690 ASSAY OF LIPASE: CPT

## 2025-03-27 PROCEDURE — 85004 AUTOMATED DIFF WBC COUNT: CPT | Performed by: EMERGENCY MEDICINE

## 2025-03-27 PROCEDURE — 99223 1ST HOSP IP/OBS HIGH 75: CPT | Performed by: HOSPITALIST

## 2025-03-27 PROCEDURE — 82248 BILIRUBIN DIRECT: CPT

## 2025-03-27 PROCEDURE — 93005 ELECTROCARDIOGRAM TRACING: CPT

## 2025-03-27 PROCEDURE — 71046 X-RAY EXAM CHEST 2 VIEWS: CPT

## 2025-03-27 PROCEDURE — 36415 COLL VENOUS BLD VENIPUNCTURE: CPT | Performed by: EMERGENCY MEDICINE

## 2025-03-27 PROCEDURE — 70450 CT HEAD/BRAIN W/O DYE: CPT

## 2025-03-27 PROCEDURE — 84484 ASSAY OF TROPONIN QUANT: CPT | Performed by: EMERGENCY MEDICINE

## 2025-03-27 RX ORDER — DOCUSATE SODIUM 100 MG/1
100 CAPSULE, LIQUID FILLED ORAL EVERY EVENING
Status: DISCONTINUED | OUTPATIENT
Start: 2025-03-27 | End: 2025-03-30 | Stop reason: HOSPADM

## 2025-03-27 RX ORDER — MAGNESIUM HYDROXIDE/ALUMINUM HYDROXICE/SIMETHICONE 120; 1200; 1200 MG/30ML; MG/30ML; MG/30ML
30 SUSPENSION ORAL EVERY 4 HOURS PRN
Status: DISCONTINUED | OUTPATIENT
Start: 2025-03-27 | End: 2025-03-30 | Stop reason: HOSPADM

## 2025-03-27 RX ORDER — ACETAMINOPHEN 650 MG/1
650 SUPPOSITORY RECTAL EVERY 4 HOURS PRN
Status: DISCONTINUED | OUTPATIENT
Start: 2025-03-27 | End: 2025-03-30 | Stop reason: HOSPADM

## 2025-03-27 RX ORDER — LIDOCAINE 40 MG/G
CREAM TOPICAL
Status: DISCONTINUED | OUTPATIENT
Start: 2025-03-27 | End: 2025-03-30 | Stop reason: HOSPADM

## 2025-03-27 RX ORDER — VALSARTAN 40 MG/1
40 TABLET ORAL EVERY EVENING
Status: DISCONTINUED | OUTPATIENT
Start: 2025-03-27 | End: 2025-03-30 | Stop reason: HOSPADM

## 2025-03-27 RX ORDER — CETIRIZINE HYDROCHLORIDE 10 MG/1
10 TABLET ORAL EVERY EVENING
Status: DISCONTINUED | OUTPATIENT
Start: 2025-03-27 | End: 2025-03-30 | Stop reason: HOSPADM

## 2025-03-27 RX ORDER — ALBUTEROL SULFATE 90 UG/1
2 INHALANT RESPIRATORY (INHALATION) AT BEDTIME
Status: DISCONTINUED | OUTPATIENT
Start: 2025-03-27 | End: 2025-03-30 | Stop reason: HOSPADM

## 2025-03-27 RX ORDER — ACETAMINOPHEN 325 MG/1
650 TABLET ORAL EVERY 4 HOURS PRN
Status: DISCONTINUED | OUTPATIENT
Start: 2025-03-27 | End: 2025-03-30 | Stop reason: HOSPADM

## 2025-03-27 RX ORDER — FUROSEMIDE 20 MG/1
20 TABLET ORAL EVERY MORNING
Status: DISCONTINUED | OUTPATIENT
Start: 2025-03-28 | End: 2025-03-29

## 2025-03-27 RX ORDER — NITROGLYCERIN 0.4 MG/1
0.4 TABLET SUBLINGUAL EVERY 5 MIN PRN
Status: DISCONTINUED | OUTPATIENT
Start: 2025-03-27 | End: 2025-03-30 | Stop reason: HOSPADM

## 2025-03-27 RX ORDER — ATORVASTATIN CALCIUM 10 MG/1
10 TABLET, FILM COATED ORAL DAILY
Status: DISCONTINUED | OUTPATIENT
Start: 2025-03-28 | End: 2025-03-30 | Stop reason: HOSPADM

## 2025-03-27 RX ORDER — SODIUM CHLORIDE 9 MG/ML
INJECTION, SOLUTION INTRAVENOUS CONTINUOUS
Status: DISCONTINUED | OUTPATIENT
Start: 2025-03-27 | End: 2025-03-28

## 2025-03-27 RX ORDER — FLUTICASONE FUROATE AND VILANTEROL 100; 25 UG/1; UG/1
1 POWDER RESPIRATORY (INHALATION) DAILY
Status: DISCONTINUED | OUTPATIENT
Start: 2025-03-28 | End: 2025-03-30 | Stop reason: HOSPADM

## 2025-03-27 RX ORDER — METOPROLOL SUCCINATE 25 MG/1
25 TABLET, EXTENDED RELEASE ORAL DAILY
Status: DISCONTINUED | OUTPATIENT
Start: 2025-03-28 | End: 2025-03-30 | Stop reason: HOSPADM

## 2025-03-27 RX ADMIN — CETIRIZINE HYDROCHLORIDE 10 MG: 10 TABLET, FILM COATED ORAL at 22:52

## 2025-03-27 RX ADMIN — APIXABAN 2.5 MG: 2.5 TABLET, FILM COATED ORAL at 22:52

## 2025-03-27 RX ADMIN — SODIUM CHLORIDE: 0.9 INJECTION, SOLUTION INTRAVENOUS at 22:53

## 2025-03-27 RX ADMIN — DOCUSATE SODIUM 100 MG: 100 CAPSULE, LIQUID FILLED ORAL at 22:52

## 2025-03-27 ASSESSMENT — ACTIVITIES OF DAILY LIVING (ADL)
ADLS_ACUITY_SCORE: 59
ADLS_ACUITY_SCORE: 47
ADLS_ACUITY_SCORE: 59

## 2025-03-27 NOTE — PHARMACY-ADMISSION MEDICATION HISTORY
Pharmacist Admission Medication History    Admission medication history is complete. The information provided in this note is only as accurate as the sources available at the time of the update.    Information Source(s): Patient, Family member, Hospital records, and CareEverywhere/SureScripts via in-person    Pertinent Information: None    Changes made to PTA medication list:  Added: None  Deleted: None  Changed: None      Medication History Completed By: Fabio Hammond LYNN 3/27/2025 5:49 PM    PTA Med List   Medication Sig Last Dose/Taking    albuterol (PROAIR HFA/PROVENTIL HFA/VENTOLIN HFA) 108 (90 Base) MCG/ACT inhaler Inhale 2 puffs into the lungs at bedtime. And Q6H as needed 3/26/2025    apixaban ANTICOAGULANT (ELIQUIS) 2.5 MG tablet Take 2.5 mg by mouth 2 times daily. 3/27/2025 Morning    atorvastatin (LIPITOR) 10 MG tablet Take 1 tablet (10 mg) by mouth daily. 3/27/2025 Morning    budesonide (PULMICORT) 0.5 MG/2ML neb solution Take 0.5 mg by nebulization daily as needed (short of breath, wheezing). Unknown    calcium carbonate (OS-SHINE) 1500 (600 Ca) MG tablet Take 600 mg by mouth daily. 3/27/2025 Morning    cetirizine (ZYRTEC) 10 MG tablet Take 10 mg by mouth every evening. 3/26/2025 Evening    conjugated estrogens (PREMARIN) 0.625 MG/GM vaginal cream Place vaginally daily as needed. Taking As Needed    docusate sodium (DSS) 100 MG capsule Take 1 capsule by mouth every evening. 3/26/2025 Evening    fish oil-omega-3 fatty acids 1000 MG capsule Take 1 g by mouth 2 times daily. 3/27/2025 Morning    fluticasone (FLONASE) 50 MCG/ACT nasal spray Spray 1 spray into both nostrils 2 times daily. 3/27/2025 Morning    furosemide (LASIX) 20 MG tablet Take 1 tablet (20 mg) by mouth every morning. 3/27/2025 Morning    ipratropium (ATROVENT) 0.02 % neb solution Inhale 0.5 mg into the lungs every 6 hours as needed for wheezing. Unknown    ketoconazole (NIZORAL) 2 % external cream Apply topically daily as needed for  itching or irritation. To feet Unknown    meclizine (ANTIVERT) 12.5 MG tablet Take 12.5 mg by mouth 3 times daily as needed for dizziness. Unknown    metoprolol succinate ER (TOPROL XL) 25 MG 24 hr tablet Take 1 tablet (25 mg) by mouth daily. 3/27/2025 Morning    omeprazole (PRILOSEC) 20 MG DR capsule Take 20 mg by mouth daily as needed. Unknown    polyethylene glycol (MIRALAX) 17 g packet Take 1 packet by mouth daily as needed for constipation. Unknown    polyethylene glycol-propylene glycol (SYSTANE ULTRA) 0.4-0.3 % SOLN ophthalmic solution Place 1-2 drops into both eyes 4 times daily as needed for dry eyes. Unknown    sodium chloride (OCEAN) 0.65 % nasal spray Spray 1 spray into both nostrils 2 times daily. 3/27/2025 Morning    TRELEGY ELLIPTA 100-62.5-25 MCG/ACT oral inhaler Inhale 2 puffs into the lungs daily 3/27/2025 Morning    valsartan (DIOVAN) 40 MG tablet Take 1 tablet (40 mg) by mouth every evening. 3/26/2025

## 2025-03-27 NOTE — H&P
[facility]    History and Physical - Hospitalist Service       Date of Admission:  3/27/2025    Assessment & Plan      Margareth Hogue is an 79 year old female with a history of recent acute heart failure with moderately reduced ejection fraction secondary to recently diagnosed stress-induced cardiomyopathy, hypertension, hyperlipidemia, paroxysmal atrial fibrillation, CVA, asthma, sarcoidosis, and GERD who presented to the ED for evaluation of chest pain.  Evaluation in the ED revealed new EKG changes and acute kidney injury.  The hospitalist service was contacted to bring her into the hospital for further evaluation and management.    Chest pain  Heart failure with reduced ejection fraction  Recent diagnosis of stress-induced cardiomyopathy  Hypertension  Hyperlipidemia  Recent hospital admission from March 6 - March 8 following a fall.  Concern for NSTEMI during that admission, underwent coronary angiogram which showed largely normal coronary arteries but also apical wall ballooning consistent with stress-induced cardiomyopathy.  Subsequently hospitalized from March 12 - March 13 due to CHF, discharged home with addition of lasix 20 mg daily.  She developed chest pain, nausea, and lightheadedness on the morning of presentation while using a stationary bike at cardiac rehab.  Symptoms improved but did not resolve, she was able to go home from cardiac rehab.  She called the clinic and was directed into the ED for evaluation.  Symptoms resolved by the time she arrived in the ED.  Workup in the ED included chest x-ray with some mild perihilar opacities, no other acute abnormalities; troponin of 24; EKG with some new T wave changes in the anterolateral leads.  Also noted to have a decreased sodium and increased creatinine.  , down from 4,754 two weeks ago.  [place] to observation.  Consult cardiology, appreciate their assistance.  Repeat troponin pending, follow up on  results.  Suspicion for cardiac etiology of pain is low given recent normal coronary arteries on angiogram earlier this month.  HOLD PTA valsartan and lasix for now in setting of ANGEL.  PTA atorvastatin on hold for now while in observation status.  Continue PTA Eliquis and Toprol XL.  Will check TTE.  Possible that EF has improved and diuretic requirement has decreased, leading to her presentation with ANGEL.    Acute kidney injury  Hyponatremia  Creatinine 0.99, up from baseline of ~ 0.6-0.7.  Sodium 129, down from low 130s earlier this month.  Suspect pre-renal/hypovolemic related to overdiuresis and valsartan use.  HOLD PTA lasix and valsartan.  IV fluids - 50 ml/hr for 10 hours.  Monitor intake and output.  BMP in AM.  Consider further work-up if creatinine not improving as expected with IV fluids.    Paroxysmal atrial fibrillation  Sinus rhythm on EKG in the ED.  Rate in the 60s.  Continue PTA apixaban and Toprol XL.    History of CVA  Continue PTA Eliquis as noted above.  PTA atorvastatin on hold while in observation status.    Asthma  History of sarcoidosis  No symptoms at the time of admission.  Continue PTA Trelegy or formulary equivalent.  Can resume other PTA inhalers/nebs if needed.    GERD  Continue PTA omeprazole or formulary equivalent.        Diet:  regular diet  DVT Prophylaxis: DOAC  Curry Catheter: Not present  Lines: None     Cardiac Monitoring: None  Code Status:  FULL CODE    Clinically Significant Risk Factors Present on Admission         # Hyponatremia: Lowest Na = 129 mmol/L in last 2 days, will monitor as appropriate  # Hypochloremia: Lowest Cl = 94 mmol/L in last 2 days, will monitor as appropriate         # Drug Induced Coagulation Defect: home medication list includes an anticoagulant medication    # Hypertension: Home medication list includes antihypertensive(s)  # Chronic heart failure with reduced ejection fraction: last echo with EF <40%              # Financial/Environmental Concerns:     # Asthma: noted on problem list        Disposition Plan     Medically Ready for Discharge: Anticipated Tomorrow           Jose Peña MD  Hospitalist Service  [facility]  Securely message with [employer] (more info)  Text page via [employer] Paging/Directory     ______________________________________________________________________    Chief Complaint   Chest pain    History is obtained from the patient    History of Present Illness   Margareth Hogue is an 79 year old female with a history of recent acute heart failure with moderately reduced ejection fraction secondary to recently diagnosed stress-induced cardiomyopathy, hypertension, hyperlipidemia, paroxysmal atrial fibrillation, CVA, asthma, sarcoidosis, and GERD who presented to the ED for evaluation of chest pain.  She was recently admitted to [facility] from March 5 through March 8 following a mechanical fall.  During that hospitalization there was concern for NSTEMI.  Coronary angiogram on 3/7/2025 showed normal coronary arteries, but left ventriculogram showed findings consistent with stress-induced cardiomyopathy.  She was transitioned from diltiazem to Toprol-XL and discharged home with plan for Holter monitor and cardiology follow-up.  She returned to the ED on March 12 due to chest pain and shortness of breath and was diagnosed with acute heart failure.  Symptoms improved with IV Lasix.  Cardiology changed her PTA losartan to valsartan and she was discharged home on oral Lasix 20 mg daily.  She started cardiac rehab and tolerated this well up until the day of presentation.  On March 27 she developed chest pain, nausea, and dizziness while exercising on a stationary bike at cardiac rehab.  She also has had a headache for most of the day.  Symptoms improved somewhat with rest and she was able to go home, however her pain had not resolved.  She called her clinic and was advised to come into the emergency department for  evaluation.    She was evaluated by Patti Sanders PA-C while in the ED.  Vitals were okay as documented in epic.  Labs showed mildly elevated troponin of 24, sodium 129, chloride 94, BUN 25.5, creatinine 0.99, proBNP 885, normal CBC.  Chest x-ray showed mild to moderate perihilar reticular opacities, known pulmonary nodules.  CT head showed no acute intracranial changes.  EKG showed some new T wave changes.  Due to the new EKG changes and increase in her creatinine, the hospitalist service was contacted to bring her into the hospital for further evaluation and management.    Past Medical History    Past Medical History:   Diagnosis Date    Asthma     GERD (gastroesophageal reflux disease)     Heart failure with reduced ejection fraction (H)     Paroxysmal atrial fibrillation (H)     Stress-induced cardiomyopathy      Past Surgical History   Past Surgical History:   Procedure Laterality Date    CV CORONARY ANGIOGRAM N/A 3/7/2025    Procedure: Coronary Angiogram;  Surgeon: Darryl Chris MD;  Location:  HEART CARDIAC CATH LAB    CV LEFT HEART CATH N/A 3/7/2025    Procedure: Left Heart Catheterization;  Surgeon: Darryl Chris MD;  Location: [facility] CARDIAC CATH LAB    CV LEFT VENTRICULOGRAM N/A 3/7/2025    Procedure: Left Ventriculogram;  Surgeon: Darryl Chris MD;  Location:  HEART CARDIAC CATH LAB     Prior to Admission Medications   Prior to Admission Medications   Prescriptions Last Dose Informant Patient Reported? Taking?   TRELEGY ELLIPTA 100-62.5-25 MCG/ACT oral inhaler 3/27/2025 Morning  Yes Yes   Sig: Inhale 2 puffs into the lungs daily   albuterol (PROAIR HFA/PROVENTIL HFA/VENTOLIN HFA) 108 (90 Base) MCG/ACT inhaler 3/26/2025  Yes Yes   Sig: Inhale 2 puffs into the lungs at bedtime. And Q6H as needed   apixaban ANTICOAGULANT (ELIQUIS) 2.5 MG tablet 3/27/2025 Morning  Yes Yes   Sig: Take 2.5 mg by mouth 2 times daily.   atorvastatin (LIPITOR) 10 MG tablet 3/27/2025 Morning  No Yes    Sig: Take 1 tablet (10 mg) by mouth daily.   budesonide (PULMICORT) 0.5 MG/2ML neb solution Unknown  Yes Yes   Sig: Take 0.5 mg by nebulization daily as needed (short of breath, wheezing).   calcium carbonate (OS-SHINE) 1500 (600 Ca) MG tablet 3/27/2025 Morning  Yes Yes   Sig: Take 600 mg by mouth daily.   cetirizine (ZYRTEC) 10 MG tablet 3/26/2025 Evening  Yes Yes   Sig: Take 10 mg by mouth every evening.   conjugated estrogens (PREMARIN) 0.625 MG/GM vaginal cream   Yes Yes   Sig: Place vaginally daily as needed.   docusate sodium (DSS) 100 MG capsule 3/26/2025 Evening  Yes Yes   Sig: Take 1 capsule by mouth every evening.   fish oil-omega-3 fatty acids 1000 MG capsule 3/27/2025 Morning  Yes Yes   Sig: Take 1 g by mouth 2 times daily.   fluticasone (FLONASE) 50 MCG/ACT nasal spray 3/27/2025 Morning  Yes Yes   Sig: Spray 1 spray into both nostrils 2 times daily.   furosemide (LASIX) 20 MG tablet 3/27/2025 Morning  No Yes   Sig: Take 1 tablet (20 mg) by mouth every morning.   ipratropium (ATROVENT) 0.02 % neb solution Unknown  Yes Yes   Sig: Inhale 0.5 mg into the lungs every 6 hours as needed for wheezing.   ketoconazole (NIZORAL) 2 % external cream Unknown  Yes Yes   Sig: Apply topically daily as needed for itching or irritation. To feet   meclizine (ANTIVERT) 12.5 MG tablet Unknown  Yes Yes   Sig: Take 12.5 mg by mouth 3 times daily as needed for dizziness.   metoprolol succinate ER (TOPROL XL) 25 MG 24 hr tablet 3/27/2025 Morning  No Yes   Sig: Take 1 tablet (25 mg) by mouth daily.   omeprazole (PRILOSEC) 20 MG DR capsule Unknown  Yes Yes   Sig: Take 20 mg by mouth daily as needed.   polyethylene glycol (MIRALAX) 17 g packet Unknown  Yes Yes   Sig: Take 1 packet by mouth daily as needed for constipation.   polyethylene glycol-propylene glycol (SYSTANE ULTRA) 0.4-0.3 % SOLN ophthalmic solution Unknown  Yes Yes   Sig: Place 1-2 drops into both eyes 4 times daily as needed for dry eyes.   sodium chloride (OCEAN)  0.65 % nasal spray 3/27/2025 Morning  Yes Yes   Sig: Spray 1 spray into both nostrils 2 times daily.   valsartan (DIOVAN) 40 MG tablet 3/26/2025  No Yes   Sig: Take 1 tablet (40 mg) by mouth every evening.      Facility-Administered Medications: None        Review of Systems    The 10 point Review of Systems is negative other than noted in the HPI or here.    Social History   I have reviewed this patient's social history and updated it with pertinent information if needed.  Social History     Tobacco Use    Smoking status: Never    Smokeless tobacco: Never     Allergies   Allergies   Allergen Reactions    Sulfa Antibiotics Unknown     Long time ago, she cannot remember    Diazepam Other (See Comments)     Makes more anxious    Meperidine Nausea and Vomiting and Unknown    Morphine Nausea and Unknown    Penicillins Itching    Zafirlukast      She does not remember        Physical Exam   Vital Signs: Temp: 98  F (36.7  C) Temp src: Temporal BP: 125/75 Pulse: 74   Resp: 28 SpO2: 95 %      Weight: 131 lbs 0 oz    Constitutional: awake, alert, cooperative, no apparent distress, sitting at the edge of the ED bed  Respiratory: no increased work of breathing, clear to auscultation bilaterally, no crackles or wheezing  Cardiovascular: regular rate and rhythm, normal S1 and S2, no murmur noted  GI: normal bowel sounds, soft, non-distended, non-tender  Skin: warm, dry  Musculoskeletal: no lower extremity pitting edema present  Neurologic: awake, alert, answers questions appropriately, moves all extremities    Medical Decision Making       80 MINUTES SPENT BY ME on the date of service doing chart review, history, exam, documentation & further activities per the note.      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 Mild to moderate perihilar reticular opacities are seen which may suggest atypical pneumonia. Consider further characterization with CT chest exam. Known pulmonary nodules are better seen on the   prior CT chest exam from 3/6/2025. 9 mm nodular density in the left lower lung was not definitively seen on the prior exam. This nodule can be further characterized on the CT exam suggested above. Multiple calcified granulomas are noted in the   mediastinum. Normal cardiomediastinal silhouette. Multilevel degenerative changes are seen in the spine with scoliotic curvature.   Head CT w/o contrast    Narrative    EXAM: CT HEAD W/O CONTRAST  LOCATION: [facility]  DATE/TIME: 3/27/2025 3:42 PM CDT    INDICATION: Headache and nausea  COMPARISON: CT head dated 3/5/2025  TECHNIQUE: Routine CT Head without IV contrast. Multiplanar reformats. Dose reduction techniques were used.    FINDINGS:   INTRACRANIAL CONTENTS: No acute intracranial hemorrhage. No CT evidence of acute infarct. Sequelae of mild chronic microangiopathy. Mild cerebral volume loss without hydrocephalus. No extra-axial fluid collections.  Patent basal cisterns.     VISUALIZED ORBITS/SINUSES/MASTOIDS: Postoperative changes of bilateral lenses, otherwise the orbits are unremarkable. The visualized paranasal sinuses and temporal bone structures are well-aerated.     BONES/SOFT TISSUES: Probable right frontal bone hemangioma, otherwise the calvarium and skull base are unremarkable.       Impression    IMPRESSION:     1. Senescent changes and sequelae of chronic microangiopathy without acute intracranial  abnormality.

## 2025-03-27 NOTE — TELEPHONE ENCOUNTER
"Spoke with patient  She stated that at cardiac rehab she had to stop due to dizziness, quick onset of nausea and chest pressure  EKG completed and no acute findings  Since returning home, feels \"better\" but lingering pressure noted  Patient also states she has been watching her weight, but is noticing abdominal edema and swelling in breasts. No change in weight however.   Patient endorsed waking from sleep short of breath and needing to sleep in recliner  Feeling extremely fatigued as well    Does not sound dyspneic at time of call. However, continues to endorse \"fluid\" in areas noted above    Due to sx and concern for fluid overload- patient advised to return to ED for evaluation and possible IV diuresing.   Patient agreeable to plan     Carlos Akhtar RN on 3/27/2025 at 12:37 PM       "

## 2025-03-27 NOTE — ED NOTES
Writer RN poked patient x2, and patient pulled arm away, causing IV to come out. Unable to obtain labs at this time.

## 2025-03-27 NOTE — ED NOTES
[facility]    ED Nurse Handoff Report    ED Chief complaint: Chest Pain      ED Diagnosis:   Final diagnoses:   Nonspecific abnormal electrocardiogram (ECG) (EKG)   Elevated serum creatinine       Code Status: Full Code    Allergies:   Allergies   Allergen Reactions    Sulfa Antibiotics Unknown     Long time ago, she cannot remember    Diazepam Other (See Comments)     Makes more anxious    Meperidine Nausea and Vomiting and Unknown    Morphine Nausea and Unknown    Penicillins Itching    Zafirlukast      She does not remember       Patient Story:  Pt presents to ER after having an episode of chest pain while at rehab exercising. She reported chest pain, nausea and sob at the time, by the time she arrived to ER she reports her chest pain resolved but says chest pressure continues.   Focused Assessment:    Pt is A&Ox4, independent, can make needs known. EKG abnormal. Pt reports some dizziness which occurs frequently. Patient's creatinine mildly elevated.     Labs Ordered and Resulted from Time of ED Arrival to Time of ED Departure   BASIC METABOLIC PANEL - Abnormal       Result Value    Sodium 129 (*)     Potassium 4.5      Chloride 94 (*)     Carbon Dioxide (CO2) 24      Anion Gap 11      Urea Nitrogen 25.5 (*)     Creatinine 0.99 (*)     GFR Estimate 57 (*)     Calcium 9.4      Glucose 79     TROPONIN T, HIGH SENSITIVITY - Abnormal    Troponin T, High Sensitivity 24 (*)    NT PROBNP INPATIENT - Normal    N terminal Pro BNP Inpatient 885     LIPASE - Normal    Lipase 33     HEPATIC FUNCTION PANEL - Normal    Protein Total 6.6      Albumin 4.1      Bilirubin Total 0.3      Alkaline Phosphatase 78      AST 28      ALT 18      Bilirubin Direct <0.08     CBC WITH PLATELETS AND DIFFERENTIAL    WBC Count 6.8      RBC Count 4.22      Hemoglobin 12.2      Hematocrit 35.5      MCV 84      MCH 28.9      MCHC 34.4      RDW 13.3      Platelet Count 322      % Neutrophils 60      % Lymphocytes 28      %  Monocytes 10      % Eosinophils 2      % Basophils 0      % Immature Granulocytes 0      NRBCs per 100 WBC 0      Absolute Neutrophils 4.1      Absolute Lymphocytes 1.9      Absolute Monocytes 0.7      Absolute Eosinophils 0.2      Absolute Basophils 0.0      Absolute Immature Granulocytes 0.0      Absolute NRBCs 0.0     TROPONIN T, HIGH SENSITIVITY       Head CT w/o contrast   Final Result   IMPRESSION:       1. Senescent changes and sequelae of chronic microangiopathy without acute intracranial abnormality.      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 Mild to moderate perihilar reticular opacities are seen which may suggest atypical pneumonia. Consider further characterization with CT chest exam. Known pulmonary nodules are better seen on the    prior CT chest exam from 3/6/2025. 9 mm nodular density in the left lower lung was not definitively seen on the prior exam. This nodule can be further characterized on the CT exam suggested above. Multiple calcified granulomas are noted in the    mediastinum. Normal cardiomediastinal silhouette. Multilevel degenerative changes are seen in the spine with scoliotic curvature.            Treatments and/or interventions provided:      Medications - No data to display    Patient's response to treatments and/or interventions:    Pt resting in bed    To be done/followed up on inpatient unit:   See any in-patient orders    Does this patient have any cognitive concerns?: none    Activity level - Baseline/Home:    Independent    Activity Level - Current:    Independent    Patient's Preferred language: English     Needed?: No    Isolation: None  Infection: Not Applicable  Patient tested for COVID 19 prior to admission: NO    Bariatric?: No    Vital Signs:   Vitals:    03/27/25 1334 03/27/25 1509 03/27/25 1539 03/27/25 1629   BP: 119/79 (!) 148/78 123/83    Pulse: 64 69 67    Resp: 17      Temp: 98  F (36.7  C)       TempSrc: Temporal      SpO2: 99% 99% 99% 99%   Weight: 59.4 kg (131 lb)          Cardiac Rhythm:     Was the PSS-3 completed:   Yes  What interventions are required if any?               Family Comments: none  OBS brochure/video discussed/provided to patient/family: N/A              Name of person given brochure if not patient:               Relationship to patient:     For the majority of the shift this patient's behavior was Green.  Behavioral interventions performed were .    ED NURSE PHONE NUMBER: *[phone]

## 2025-03-27 NOTE — ED TRIAGE NOTES
Pt presents to ed to be evaluated for chest pain.   Pt has a hx of heart failure, afib, and NSTEMI, and today was at rehab exercising when she had sudden onset of chest pain, nausea, and sob. Pt states that after resting for a bit, sx improved, but still has mild chest pressure.      Triage Assessment (Adult)       Row Name 03/27/25 5589          Triage Assessment    Airway WDL WDL        Respiratory WDL    Respiratory WDL WDL        Cardiac WDL    Cardiac WDL chest pain

## 2025-03-27 NOTE — TELEPHONE ENCOUNTER
Health Call Center    Phone Message    May a detailed message be left on voicemail: yes    Reason for Call: Symptoms or Concerns     If patient has red-flag symptoms, warm transfer to triage line    Current symptom or concern: Lightheaded, nausea, and chest discomfort    Symptoms have been present for: Half an hour    Patient called requesting to speak with a nurse in regards to her symptoms. I attempted to contact the triage line, but there was no answer. Please call patient back ASAP to address.    Thank you!  [facility]

## 2025-03-27 NOTE — ED PROVIDER NOTES
ED APC SUPERVISION NOTE:   I evaluated this patient in conjunction with Patti Sanders PA-C  I have participated in the care of the patient and personally performed key elements of the history, exam, and medical decision making.      HPI:   Margareth Hogue is a 79 year old female with a complicated recent history including cardiomyopathy and NSTEMI.  While at cardiac rehab today she developed chest discomfort lightheadedness and nausea.  She was subsequently referred here for further evaluation.    Independent Historian:   None    Review of External Notes: Recent discharge summary for Takotsubo cardiomyopathy was reviewed     EXAM:   /75   Pulse 74   Temp 98  F (36.7  C) (Temporal)   Resp 28   Wt 59.4 kg (131 lb)   LMP  (LMP Unknown)   SpO2 95%   BMI 23.21 kg/m     General: Alert and interactive  Cardiovascular: Regular rate and rhythm, trace pedal edema  Lungs: Clear to auscultation  Neuro: Normal strength in all 4 extremities normal mentation    Labs Ordered and Resulted from Time of ED Arrival to Time of ED Departure   BASIC METABOLIC PANEL - Abnormal       Result Value    Sodium 129 (*)     Potassium 4.5      Chloride 94 (*)     Carbon Dioxide (CO2) 24      Anion Gap 11      Urea Nitrogen 25.5 (*)     Creatinine 0.99 (*)     GFR Estimate 57 (*)     Calcium 9.4      Glucose 79     TROPONIN T, HIGH SENSITIVITY - Abnormal    Troponin T, High Sensitivity 24 (*)    NT PROBNP INPATIENT - Normal    N terminal Pro BNP Inpatient 885     LIPASE - Normal    Lipase 33     HEPATIC FUNCTION PANEL - Normal    Protein Total 6.6      Albumin 4.1      Bilirubin Total 0.3      Alkaline Phosphatase 78      AST 28      ALT 18      Bilirubin Direct <0.08     CBC WITH PLATELETS AND DIFFERENTIAL    WBC Count 6.8      RBC Count 4.22      Hemoglobin 12.2      Hematocrit 35.5      MCV 84      MCH 28.9      MCHC 34.4      RDW 13.3      Platelet Count 322      % Neutrophils 60      % Lymphocytes 28      % Monocytes 10      %  Eosinophils 2      % Basophils 0      % Immature Granulocytes 0      NRBCs per 100 WBC 0      Absolute Neutrophils 4.1      Absolute Lymphocytes 1.9      Absolute Monocytes 0.7      Absolute Eosinophils 0.2      Absolute Basophils 0.0      Absolute Immature Granulocytes 0.0      Absolute NRBCs 0.0     TROPONIN T, HIGH SENSITIVITY        Head CT w/o contrast   Final Result   IMPRESSION:       1. Senescent changes and sequelae of chronic microangiopathy without acute intracranial abnormality.      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 Mild to moderate perihilar reticular opacities are seen which may suggest atypical pneumonia. Consider further characterization with CT chest exam. Known pulmonary nodules are better seen on the    prior CT chest exam from 3/6/2025. 9 mm nodular density in the left lower lung was not definitively seen on the prior exam. This nodule can be further characterized on the CT exam suggested above. Multiple calcified granulomas are noted in the    mediastinum. Normal cardiomediastinal silhouette. Multilevel degenerative changes are seen in the spine with scoliotic curvature.           Independent Interpretation (X-rays, CTs, rhythm strip):  CXR: No pneumothorax.  CT Head: No intracranial hemorrhage or midline shift.    Consultations/Discussion of Management or Tests:  RUBIO Sanders discussed case with the admitting hospitalist     MIPS:      None    MEDICAL DECISION MAKING/ASSESSMENT AND PLAN:   Follow presentation history and physical examination are performed, the above workup was undertaken.  Patient does have new findings of inverted T waves in the anterior aspect of her EKG.  Her troponin is slightly elevated however downtrending.  She is anticoagulated and I doubt pulmonary embolism.  She is also demonstrating signs of mild acute kidney injury as well as hyponatremia, her care teams have been manipulating her diuretic and she may be  slightly over diuresed.  Given the complicated nature of her symptoms she will be brought into the hospital on observation cares for further evaluation and treatment.  She is chest pain-free at this time and meets no criteria for activation of the cardiac Cath Lab.     DIAGNOSIS:     ICD-10-CM    1. Nonspecific abnormal electrocardiogram (ECG) (EKG)  R94.31       2. Elevated serum creatinine  R79.89       3. Headache  R51.9             Scribe Disclosure:  I, Kamala Romero, am serving as a scribe at 6:31 PM on 3/27/2025 to document services personally performed by Patti Sanders PA-C based on my observations and the provider's statements to me.   3/27/2025  [facility] EMERGENCY DEPT       Trigger, Rayshawn Sanabria MD  03/27/25 1910

## 2025-03-27 NOTE — ED PROVIDER NOTES
"Emergency Department Note      History of Present Illness   Chief Complaint  Chest Pain    HPI  Margareth Hogue is a 79 year old female, anticoagulated on Eliquis, with a history of atrial fibrillation, NSTEMI, and TIA who presents to the ED for chest pain. The patient states that today at 0800, she was at cardio rehab doing an exercise on the stationary bike, when she developed sudden nausea and chest pain. She went home and called her clinic, who advised her to be seen in the ED. She does still currently have some slight chest pressure that radiates to her back. Patient does have a history of similar chest pain, which she experiences often. She adds that \"it got to be so constant, I just live with it\". She has been hospitalized a few times within the past month for cardiac related issues. Patient further reports experiencing dizziness this morning, and she states it is still currently there. She has a history of similar dizziness, which occurs often. She was told during one of her recent hospitalization that this issue could be caused by fluid retention. Patient also had a headache this morning prior to rehab. She took a tylenol this morning, but she still currently has the headache. She adds that she has had persistent issues with headaches since her fall and suspects it could be exacerbated by stiffness she has in her neck. She deneis any shortness of breath, vomiting, or vision changes.    Independent Historian  None    Review of External Notes  I reviewed telephone note from today -- patient at Cardiac rehab and had sudden onset of dizziness, nausea and chest pressure. EKG without acute changes.  She went home and felt better but reported persisting chest pressure.    Past Medical History   Medical History and Problem List  Acute hyponatremia   Allergic conjunctivitis of both eyes  Bilateral sensorineural hearing loss  Chronic insomnia  GERD  Adenomatous polyp of colon  Renal cell " carcinoma  Hyperlipidemia  IBS  Moderate persistent asthma  Paroxysmal atrial fibrillation  Primary osteoarthritis involving multiple joints  Sarcoidosis, lung  Seasonal allergies  TIA  Facial paresthesia  NSTEMI    Medications  Lipitor  Lasix  Toprol  Diovan  Eliquis  Antivert  Prilosec    Surgical History   Past Surgical History:   Procedure Laterality Date    CV CORONARY ANGIOGRAM N/A 3/7/2025    Procedure: Coronary Angiogram;  Surgeon: Darryl Chris MD;  Location:  HEART CARDIAC CATH LAB    CV LEFT HEART CATH N/A 3/7/2025    Procedure: Left Heart Catheterization;  Surgeon: Darryl Chris MD;  Location:  HEART CARDIAC CATH LAB    CV LEFT VENTRICULOGRAM N/A 3/7/2025    Procedure: Left Ventriculogram;  Surgeon: Darryl Chris MD;  Location:  HEART CARDIAC CATH LAB     Physical Exam   Patient Vitals for the past 24 hrs:   BP Temp Temp src Pulse Resp SpO2 Weight   03/27/25 1629 -- -- -- -- -- 99 % --   03/27/25 1539 123/83 -- -- 67 -- 99 % --   03/27/25 1509 (!) 148/78 -- -- 69 -- 99 % --   03/27/25 1334 119/79 98  F (36.7  C) Temporal 64 17 99 % 59.4 kg (131 lb)     Physical Exam  /83   Pulse 67   Temp 98  F (36.7  C) (Temporal)   Resp 17   Wt 59.4 kg (131 lb)   LMP  (LMP Unknown)   SpO2 99%   BMI 23.21 kg/m     General: No acute distress. Accompanied by  Juan.  Head: Atraumatic.   EENT: Moist mucus membranes.   CV: Regular rate and rhythm.   Respiratory: Breathing comfortably on room air. Lungs clear to auscultation bilaterally without wheezes, rhonchi, or rales.  GI: Soft, non-distended. Non-tender abdomen. No rebound, rigidity, or guarding.   Msk: Extremities without tenderness to palpation or deformity.  No calf swelling or tenderness bilaterally.  Skin: Warm and dry. No rashes.  Neuro: Awake, alert, and conversant. No focal neurologic deficits.      Diagnostics   Lab Results   Labs Ordered and Resulted from Time of ED Arrival to Time of ED Departure   BASIC  METABOLIC PANEL - Abnormal       Result Value    Sodium 129 (*)     Potassium 4.5      Chloride 94 (*)     Carbon Dioxide (CO2) 24      Anion Gap 11      Urea Nitrogen 25.5 (*)     Creatinine 0.99 (*)     GFR Estimate 57 (*)     Calcium 9.4      Glucose 79     TROPONIN T, HIGH SENSITIVITY - Abnormal    Troponin T, High Sensitivity 24 (*)    NT PROBNP INPATIENT - Normal    N terminal Pro BNP Inpatient 885     LIPASE - Normal    Lipase 33     HEPATIC FUNCTION PANEL - Normal    Protein Total 6.6      Albumin 4.1      Bilirubin Total 0.3      Alkaline Phosphatase 78      AST 28      ALT 18      Bilirubin Direct <0.08     CBC WITH PLATELETS AND DIFFERENTIAL    WBC Count 6.8      RBC Count 4.22      Hemoglobin 12.2      Hematocrit 35.5      MCV 84      MCH 28.9      MCHC 34.4      RDW 13.3      Platelet Count 322      % Neutrophils 60      % Lymphocytes 28      % Monocytes 10      % Eosinophils 2      % Basophils 0      % Immature Granulocytes 0      NRBCs per 100 WBC 0      Absolute Neutrophils 4.1      Absolute Lymphocytes 1.9      Absolute Monocytes 0.7      Absolute Eosinophils 0.2      Absolute Basophils 0.0      Absolute Immature Granulocytes 0.0      Absolute NRBCs 0.0     TROPONIN T, HIGH SENSITIVITY       Imaging  Head CT w/o contrast   Final Result   IMPRESSION:       1. Senescent changes and sequelae of chronic microangiopathy without acute intracranial abnormality.      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 Mild to moderate perihilar reticular opacities are seen which may suggest atypical pneumonia. Consider further characterization with CT chest exam. Known pulmonary nodules are better seen on the    prior CT chest exam from 3/6/2025. 9 mm nodular density in the left lower lung was not definitively seen on the prior exam. This nodule can be further characterized on the CT exam suggested above. Multiple calcified granulomas are noted in the     mediastinum. Normal cardiomediastinal silhouette. Multilevel degenerative changes are seen in the spine with scoliotic curvature.          EKG #1   ECG taken at 1331, ECG read at 1705  Normal sinus rhythm  Incomplete right bundle branch block  T wave abnormality, consider lateral ischemia  Abnormal ECG   Rate 68 bpm. MA interval 178 ms. QRS duration 108 ms. QT/QTc 426/452 ms. P-R-T axes 75 24 117.    EKG #2  ECG taken at 1645, ECG read at 1705  Normal sinus rhythm  T wave abnormality, consider lateral ischemia  Prolonged QT  Abnormal ECG   Rate 68 bpm. MA interval 194 ms. QRS duration 106 ms. QT/QTc 444/472 ms. P-R-T axes 66 28 115.       Independent Interpretation  CXR: No pneumothorax, infiltrate, pleural effusion, or pulmonary edema.  CT Head: No intracranial hemorrhage.    ED Course    Medications Administered  Medications - No data to display    Procedures  Procedures     Discussion of Management  Admitting Hospitalist, Dr. Peña  Staffed with Dr. Pierson    Social Determinants of Health adding to complexity of care  None    ED Course  ED Course as of 03/27/25 1827   u Mar 27, 2025   1506 I obtained history and examined the patient as noted above.     1628 I rechecked the patient.   1638 I staffed the patient with Dr. Pierson.   1647 I rechecked the patient.   1706 I rechecked the patient.   1816 I consulted with Dr. Peña of the hospitalist service and discussed patient admission. They accepted care of the patient.     Medical Decision Making / Diagnosis   CMS Diagnoses: None    MIPS     None    [facility]  Margareth Hogue is a 79 year old female with two recent admissions for NSTEMI and heart failure presenting today for evaluation of chest pain with onset during cardiac rehab today.  Workup today most notable for nonspecific EKG changes including inversions of the T waves in leads I, II, and V2 concerning for lateral ischemic changes, though she does not have active changes or pain at this time, thus did not  initiate heparin, as I do not suspect ACS.  She is anticoagulated, do not suspect PE and history is not suggestive of aortic dissection.  X-ray does show vague reticular opacities, but she has no signs or symptoms of pneumonia at this time.  Did repeat an EKG here to confirm these changes and it was stable.  Additionally, she was noted to have a bump in her creatinine and signs of overdiuresis.  She was started on diuretics during her last visit.  She may be over diuresed.  She is following strict fluid restriction as well.  She has a single kidney and with creatinine increased, as well as EKG changes with recent hospitalizations, feel patient would benefit from admission to the hospital for serial troponins, EKGs, adjustment of her diuretics.    Disposition  The patient was admitted to the hospital.     ICD-10 Codes:    ICD-10-CM    1. Nonspecific abnormal electrocardiogram (ECG) (EKG)  R94.31       2. Elevated serum creatinine  R79.89       3. Headache  R51.9              Patti Sanders PA-C  March 27, 2025   [employer]        Patti Sanders PA-C  03/27/25 1827

## 2025-03-28 ENCOUNTER — APPOINTMENT (OUTPATIENT)
Dept: CARDIOLOGY | Facility: CLINIC | Age: 80
DRG: 291 | End: 2025-03-28
Attending: HOSPITALIST
Payer: COMMERCIAL

## 2025-03-28 ENCOUNTER — APPOINTMENT (OUTPATIENT)
Dept: ULTRASOUND IMAGING | Facility: CLINIC | Age: 80
DRG: 291 | End: 2025-03-28
Attending: INTERNAL MEDICINE
Payer: COMMERCIAL

## 2025-03-28 LAB
ALBUMIN UR-MCNC: NEGATIVE MG/DL
ANION GAP SERPL CALCULATED.3IONS-SCNC: 7 MMOL/L (ref 7–15)
ANION GAP SERPL CALCULATED.3IONS-SCNC: 9 MMOL/L (ref 7–15)
APPEARANCE UR: CLEAR
BILIRUB UR QL STRIP: NEGATIVE
BUN SERPL-MCNC: 18.5 MG/DL (ref 8–23)
BUN SERPL-MCNC: 18.6 MG/DL (ref 8–23)
CALCIUM SERPL-MCNC: 8.8 MG/DL (ref 8.8–10.4)
CALCIUM SERPL-MCNC: 8.8 MG/DL (ref 8.8–10.4)
CHLORIDE SERPL-SCNC: 95 MMOL/L (ref 98–107)
CHLORIDE SERPL-SCNC: 97 MMOL/L (ref 98–107)
COLOR UR AUTO: NORMAL
CREAT SERPL-MCNC: 0.6 MG/DL (ref 0.51–0.95)
CREAT SERPL-MCNC: 0.63 MG/DL (ref 0.51–0.95)
EGFRCR SERPLBLD CKD-EPI 2021: 89 ML/MIN/1.73M2
EGFRCR SERPLBLD CKD-EPI 2021: 90 ML/MIN/1.73M2
GLUCOSE BLDC GLUCOMTR-MCNC: 89 MG/DL (ref 70–99)
GLUCOSE SERPL-MCNC: 110 MG/DL (ref 70–99)
GLUCOSE SERPL-MCNC: 97 MG/DL (ref 70–99)
GLUCOSE UR STRIP-MCNC: NEGATIVE MG/DL
HCO3 SERPL-SCNC: 23 MMOL/L (ref 22–29)
HCO3 SERPL-SCNC: 23 MMOL/L (ref 22–29)
HGB UR QL STRIP: NEGATIVE
KETONES UR STRIP-MCNC: NEGATIVE MG/DL
LEUKOCYTE ESTERASE UR QL STRIP: NEGATIVE
LVEF ECHO: NORMAL
MAGNESIUM SERPL-MCNC: 2.1 MG/DL (ref 1.7–2.3)
NITRATE UR QL: NEGATIVE
PH UR STRIP: 6.5 [PH] (ref 5–7)
POTASSIUM SERPL-SCNC: 4.2 MMOL/L (ref 3.4–5.3)
POTASSIUM SERPL-SCNC: 4.4 MMOL/L (ref 3.4–5.3)
SODIUM SERPL-SCNC: 127 MMOL/L (ref 135–145)
SODIUM SERPL-SCNC: 127 MMOL/L (ref 135–145)
SODIUM UR-SCNC: 23 MMOL/L
SP GR UR STRIP: 1.01 (ref 1–1.03)
T4 FREE SERPL-MCNC: 1.13 NG/DL (ref 0.9–1.7)
TSH SERPL DL<=0.005 MIU/L-ACNC: 4.55 UIU/ML (ref 0.3–4.2)
UROBILINOGEN UR STRIP-MCNC: NORMAL MG/DL

## 2025-03-28 PROCEDURE — 96361 HYDRATE IV INFUSION ADD-ON: CPT

## 2025-03-28 PROCEDURE — 99222 1ST HOSP IP/OBS MODERATE 55: CPT | Mod: 25 | Performed by: INTERNAL MEDICINE

## 2025-03-28 PROCEDURE — 82310 ASSAY OF CALCIUM: CPT | Performed by: INTERNAL MEDICINE

## 2025-03-28 PROCEDURE — 93325 DOPPLER ECHO COLOR FLOW MAPG: CPT

## 2025-03-28 PROCEDURE — 93308 TTE F-UP OR LMTD: CPT | Mod: 26 | Performed by: INTERNAL MEDICINE

## 2025-03-28 PROCEDURE — 93321 DOPPLER ECHO F-UP/LMTD STD: CPT | Mod: 26 | Performed by: INTERNAL MEDICINE

## 2025-03-28 PROCEDURE — 84439 ASSAY OF FREE THYROXINE: CPT | Performed by: HOSPITALIST

## 2025-03-28 PROCEDURE — 76705 ECHO EXAM OF ABDOMEN: CPT

## 2025-03-28 PROCEDURE — G0378 HOSPITAL OBSERVATION PER HR: HCPCS

## 2025-03-28 PROCEDURE — 250N000013 HC RX MED GY IP 250 OP 250 PS 637: Performed by: HOSPITALIST

## 2025-03-28 PROCEDURE — 99233 SBSQ HOSP IP/OBS HIGH 50: CPT | Performed by: HOSPITALIST

## 2025-03-28 PROCEDURE — 84443 ASSAY THYROID STIM HORMONE: CPT | Performed by: HOSPITALIST

## 2025-03-28 PROCEDURE — 36415 COLL VENOUS BLD VENIPUNCTURE: CPT | Performed by: HOSPITALIST

## 2025-03-28 PROCEDURE — 82310 ASSAY OF CALCIUM: CPT | Performed by: HOSPITALIST

## 2025-03-28 PROCEDURE — 80048 BASIC METABOLIC PNL TOTAL CA: CPT | Performed by: INTERNAL MEDICINE

## 2025-03-28 PROCEDURE — 80048 BASIC METABOLIC PNL TOTAL CA: CPT | Performed by: HOSPITALIST

## 2025-03-28 PROCEDURE — 36415 COLL VENOUS BLD VENIPUNCTURE: CPT | Performed by: INTERNAL MEDICINE

## 2025-03-28 PROCEDURE — 255N000002 HC RX 255 OP 636: Performed by: HOSPITALIST

## 2025-03-28 PROCEDURE — 81003 URINALYSIS AUTO W/O SCOPE: CPT | Performed by: HOSPITALIST

## 2025-03-28 PROCEDURE — 93325 DOPPLER ECHO COLOR FLOW MAPG: CPT | Mod: 26 | Performed by: INTERNAL MEDICINE

## 2025-03-28 PROCEDURE — 82962 GLUCOSE BLOOD TEST: CPT

## 2025-03-28 PROCEDURE — 84300 ASSAY OF URINE SODIUM: CPT | Performed by: HOSPITALIST

## 2025-03-28 PROCEDURE — 83735 ASSAY OF MAGNESIUM: CPT | Performed by: HOSPITALIST

## 2025-03-28 RX ADMIN — ALBUTEROL SULFATE 2 PUFF: 108 INHALANT RESPIRATORY (INHALATION) at 21:06

## 2025-03-28 RX ADMIN — ALBUTEROL SULFATE 2 PUFF: 108 INHALANT RESPIRATORY (INHALATION) at 00:31

## 2025-03-28 RX ADMIN — CETIRIZINE HYDROCHLORIDE 10 MG: 10 TABLET, FILM COATED ORAL at 21:06

## 2025-03-28 RX ADMIN — ACETAMINOPHEN 650 MG: 325 TABLET ORAL at 01:58

## 2025-03-28 RX ADMIN — APIXABAN 2.5 MG: 2.5 TABLET, FILM COATED ORAL at 21:06

## 2025-03-28 RX ADMIN — APIXABAN 2.5 MG: 2.5 TABLET, FILM COATED ORAL at 09:32

## 2025-03-28 RX ADMIN — METOPROLOL SUCCINATE 25 MG: 25 TABLET, EXTENDED RELEASE ORAL at 09:32

## 2025-03-28 RX ADMIN — DOCUSATE SODIUM 100 MG: 100 CAPSULE, LIQUID FILLED ORAL at 21:06

## 2025-03-28 RX ADMIN — PERFLUTREN 1.5 ML: 6.52 INJECTION, SUSPENSION INTRAVENOUS at 12:24

## 2025-03-28 ASSESSMENT — ACTIVITIES OF DAILY LIVING (ADL)
ADLS_ACUITY_SCORE: 47

## 2025-03-28 NOTE — PLAN OF CARE
PRIMARY Concern: admitted for chest pain. Found to have acute kidney injury  SAFETY RISK Concerns (fall risk, behaviors, etc.): Fall risk      Aggression Tool Color: Green  Isolation/Type: N/A  Tests/Procedures for NEXT shift: Am labs   Consults? (Pending/following, signed-off?) cardiology consult   Where is patient from? (Home, TCU, etc.): Home with spouse.  Other Important info for NEXT shift: dizziness upon standing   Anticipated DC date & active delays: TBD. Pending clinical improvement   SUMMARY NOTE:   Orientation/Cognitive: AXO4  Observation Goals (Met/ Not Met): OBS  Mobility Level/Assist Equipment: SBA refused walker   Antibiotics & Plan (IV/po, length of tx left): N/A  Pain Management: Complained of headache. Tylenol given    Complete Pain Reassessment: Y/N  Due next: shift   Tele/VS/O2: VSS on RA  ABNL Lab/BG: See flowsheet  Diet: Reg no caffeine   Bowel/Bladder: Cont B/B  Skin Concerns: dry skin   Drains/Devices: PIV infusing   Patient Stated Goal for Today: sleep

## 2025-03-28 NOTE — PROGRESS NOTES
PRIOR TO DISCHARGE        Comments: List all goals to be met before discharge home:  - Serial troponins and stress test complete. Met. (No stress test ordered.)   - Seen and cleared by consultant if applicable. Not met   - Adequate pain control on oral analgesia. Met   - Vital signs normal or at patient baseline. Met   - Safe disposition plan has been identified. Not met   - Nurse to notify provider when observation goals have been met and patient is ready for discharge.

## 2025-03-28 NOTE — PROGRESS NOTES
[facility]  Hospitalist Progress Note   03/28/2025          Assessment and Plan:       Margareth Hogue is an 79 year old female with a history of recent acute heart failure with moderately reduced ejection fraction secondary to recently diagnosed stress-induced cardiomyopathy, hypertension, hyperlipidemia, paroxysmal atrial fibrillation, CVA, asthma, sarcoidosis, and GERD admitted on 3/27/2025 for chest pain     Admitted 3/6 -3/8 following a fall.  Concern for NSTEMI during that admission, underwent coronary angiogram which showed largely normal coronary arteries but also apical wall ballooning consistent with stress-induced cardiomyopathy.    Readmitted 3/12 - 3/13 due to CHF, discharged home with addition of lasix 20 mg daily.    Chest pain - Resolved.   Dizziness with orthostatic vitals.   Heart failure with reduced ejection fraction  Recent diagnosis of stress-induced cardiomyopathy  Hypertension. Hyperlipidemia  Report developed chest pain, nausea, and lightheadedness on the morning of presentation while using a stationary bike at cardiac rehab.  Symptoms improved but did not resolve, she was able to go home from cardiac rehab.  She called the clinic and was directed into the ED for evaluation.  Symptoms resolved by the time she arrived in the ED.    -Chest x-ray with some mild perihilar opacities, no other acute abnormalities;   Troponin of 24 >22a; EKG with some new T wave changes in the anterolateral leads.    ProBNP 885, down from 4,754 two weeks ago.  --3/28 patient receiving IV fluids, admits to improvement in chest pain.    Continue to hold PTA valsartan, Lasix for now in the setting of dizziness, orthostatic vitals and ANGEL.  Hold PTA Lipitor while under observation status.  Continue PTA Eliquis.  Continue PTA Toprol XL with hold parameters.  Follow echocardiogram.  Telemetry monitoring.  Intake output monitoring, daily weights.  Cardiology consulted.  Appreciate input  Compression  stockings.    Acute kidney injury  Creatinine 0.99, up from baseline of ~ 0.6-0.7.  Hold PTA Lasix, valsartan.  Abdominal ultrasound pending  UA pending.  BMP in AM.    Hypochloremic hyponatremia.  Presented with sodium of 129, down from low 130s earlier this month.  This morning sodium 127 with IV fluids.  Hold IV fluids, encourage oral fluid intake.  Liberalize salt in diet.  Hold PTA lasix and valsartan.  If worsening sodium will consider nephrology evaluation.  Follow TSH, magnesium levels.   Urine sodium.    Paroxysmal atrial fibrillation  Sinus rhythm on EKG in the ED.  Rate in the 60s.  Continue PTA apixaban and Toprol XL.     History of CVA  Continue PTA Eliquis as noted above.  PTA atorvastatin on hold while in observation status.     Asthma  History of sarcoidosis  No symptoms at the time of admission.  Continue PTA Trelegy or formulary equivalent.  Can resume other PTA inhalers/nebs if needed.     GERD  Continue PTA omeprazole or formulary equivalent.    Physical deconditioning from medical illness, senile frailty.  Lives at home with her .  PT evaluation requested.  Fall precautions       Clinically Significant Risk Factors Present on Admission         # Hyponatremia: Lowest Na = 127 mmol/L in last 2 days, will monitor as appropriate  # Hypochloremia: Lowest Cl = 94 mmol/L in last 2 days, will monitor as appropriate       # Drug Induced Coagulation Defect: home medication list includes an anticoagulant medication    # Hypertension: Home medication list includes antihypertensive(s)            # Financial/Environmental Concerns:    # Asthma: noted on problem list      Orders Placed This Encounter      Combination Diet Regular Diet Adult      DVT Prophylaxis: SCDs, ambulate  Code Status: Full Code  Disposition: Expected discharge in 1 to 2 days pending clinical improvement.  Will request to our review for inpatient    Medically Ready for Discharge: Anticipated Tomorrow     Discussed with patient,  bedside RN, care team.  >51 minutes spent by me on the date of service doing chart review, history, exam, documentation & further activities per the note.     Ascencion Godfrey MD        Interval History:        Patient lying in bed.  Complaining of dizziness upon standing.  Reports lethargic, unable to sleep overnight.  Alert oriented can answer most questions.  Reports received Tylenol earlier today.  Admits to improvement in chest pain.  Denies any new tingling or numbness.  Denies any focal weakness.  Telemetry sinus rhythm.    Noted positive orthostatic blood pressures.         Physical Exam:        Physical Exam   Temp:  [97  F (36.1  C)-98.4  F (36.9  C)] 98.4  F (36.9  C)  Pulse:  [62-75] 67  Resp:  [14-28] 16  BP: (113-125)/(57-75) 123/71  SpO2:  [95 %-99 %] 97 %    Intake/Output Summary (Last 24 hours) at 3/28/2025 1821  Last data filed at 3/28/2025 1300  Gross per 24 hour   Intake 540 ml   Output 1000 ml   Net -460 ml       Admission Weight: 59.4 kg (131 lb)  Current Weight: 59.4 kg (131 lb)    PHYSICAL EXAM  GENERAL: Patient is in no distress. Alert and oriented.  HEENT: Oropharynx pink, no nystagmus.  HEART: Regular rate and rhythm. S1S2. No murmurs  LUNGS: Clear to auscultation bilaterally. No expiratory wheeze.  Respirations unlabored  ABDOMEN: Soft, no abdominal tenderness, bowel sounds heard   NEURO: Moving all extremities, no focal weakness.  EXTREMITIES: No pedal edema.   SKIN: Warm, dry. No rash   PSYCHIATRY Cooperative       Medications:        Current Facility-Administered Medications   Medication Dose Route Frequency Provider Last Rate Last Admin    albuterol (PROVENTIL HFA/VENTOLIN HFA) inhaler  2 puff Inhalation At Bedtime Jose Peña MD   2 puff at 03/28/25 0031    apixaban ANTICOAGULANT (ELIQUIS) tablet 2.5 mg  2.5 mg Oral BID Jose Peña MD   2.5 mg at 03/28/25 0932    [Held by provider] atorvastatin (LIPITOR) tablet 10 mg  10 mg Oral Daily Jose Peña  MD Franck        cetirizine (zyrTEC) tablet 10 mg  10 mg Oral QPM Jose Peña MD   10 mg at 03/27/25 2252    docusate sodium (COLACE) capsule 100 mg  100 mg Oral QPM Jose Peña MD   100 mg at 03/27/25 2252    fluticasone-vilanterol (BREO ELLIPTA) 100-25 MCG/ACT inhaler 1 puff  1 puff Inhalation Daily Jose Peña MD        And    umeclidinium (INCRUSE ELLIPTA) 62.5 MCG/ACT inhaler 1 puff  1 puff Inhalation Daily Jose Peña MD        [Held by provider] furosemide (LASIX) tablet 20 mg  20 mg Oral QAM Jose Peña MD        metoprolol succinate ER (TOPROL XL) 24 hr tablet 25 mg  25 mg Oral Daily Jose Peña MD   25 mg at 03/28/25 0932    sodium chloride (PF) 0.9% PF flush 3 mL  3 mL Intracatheter Q8H VANESSA Jose Peña MD   3 mL at 03/28/25 1330    [Held by provider] valsartan (DIOVAN) tablet 40 mg  40 mg Oral QPM Jose Peña MD         Current Facility-Administered Medications   Medication Dose Route Frequency Provider Last Rate Last Admin    acetaminophen (TYLENOL) tablet 650 mg  650 mg Oral Q4H PRN Jose Peña MD   650 mg at 03/28/25 0158    Or    acetaminophen (TYLENOL) Suppository 650 mg  650 mg Rectal Q4H PRN Jose Peña MD        alum & mag hydroxide-simethicone (MAALOX) suspension 30 mL  30 mL Oral Q4H PRN Jose Peña MD        lidocaine (LMX4) cream   Topical Q1H PRN Jose Peña MD        lidocaine 1 % 0.1-1 mL  0.1-1 mL Other Q1H PRN Jose Peña MD        nitroGLYcerin (NITROSTAT) sublingual tablet 0.4 mg  0.4 mg Sublingual Q5 Min PRN Jose Peña MD        sodium chloride (PF) 0.9% PF flush 3 mL  3 mL Intracatheter q1 min prn Jose Peña MD   3 mL at 03/28/25 0942            Data:      All new lab and imaging data was reviewed.

## 2025-03-28 NOTE — CONSULTS
[facility]    Cardiology Consultation     Date of Admission:  3/27/2025    Assessment & Plan     This is a 79 year old female with a history of recent acute heart failure with moderately reduced ejection fraction secondary to recently diagnosed stress-induced cardiomyopathy, hypertension, hyperlipidemia, paroxysmal atrial fibrillation, CVA, asthma, sarcoidosis, and GERD who presents with chest pain and rising Cr. Other labs notable for TSH elevation, TN to 24, Na 127, Cl 95, and Cr 0.99. NTproBNP 885 (down from 4700). ECG with inferolateral TW changes. Currently CP free. Cr improved. Echocardiogram shows improved LVEF. She does report abdominal swelling and leg swelling however, would consider abd ultrasound.     Recommendations:    1. Abd ultrasound, recheck chem panel this afternoon   2. No need for heparin infusion given recent coronary angiogram   3. Agree with ANGEL / hyponatremia assessment being from diuresis, hold on active diuretic at this time.   4. Continue eliquis for PAF, monitor telemetry  5. Continue metoprolol   6. Cardiology will sign off and follow up with patient as outpatient.  Outpatient orders have been placed.    Sapphire Harris MD MSC    High complexity.  Labs and studies personally reviewed.      Sapphire Harris MD    Primary Care Physician   Nadira Rob    Reason for Consult   Chest pain      History of Present Illness     This is a 79 year old female with a history of recent acute heart failure with moderately reduced ejection fraction secondary to recently diagnosed stress-induced cardiomyopathy, hypertension, hyperlipidemia, paroxysmal atrial fibrillation, CVA, asthma, sarcoidosis, and GERD who presents with chest pain and rising Cr. Other labs notable for TSH elevation, TN to 24, Na 127, Cl 95, and Cr 0.99. NTproBNP 885 (down from 4700). ECG with inferolateral TW changes. Currently CP free. Cr improved.       Past Medical History   Past Medical  History:   Diagnosis Date    Asthma     GERD (gastroesophageal reflux disease)     Heart failure with reduced ejection fraction (H)     Paroxysmal atrial fibrillation (H)     Stress-induced cardiomyopathy        Past Surgical History   Past Surgical History:   Procedure Laterality Date    CV CORONARY ANGIOGRAM N/A 3/7/2025    Procedure: Coronary Angiogram;  Surgeon: Darryl Chris MD;  Location:  [facility]ARD[facility] CATH LAB    CV LEFT HEART CATH N/A 3/7/2025    Procedure: Left Heart Catheterization;  Surgeon: Darryl Chris MD;  Location:  [facility]ARD[facility] CATH LAB    CV LEFT VENTRICULOGRAM N/A 3/7/2025    Procedure: Left Ventriculogram;  Surgeon: Darryl Chris MD;  Location:  HEART CARD[facility] CATH LAB         Prior to Admission Medications   Prior to Admission Medications   Prescriptions Last Dose Informant Patient Reported? Taking?   TRELEGY ELLIPTA 100-62.5-25 MCG/ACT oral inhaler 3/27/2025 Morning  Yes Yes   Sig: Inhale 2 puffs into the lungs daily   albuterol (PROAIR HFA/PROVENTIL HFA/VENTOLIN HFA) 108 (90 Base) MCG/ACT inhaler 3/26/2025  Yes Yes   Sig: Inhale 2 puffs into the lungs at bedtime. And Q6H as needed   apixaban ANTICOAGULANT (ELIQUIS) 2.5 MG tablet 3/27/2025 Morning  Yes Yes   Sig: Take 2.5 mg by mouth 2 times daily.   atorvastatin (LIPITOR) 10 MG tablet 3/27/2025 Morning  No Yes   Sig: Take 1 tablet (10 mg) by mouth daily.   budesonide (PULMICORT) 0.5 MG/2ML neb solution Unknown  Yes Yes   Sig: Take 0.5 mg by nebulization daily as needed (short of breath, wheezing).   calcium carbonate (OS-SHINE) 1500 (600 Ca) MG tablet 3/27/2025 Morning  Yes Yes   Sig: Take 600 mg by mouth daily.   cetirizine (ZYRTEC) 10 MG tablet 3/26/2025 Evening  Yes Yes   Sig: Take 10 mg by mouth every evening.   conjugated estrogens (PREMARIN) 0.625 MG/GM vaginal cream   Yes Yes   Sig: Place vaginally daily as needed.   docusate sodium (DSS) 100 MG capsule 3/26/2025 Evening  Yes Yes   Sig: Take 1 capsule by  mouth every evening.   fish oil-omega-3 fatty acids 1000 MG capsule 3/27/2025 Morning  Yes Yes   Sig: Take 1 g by mouth 2 times daily.   fluticasone (FLONASE) 50 MCG/ACT nasal spray 3/27/2025 Morning  Yes Yes   Sig: Spray 1 spray into both nostrils 2 times daily.   furosemide (LASIX) 20 MG tablet 3/27/2025 Morning  No Yes   Sig: Take 1 tablet (20 mg) by mouth every morning.   ipratropium (ATROVENT) 0.02 % neb solution Unknown  Yes Yes   Sig: Inhale 0.5 mg into the lungs every 6 hours as needed for wheezing.   ketoconazole (NIZORAL) 2 % external cream Unknown  Yes Yes   Sig: Apply topically daily as needed for itching or irritation. To feet   meclizine (ANTIVERT) 12.5 MG tablet Unknown  Yes Yes   Sig: Take 12.5 mg by mouth 3 times daily as needed for dizziness.   metoprolol succinate ER (TOPROL XL) 25 MG 24 hr tablet 3/27/2025 Morning  No Yes   Sig: Take 1 tablet (25 mg) by mouth daily.   omeprazole (PRILOSEC) 20 MG DR capsule Unknown  Yes Yes   Sig: Take 20 mg by mouth daily as needed.   polyethylene glycol (MIRALAX) 17 g packet Unknown  Yes Yes   Sig: Take 1 packet by mouth daily as needed for constipation.   polyethylene glycol-propylene glycol (SYSTANE ULTRA) 0.4-0.3 % SOLN ophthalmic solution Unknown  Yes Yes   Sig: Place 1-2 drops into both eyes 4 times daily as needed for dry eyes.   sodium chloride (OCEAN) 0.65 % nasal spray 3/27/2025 Morning  Yes Yes   Sig: Spray 1 spray into both nostrils 2 times daily.   valsartan (DIOVAN) 40 MG tablet 3/26/2025  No Yes   Sig: Take 1 tablet (40 mg) by mouth every evening.      Facility-Administered Medications: None     Current Facility-Administered Medications   Medication Dose Route Frequency Provider Last Rate Last Admin    acetaminophen (TYLENOL) tablet 650 mg  650 mg Oral Q4H PRN Jose Peña MD   650 mg at 03/28/25 0158    Or    acetaminophen (TYLENOL) Suppository 650 mg  650 mg Rectal Q4H PRN Jose Peña MD        albuterol (PROVENTIL  HFA/VENTOLIN HFA) inhaler  2 puff Inhalation At Bedtime Jose Peña MD   2 puff at 03/28/25 0031    alum & mag hydroxide-simethicone (MAALOX) suspension 30 mL  30 mL Oral Q4H PRN Jose Peña MD        apixaban ANTICOAGULANT (ELIQUIS) tablet 2.5 mg  2.5 mg Oral BID Jose Peña MD   2.5 mg at 03/28/25 0932    [Held by provider] atorvastatin (LIPITOR) tablet 10 mg  10 mg Oral Daily Jose Peña MD        cetirizine (zyrTEC) tablet 10 mg  10 mg Oral QPM Jose Peña MD   10 mg at 03/27/25 2252    docusate sodium (COLACE) capsule 100 mg  100 mg Oral QPM Jose Peña MD   100 mg at 03/27/25 2252    fluticasone-vilanterol (BREO ELLIPTA) 100-25 MCG/ACT inhaler 1 puff  1 puff Inhalation Daily Jose Peña MD        And    umeclidinium (INCRUSE ELLIPTA) 62.5 MCG/ACT inhaler 1 puff  1 puff Inhalation Daily Jose Peña MD        [Held by provider] furosemide (LASIX) tablet 20 mg  20 mg Oral QAM Jose Peña MD        lidocaine (LMX4) cream   Topical Q1H PRN Jose Peña MD        lidocaine 1 % 0.1-1 mL  0.1-1 mL Other Q1H PRN Jose Peña MD        metoprolol succinate ER (TOPROL XL) 24 hr tablet 25 mg  25 mg Oral Daily Jose Peña MD   25 mg at 03/28/25 0932    nitroGLYcerin (NITROSTAT) sublingual tablet 0.4 mg  0.4 mg Sublingual Q5 Min PRN Jose Peña MD        sodium chloride (PF) 0.9% PF flush 3 mL  3 mL Intracatheter Q8H VANESSA Jose Peña MD   3 mL at 03/27/25 2304    sodium chloride (PF) 0.9% PF flush 3 mL  3 mL Intracatheter q1 min prn Jose Peña MD   3 mL at 03/28/25 0934    [Held by provider] valsartan (DIOVAN) tablet 40 mg  40 mg Oral QPM Jose Peña MD         Current Facility-Administered Medications   Medication Dose Route Frequency Provider Last Rate Last Admin    acetaminophen (TYLENOL) tablet 650 mg  650 mg Oral Q4H  PRN Jose Peña MD   650 mg at 03/28/25 0158    Or    acetaminophen (TYLENOL) Suppository 650 mg  650 mg Rectal Q4H PRN Jose Peña MD        albuterol (PROVENTIL HFA/VENTOLIN HFA) inhaler  2 puff Inhalation At Bedtime Jose Peña MD   2 puff at 03/28/25 0031    alum & mag hydroxide-simethicone (MAALOX) suspension 30 mL  30 mL Oral Q4H PRN Jose Peña MD        apixaban ANTICOAGULANT (ELIQUIS) tablet 2.5 mg  2.5 mg Oral BID Jose Peña MD   2.5 mg at 03/28/25 0932    [Held by provider] atorvastatin (LIPITOR) tablet 10 mg  10 mg Oral Daily Jose Peña MD        cetirizine (zyrTEC) tablet 10 mg  10 mg Oral QPM Jose Peña MD   10 mg at 03/27/25 2252    docusate sodium (COLACE) capsule 100 mg  100 mg Oral QPM Jose Peña MD   100 mg at 03/27/25 2252    fluticasone-vilanterol (BREO ELLIPTA) 100-25 MCG/ACT inhaler 1 puff  1 puff Inhalation Daily Jose Peña MD        And    umeclidinium (INCRUSE ELLIPTA) 62.5 MCG/ACT inhaler 1 puff  1 puff Inhalation Daily Jose Peña MD        [Held by provider] furosemide (LASIX) tablet 20 mg  20 mg Oral QAM Jose Peña MD        lidocaine (LMX4) cream   Topical Q1H PRN Jose Peña MD        lidocaine 1 % 0.1-1 mL  0.1-1 mL Other Q1H PRN Jose Peña MD        metoprolol succinate ER (TOPROL XL) 24 hr tablet 25 mg  25 mg Oral Daily Jose Peña MD   25 mg at 03/28/25 0932    nitroGLYcerin (NITROSTAT) sublingual tablet 0.4 mg  0.4 mg Sublingual Q5 Min PRN Jose Peña MD        sodium chloride (PF) 0.9% PF flush 3 mL  3 mL Intracatheter Q8H VANESSA EklofJose MD   3 mL at 03/27/25 2304    sodium chloride (PF) 0.9% PF flush 3 mL  3 mL Intracatheter q1 min prn EklofJose MD   3 mL at 03/28/25 0934    [Held by provider] valsartan (DIOVAN) tablet 40 mg  40 mg Oral QPM Eklof,  "Jose Juárez MD         Allergies   Allergies   Allergen Reactions    Sulfa Antibiotics Unknown     Long time ago, she cannot remember    Diazepam Other (See Comments)     Makes more anxious    Meperidine Nausea and Vomiting and Unknown    Morphine Nausea and Unknown    Penicillins Itching    Zafirlukast      She does not remember       Social History    reports that she has never smoked. She has never used smokeless tobacco.    Family History   I have reviewed this patient's family history and updated it with pertinent information if needed.  Family History   Problem Relation Age of Onset    Heart Defect Mother     Myocardial Infarction Father           Review of Systems   A comprehensive review of system was performed and is negative other than that noted in the HPI or here.     Physical Exam   Vital Signs with Ranges  Temp:  [97  F (36.1  C)-98.1  F (36.7  C)] 98.1  F (36.7  C)  Pulse:  [64-75] 75  Resp:  [14-28] 16  BP: (113-148)/(57-83) 124/57  SpO2:  [95 %-99 %] 98 %  Wt Readings from Last 4 Encounters:   03/27/25 59.4 kg (131 lb)   03/13/25 58.7 kg (129 lb 8 oz)   03/07/25 61.6 kg (135 lb 11.2 oz)   12/30/24 59 kg (130 lb)     I/O last 3 completed shifts:  In: -   Out: 400 [Urine:400]      Vitals: /57 (BP Location: Right arm)   Pulse 75   Temp 98.1  F (36.7  C) (Oral)   Resp 16   Wt 59.4 kg (131 lb)   LMP  (LMP Unknown)   SpO2 98%   BMI 23.21 kg/m      Physical Exam:   General - Alert and oriented to time place and person in no acute distress  Eyes - No scleral icterus  HEENT - Neck supple, moist mucous membranes  Cardiovascular - RRR no mrg  Extremities - There is 1+ edema  Respiratory - CTAB  Skin - No pallor or cyanosis  Gastrointestinal - Non tender. +distension.  Psych - Appropriate affect   Neurological - No gross motor neurological focal deficits    No lab results found in last 7 days.    Invalid input(s): \"TROPONINIES\"    Recent Labs   Lab 03/28/25  0830 03/27/25  1459   WBC  --  6.8 " "  HGB  --  12.2   MCV  --  84   PLT  --  322   * 129*   POTASSIUM 4.2 4.5   CHLORIDE 95* 94*   CO2 23 24   BUN 18.6 25.5*   CR 0.63 0.99*   GFRESTIMATED 89 57*   ANIONGAP 9 11   SHINE 8.8 9.4   * 79   ALBUMIN  --  4.1   PROTTOTAL  --  6.6   BILITOTAL  --  0.3   ALKPHOS  --  78   ALT  --  18   AST  --  28   LIPASE  --  33     Recent Labs   Lab Test 03/06/25  1043 03/06/25  0618   CHOL 175 158   HDL 78 72   LDL 88 80   TRIG 47 30     Recent Labs   Lab 03/27/25  1459   WBC 6.8   HGB 12.2   HCT 35.5   MCV 84        No results for input(s): \"PH\", \"PHV\", \"PO2\", \"PO2V\", \"SAT\", \"PCO2\", \"PCO2V\", \"HCO3\", \"HCO3V\" in the last 168 hours.  Recent Labs   Lab 03/27/25  1459   NTBNPI 885     No results for input(s): \"DD\" in the last 168 hours.  No results for input(s): \"SED\", \"CRP\" in the last 168 hours.  Recent Labs   Lab 03/27/25  1459        Recent Labs   Lab 03/28/25  0830   TSH 4.55*     No results for input(s): \"COLOR\", \"APPEARANCE\", \"URINEGLC\", \"URINEBILI\", \"URINEKETONE\", \"SG\", \"UBLD\", \"URINEPH\", \"PROTEIN\", \"UROBILINOGEN\", \"NITRITE\", \"LEUKEST\", \"RBCU\", \"WBCU\" in the last 168 hours.    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 Mild to moderate perihilar reticular opacities are seen which may suggest atypical pneumonia. Consider further characterization with CT chest exam. Known pulmonary nodules are better seen on the   prior CT chest exam from 3/6/2025. 9 mm nodular density in the left lower lung was not definitively seen on the prior exam. This nodule can be further characterized on the CT exam suggested above. Multiple calcified granulomas are noted in the   mediastinum. Normal cardiomediastinal silhouette. Multilevel degenerative changes are " seen in the spine with scoliotic curvature.   Head CT w/o contrast    Narrative    EXAM: CT HEAD W/O CONTRAST  LOCATION: [facility]  DATE/TIME: 3/27/2025 3:42 PM CDT    INDICATION: Headache and nausea  COMPARISON: CT head dated 3/5/2025  TECHNIQUE: Routine CT Head without IV contrast. Multiplanar reformats. Dose reduction techniques were used.    FINDINGS:   INTRACRANIAL CONTENTS: No acute intracranial hemorrhage. No CT evidence of acute infarct. Sequelae of mild chronic microangiopathy. Mild cerebral volume loss without hydrocephalus. No extra-axial fluid collections.  Patent basal cisterns.     VISUALIZED ORBITS/SINUSES/MASTOIDS: Postoperative changes of bilateral lenses, otherwise the orbits are unremarkable. The visualized paranasal sinuses and temporal bone structures are well-aerated.     BONES/SOFT TISSUES: Probable right frontal bone hemangioma, otherwise the calvarium and skull base are unremarkable.       Impression    IMPRESSION:     1. Senescent changes and sequelae of chronic microangiopathy without acute intracranial abnormality.       Echo:  No results found for this or any previous visit (from the past 4320 hours).    Clinically Significant Risk Factors Present on Admission         # Hyponatremia: Lowest Na = 127 mmol/L in last 2 days, will monitor as appropriate  # Hypochloremia: Lowest Cl = 94 mmol/L in last 2 days, will monitor as appropriate       # Drug Induced Coagulation Defect: home medication list includes an anticoagulant medication    # Hypertension: Home medication list includes antihypertensive(s)  # Chronic heart failure with reduced ejection fraction: last echo with EF <40%              # Financial/Environmental Concerns:    # Asthma: noted on problem list       Cardiomyopathy

## 2025-03-28 NOTE — PLAN OF CARE
PRIMARY Concern: admitted for chest pain. Found to have acute kidney injury, hyponatremic   SAFETY RISK Concerns (fall risk, behaviors, etc.): Fall risk      Aggression Tool Color: Green  Isolation/Type: N/A  Tests/Procedures for NEXT shift: abdominal ultrasound   Consults? (Pending/following, signed-off?) cardiology signed off   Where is patient from? (Home, TCU, etc.): Home with spouse.  Other Important info for NEXT shift: dizziness upon standing   Anticipated DC date & active delays: TBD. Pending clinical improvement     SUMMARY NOTE: 03/28/25: [id number]  Orientation/Cognitive: AXO4  Observation Goals (Met/ Not Met): OBS  Mobility Level/Assist Equipment: SBA with IV pole/ refused walker. Up in the chair this shift.    Antibiotics & Plan (IV/po, length of tx left): N/A  Pain Management: denies this shift.  Complete Pain Reassessment: Y/N  Due next: shift   Tele/VS/O2: Tele SR. No chest pain. VSS on RA  ABNL Lab/BG: See chart   Diet: NPO until 2100 this evening for abdominal ultrasound. After that regular diet. On 2L Fluid restriction  Bowel/Bladder: Cont B/B. Voiding   Skin Concerns: dry skin   Drains/Devices: PIV SL.   Patient Stated Goal for Today: rest

## 2025-03-29 ENCOUNTER — APPOINTMENT (OUTPATIENT)
Dept: PHYSICAL THERAPY | Facility: CLINIC | Age: 80
DRG: 291 | End: 2025-03-29
Attending: HOSPITALIST
Payer: COMMERCIAL

## 2025-03-29 PROBLEM — R51.9 HEADACHE: Status: ACTIVE | Noted: 2025-03-29

## 2025-03-29 LAB
ANION GAP SERPL CALCULATED.3IONS-SCNC: 9 MMOL/L (ref 7–15)
ATRIAL RATE - MUSE: 68 BPM
BUN SERPL-MCNC: 15.6 MG/DL (ref 8–23)
CALCIUM SERPL-MCNC: 8.7 MG/DL (ref 8.8–10.4)
CHLORIDE SERPL-SCNC: 100 MMOL/L (ref 98–107)
CREAT SERPL-MCNC: 0.59 MG/DL (ref 0.51–0.95)
DIASTOLIC BLOOD PRESSURE - MUSE: NORMAL MMHG
EGFRCR SERPLBLD CKD-EPI 2021: >90 ML/MIN/1.73M2
GLUCOSE BLDC GLUCOMTR-MCNC: 102 MG/DL (ref 70–99)
GLUCOSE SERPL-MCNC: 93 MG/DL (ref 70–99)
HCO3 SERPL-SCNC: 22 MMOL/L (ref 22–29)
INTERPRETATION ECG - MUSE: NORMAL
P AXIS - MUSE: 66 DEGREES
POTASSIUM SERPL-SCNC: 4.3 MMOL/L (ref 3.4–5.3)
PR INTERVAL - MUSE: 194 MS
QRS DURATION - MUSE: 106 MS
QT - MUSE: 444 MS
QTC - MUSE: 472 MS
R AXIS - MUSE: 28 DEGREES
SODIUM SERPL-SCNC: 131 MMOL/L (ref 135–145)
SYSTOLIC BLOOD PRESSURE - MUSE: NORMAL MMHG
T AXIS - MUSE: 115 DEGREES
TROPONIN T SERPL HS-MCNC: 24 NG/L
TROPONIN T SERPL HS-MCNC: 26 NG/L
VENTRICULAR RATE- MUSE: 68 BPM

## 2025-03-29 PROCEDURE — 84484 ASSAY OF TROPONIN QUANT: CPT

## 2025-03-29 PROCEDURE — G0378 HOSPITAL OBSERVATION PER HR: HCPCS

## 2025-03-29 PROCEDURE — 36415 COLL VENOUS BLD VENIPUNCTURE: CPT

## 2025-03-29 PROCEDURE — 99221 1ST HOSP IP/OBS SF/LOW 40: CPT | Performed by: INTERNAL MEDICINE

## 2025-03-29 PROCEDURE — 80048 BASIC METABOLIC PNL TOTAL CA: CPT | Performed by: HOSPITALIST

## 2025-03-29 PROCEDURE — 250N000013 HC RX MED GY IP 250 OP 250 PS 637: Performed by: HOSPITALIST

## 2025-03-29 PROCEDURE — 99232 SBSQ HOSP IP/OBS MODERATE 35: CPT | Performed by: HOSPITALIST

## 2025-03-29 PROCEDURE — 93005 ELECTROCARDIOGRAM TRACING: CPT

## 2025-03-29 PROCEDURE — 97161 PT EVAL LOW COMPLEX 20 MIN: CPT | Mod: GP

## 2025-03-29 PROCEDURE — 97530 THERAPEUTIC ACTIVITIES: CPT | Mod: GP

## 2025-03-29 PROCEDURE — 120N000001 HC R&B MED SURG/OB

## 2025-03-29 PROCEDURE — 97112 NEUROMUSCULAR REEDUCATION: CPT | Mod: GP

## 2025-03-29 PROCEDURE — 93010 ELECTROCARDIOGRAM REPORT: CPT | Performed by: INTERNAL MEDICINE

## 2025-03-29 PROCEDURE — 82962 GLUCOSE BLOOD TEST: CPT

## 2025-03-29 PROCEDURE — 250N000013 HC RX MED GY IP 250 OP 250 PS 637: Performed by: INTERNAL MEDICINE

## 2025-03-29 PROCEDURE — 99291 CRITICAL CARE FIRST HOUR: CPT

## 2025-03-29 RX ORDER — TORSEMIDE 10 MG/1
10 TABLET ORAL DAILY
Status: DISCONTINUED | OUTPATIENT
Start: 2025-03-30 | End: 2025-03-30 | Stop reason: HOSPADM

## 2025-03-29 RX ADMIN — APIXABAN 2.5 MG: 2.5 TABLET, FILM COATED ORAL at 10:51

## 2025-03-29 RX ADMIN — METOPROLOL SUCCINATE 25 MG: 25 TABLET, EXTENDED RELEASE ORAL at 10:51

## 2025-03-29 RX ADMIN — VALSARTAN 40 MG: 40 TABLET, FILM COATED ORAL at 20:23

## 2025-03-29 RX ADMIN — NITROGLYCERIN 0.4 MG: 0.4 TABLET SUBLINGUAL at 09:13

## 2025-03-29 RX ADMIN — CETIRIZINE HYDROCHLORIDE 10 MG: 10 TABLET, FILM COATED ORAL at 20:23

## 2025-03-29 RX ADMIN — DOCUSATE SODIUM 100 MG: 100 CAPSULE, LIQUID FILLED ORAL at 20:23

## 2025-03-29 RX ADMIN — ALBUTEROL SULFATE 2 PUFF: 108 INHALANT RESPIRATORY (INHALATION) at 20:30

## 2025-03-29 RX ADMIN — APIXABAN 2.5 MG: 2.5 TABLET, FILM COATED ORAL at 20:23

## 2025-03-29 ASSESSMENT — ACTIVITIES OF DAILY LIVING (ADL)
ADLS_ACUITY_SCORE: 49
ADLS_ACUITY_SCORE: 47
ADLS_ACUITY_SCORE: 49
ADLS_ACUITY_SCORE: 47
ADLS_ACUITY_SCORE: 47
ADLS_ACUITY_SCORE: 49
ADLS_ACUITY_SCORE: 49
ADLS_ACUITY_SCORE: 47
ADLS_ACUITY_SCORE: 47
ADLS_ACUITY_SCORE: 49
ADLS_ACUITY_SCORE: 49
ADLS_ACUITY_SCORE: 47
ADLS_ACUITY_SCORE: 49
ADLS_ACUITY_SCORE: 49
ADLS_ACUITY_SCORE: 47
ADLS_ACUITY_SCORE: 49
ADLS_ACUITY_SCORE: 47
ADLS_ACUITY_SCORE: 49
ADLS_ACUITY_SCORE: 47

## 2025-03-29 NOTE — PROGRESS NOTES
Observation goals  PRIOR TO DISCHARGE        Comments: List all goals to be met before discharge home: not met  - Serial troponins and stress test complete- Met  - Seen and cleared by consultant if applicable- Met  - Adequate pain control on oral analgesia- Met  - Vital signs normal or at patient baseline- Partially met, orthostatic BP previously positive  - Safe disposition plan has been identified- Not met, PT pending  - Nurse to notify provider when observation goals have been met and patient is ready for discharge.

## 2025-03-29 NOTE — CODE/RAPID RESPONSE
[facility]    RRT Note  3/29/2025   Time Called: 9:08 AM    Code Status: Full Code    I was called to evaluate Margareth Hogue, who is a 79 year old female with a PMH significant for newly diagnosed HFrEF 2/2 stress induced cardiomyopathy, HTN, HLD, PAF, CVA, asthma, sarcoidosis, and GERD, who was admitted on 3/27/2025 for chest pain evaluation.    Assessment & Plan     Chest pain suspect 2/2 ACS  Prior to RRT patient was c/o R sided chest pain radiating into her shoulder while working with PT. Upon arrival patient is lying supine in bed, she is alert and oriented.  She is not in acute respiratory distress.  She is reporting 2 out of 10 midsternal chest pain that is nonradiating and developed at rest.  She denies any shortness of breath, palpitations, diaphoresis, nausea, lightheadedness, or dizziness.  Patient reports this is the same chest pain that she experienced when arriving to the hospital.    She was given 1 sublingual nitroglycerin without any improvement in chest pain.  BP decreased to 120s/70s after nitroglycerin.  Given patient did not have any response with nitroglycerin and BP decreased again to 113/51, no additional doses sublingual nitroglycerin were given.      INTERVENTIONS:  - 12-lead EKG shows SR with new T-wave inversions in V3-V6.   - Troponin 26, repeat in 2 hours    Discussed with and defer further cares to       Nik Saavedra NP  [facility]  Securely message with the Vocera Web Console (learn more here)  Text page via [employer] Paging/Directory        Physical Exam   Vital Signs with Ranges:  Temp:  [97.7  F (36.5  C)-98.4  F (36.9  C)] 98.3  F (36.8  C)  Pulse:  [62-71] 69  Resp:  [16-18] 18  BP: (109-140)/(50-86) 140/86  SpO2:  [96 %-98 %] 98 %  I/O last 3 completed shifts:  In: 740 [P.O.:740]  Out: 1700 [Urine:1700]    Orthostatic:  Lying Orthostatic BP: 132/66   Sitting Orthostatic BP: 146/80   Standing Orthostatic BP: 153/78     Physical  Exam   EXAM:   General:  Appears stated age, no acute distress. A&O x 3.  Skin:  Warm, dry. No rashes or lesions on exposed skin.  HEENT:  Normocephalic, atraumatic.  Neck:  Supple.  Chest:  Breath sounds CTA and no increased work of breathing on room air.  Cardiovascular:  RRR, no rub or murmur. No peripheral edema.  Abdomen:  Soft, non-tender, non-distended.  Musculoskeletal:  Moves all four extremities. No muscle atrophy.  Neurological:  No focal neurological deficits.   Psychiatric:  Affect and mood congruent.    Data     EKG:  Interpreted by Nik Saavedra NP  Time reviewed: 9:26 AM  Symptoms at time of EKG: chest pain   Rhythm: normal sinus   Rate: 69  Axis: Normal  Ectopy: none  Conduction: normal  ST Segments/ T Waves: T wave inversion V3, V4, V5, and V6  Q Waves: septal  Comparison to prior: new septal infarct, new t wave inversions    Clinical Impression: SR with new ischemic changes    IMAGING: (X-ray/CT/MRI)   Recent Results (from the past 24 hours)   Echocardiogram Limited   Result Value    LVEF  50-55%    Narrative    [id number]  [id number]  [id number]  [id number]^KAMAR^FAUZIA^WILTON     [facility]  Echocardiography Laboratory  [address] [place] [address]     Name: SAMEER KIRKPATRICK  MRN: [id number]  : 1945  Study Date: 2025 11:09 AM  Age: 79 yrs  Gender: Female  Patient Location: [facility]  Reason For Study: CHF  Ordering Physician: FAUZIA GALVIN  Performed By: COREEN Hernandez     BSA: 1.6 m2  Height: 63 in  Weight: 131 lb  HR: 53  BP: 125/75 mmHg  ______________________________________________________________________________  Procedure  Definity (NDC #97364-948) given intravenously. Limited Echocardiogram with  two-dimensional, color and spectral Doppler. Contrast Definity. Technically  difficult study.  ______________________________________________________________________________  Interpretation Summary     The visual ejection fraction is  50-55%.  There is mild apical wall hypokinesis.  No significant valvular heart disease.  ______________________________________________________________________________  Left Ventricle  The left ventricle is normal in size. There is normal left ventricular wall  thickness. The visual ejection fraction is 50-55%. There is mild apical wall  hypokinesis.     Right Ventricle  The right ventricle is normal in size and function.     Atria  Normal left atrial size. Right atrial size is normal.     Mitral Valve  The mitral valve leaflets are mildly thickened. There is trace to mild mitral  regurgitation.     Tricuspid Valve  There is trace to mild tricuspid regurgitation. IVC diameter and respiratory  changes fall into an intermediate range suggesting an RA pressure of 8 mmHg.  The right ventricular systolic pressure is approximated at 17.5 mmHg plus the  right atrial pressure.     Aortic Valve  There is trivial trileaflet aortic sclerosis. No aortic regurgitation is  present.     Pulmonic Valve  There is trace pulmonic valvular regurgitation.     Pericardium  There is no pericardial effusion.     Rhythm  Sinus rhythm was noted.     ______________________________________________________________________________  MMode/2D Measurements & Calculations  IVSd: 0.70 cm  LVIDd: 5.3 cm  LVIDs: 3.2 cm  LVPWd: 0.70 cm  FS: 39.6 %     LV mass(C)d: 127.0 grams  LV mass(C)dI: 78.6 grams/m2  EF Biplane: 54.7 %  RWT: 0.26     Doppler Measurements & Calculations  TR max nadia: 209.0 cm/sec  TR max P.5 mmHg     ______________________________________________________________________________  Report approved by: Chencho George MD on 2025 12:47 PM         US Abdomen Limited    Narrative    EXAM: US ABDOMEN LIMITED  LOCATION: [facility]  DATE: 3/28/2025    INDICATION: Abdominal swelling  COMPARISON: None.  TECHNIQUE: Limited abdominal ultrasound.    FINDINGS:    GALLBLADDER: Normal. No gallstones, wall thickening,  "or pericholecystic fluid. Negative sonographic Morales's sign.    BILE DUCTS: No biliary dilatation. The common duct measures 7 mm.    LIVER: Normal parenchyma with smooth contour. No focal mass. The portal vein is patent with flow in the normal direction.    RIGHT KIDNEY: No hydronephrosis.    PANCREAS: The visualized portions are normal.    No ascites.      Impression    IMPRESSION:  1.  Normal limited abdominal ultrasound.           CBC with Diff:  Recent Labs   Lab Test 03/27/25  1459 12/30/24  1427 09/13/24  1517   WBC 6.8   < >  --    HGB 12.2   < >  --    MCV 84   < >  --       < >  --    INR  --   --  1.0    < > = values in this interval not displayed.      No results found for: \"RETICABSCT\"  No results found for: \"RETP\"    Comprehensive Metabolic Panel:  Recent Labs   Lab 03/29/25  0911 03/28/25  1641 03/28/25  1203 03/28/25  0830 03/27/25  1459   NA  --  127*  --  127* 129*   POTASSIUM  --  4.4  --  4.2 4.5   CHLORIDE  --  97*  --  95* 94*   CO2  --  23  --  23 24   ANIONGAP  --  7  --  9 11   * 97   < > 110* 79   BUN  --  18.5  --  18.6 25.5*   CR  --  0.60  --  0.63 0.99*   GFRESTIMATED  --  90  --  89 57*   SHINE  --  8.8  --  8.8 9.4   MAG  --   --   --  2.1  --    PROTTOTAL  --   --   --   --  6.6   ALBUMIN  --   --   --   --  4.1   BILITOTAL  --   --   --   --  0.3   ALKPHOS  --   --   --   --  78   AST  --   --   --   --  28   ALT  --   --   --   --  18    < > = values in this interval not displayed.         Time Spent on this Encounter     CRITICAL CARE TIME  I spent 30 minutes of critical care time on the unit/floor managing the care of Margareth Hogue. Upon evaluation, this patient had a high probability of imminent or life-threatening deterioration due to chest pain which required my direct attention, intervention, and personal management. 100% of my time was spent at the bedside counseling the patient and/or coordinating care regarding services listed in this note.    "

## 2025-03-29 NOTE — PROGRESS NOTES
PT reported pt c/o R sided chest pain radiating to R shoulder during therapy. Upon writer's assessment, pt now reports anterior medial chest pain radiating to mid back associated with minimal c/o shortness of breath. VSS. EKG ordered. RRT called.

## 2025-03-29 NOTE — PROGRESS NOTES
03/29/25 0820   Appointment Info   Signing Clinician's Name / Credentials (PT) Leta Bee, PT, DPT   Rehab Comments (PT) (S)  Patient with R shoulder and back discomfort in bed but denies pain at start of session. Patient with no complaints of pain during activity but at end of session reporting mild chest pain. Then tells therapist that pain has been present all morning but she had not told anyone about it. Notified BUCKY Choi immediately who called an RRT.   Quick Adds   Quick Adds Certification;Vestibular Eval   Living Environment   People in Home spouse   Current Living Arrangements apartment   Home Accessibility no concerns   Transportation Anticipated car, drives self;family or friend will provide   Living Environment Comments Patient lives at [address] [place] [address] with her . Has elevator access to her apartment.   Self-Care   Usual Activity Tolerance good   Current Activity Tolerance moderate   Regular Exercise Yes   Equipment Currently Used at Home none   Fall history within last six months yes   Number of times patient has fallen within last six months 1   Activity/Exercise/Self-Care Comment Patient is active and independent with mobility at baseline. She has been going to cardiac rehab. She usually takes the stairs for exercise.   General Information   Onset of Illness/Injury or Date of Surgery 03/27/25   Referring Physician Ascencion Godfrey MD   Patient/Family Therapy Goals Statement (PT) to go home   Pertinent History of Current Problem (include personal factors and/or comorbidities that impact the POC) Patient is 80 YO F who presented to hospital with chest pain. PMH includes heart failure with moderately reduced ejection fraction, recently diagnosed stress-induced cardiomyopathy, hypertension, hyperlipidemia, paroxysmal atrial fibrillation, CVA, asthma, sarcoidosis, and GERD.   Existing Precautions/Restrictions no known precautions/restrictions   General Observations Patient cleared to be up  independently by nursing team this morning. Patient sitting up in bed, agreeable to PT, hoping to go home today.   Cognition   Affect/Mental Status (Cognition) WFL   Orientation Status (Cognition) oriented x 4   Follows Commands (Cognition) WFL   Pain Assessment   Patient Currently in Pain No   Integumentary/Edema   Integumentary/Edema no deficits were identifed   Posture    Posture Forward head position   Range of Motion (ROM)   Range of Motion ROM is WFL   Strength (Manual Muscle Testing)   Strength (Manual Muscle Testing) strength is WFL   Bed Mobility   Bed Mobility no deficits identified   Comment, (Bed Mobility) Independent supine <> sit.   Transfers   Transfers no deficits identified   Comment, (Transfers) Independent sit <> stand, no assistive device and no dizziness, BP stable, see VS flowsheet for orthostatic vitals   Gait/Stairs (Locomotion)   [place] Level (Gait) independent   Distance in Feet (Gait) 25' during eval   Balance   Balance Comments Independent sitting balance, good standing balance, no losses of balance ambulating even with dizziness with turning   Sensory Examination   Sensory Perception WNL   Coordination   Coordination no deficits were identified   Cervicogenic Screen   Neck ROM WNL and pain free   Oculomotor Exam   Ocular ROM Normal   Smooth Pursuit Normal   Saccades Normal   VOR Abnormal   VOR Comments Dizziness, mild intermittent slips   Convergence Testing Normal   Infrared Goggle Exam or Frenzel Lense Exam   Exam completed with Room Light   Spontaneous Nystagmus Negative   Gaze Evoked Nystagmus Negative   Positional testing Negative   Positional Testing Comments No dizziness or vertigo bed supine <> sit or rolling either direction   Clinical Impression   Criteria for Skilled Therapeutic Intervention Yes, treatment indicated   PT Diagnosis (PT) Dizziness   Influenced by the following impairments Impaired VOR   Functional limitations due to impairments Difficulty turning head  during functional mobility   Clinical Presentation (PT Evaluation Complexity) stable   Clinical Presentation Rationale Medical status, stable vitals, clinical judgement   Clinical Decision Making (Complexity) low complexity   Planned Therapy Interventions (PT) home exercise program;patient/family education;progressive activity/exercise;home program guidelines   Risk & Benefits of therapy have been explained evaluation/treatment results reviewed;care plan/treatment goals reviewed;risks/benefits reviewed;current/potential barriers reviewed;participants voiced agreement with care plan;participants included;patient   Clinical Impression Comments Patient with head injury earlier last month - has been having dizziness with head movement that was never previously assessed. Mild VOR impariment noted.   PT Total Evaluation Time   PT Eval, Low Complexity Minutes (18202) 10   Therapy Certification   Start of care date 03/29/25   Certification date from 03/29/25   Certification date to 03/29/25   Medical Diagnosis Nonspecific abnormal electrocardiogram (ECG) (EKG)   Physical Therapy Goals   PT Frequency One time eval and treatment only   PT Predicted Duration/Target Date for Goal Attainment 03/29/25   PT Goals PT Goal 1;Gait   PT: Gait Independent;50 feet;Goal Met   PT: Goal 1 Patient will demonstrate independently with VORx1 exercises to transition to self management of exercise.   Interventions   Interventions Quick Adds Therapeutic Activity;Neuromuscular Re-ed   Therapeutic Activity   Therapeutic Activities: dynamic activities to improve functional performance Minutes (38274) 10   Symptoms Noted During/After Treatment Dizziness   Treatment Detail/Skilled Intervention Patient sitting upright in bed, independently completed supine to sit. No dizziness. Patient did state she does get dizzy at times when she gets up. Education on slow transitions and assessing for symptoms before progressing mobility. No dizziness with sit to  "stand. Patient ambulated ~80' in room with no assistive device, independently with no losses of balance but dizziness with pivot turning noted. Education on turning slowly to limit dizziness symptoms. Patient independently returned to supine at end of session. Vital signs stable throughout session. Notified RN Steph immediately at end of session after patient reporting mild central chest pain she has had all morning, confirms unchanged by activity.   Neuromuscular Re-education   Neuromuscular Re-Education Minutes (03527) 14   Symptoms Noted During/After Treatment dizziness   Treatment Detail/Skilled Intervention Education on VOR and exercises for re-training. Demonstration provided on VOR x1 exercises. Patient performed exercise sitting up in bed with good return demo of technique with use of \"x\" on a notecard as a target. Education on how to progress exercises with speed and ROM. Patient stating she does nott think she needs instructions, \"It's not that hard.\" Handout for HEP brought to patient after PT session.   PT Discharge Planning   PT Plan Discharge   PT Discharge Recommendation (DC Rec) home with outpatient physical therapy;home with outpatient cardiac rehab   PT Rationale for DC Rec Patient is at baseline level of independence with mobility, but with mild VOR impairment possibly from recent head injury. Recommend outpatient vestibular PT in addition to the outpatient cardiac rehab she was previously attending. No further acute PT needs at this time.   PT Brief overview of current status Independent; Goals of therapy will be to address safe mobility and make recs for d/c to next level of care. Pt and RN will continue to follow all falls risk precautions as documented by RN staff while hospitalized.   PT Total Distance Amb During Session (feet) 105   Physical Therapy Time and Intention   Timed Code Treatment Minutes 24   Total Session Time (sum of timed and untimed services) 34     [facility] " [facility] Services                                                                                   OUTPATIENT PHYSICAL THERAPY    PLAN OF TREATMENT FOR OUTPATIENT REHABILITATION   Patient's Last Name, First Name, Margareth Clayton YOB: 1945   Provider's Name   [facility]   Medical Record No.  [id number]     Onset Date: 03/27/25 Start of Care Date: 03/29/25     Medical Diagnosis:  Nonspecific abnormal electrocardiogram (ECG) (EKG)               PT Diagnosis:  Dizziness Certification Dates:  From: 03/29/25  To: 03/29/25       See note for plan of treatment, functional goals, and certification details.    I CERTIFY THE NEED FOR THESE SERVICES FURNISHED UNDER        THIS PLAN OF TREATMENT AND WHILE UNDER MY CARE (Physician co-signature of this document indicates review and certification of the therapy plan).

## 2025-03-29 NOTE — CONSULTS
[facility]    Cardiology Progress Note     Date of Admission:  3/27/2025    Assessment & Plan     This is a 79 year old female with a history of recent acute heart failure with moderately reduced ejection fraction secondary to recently diagnosed stress-induced cardiomyopathy, hypertension, hyperlipidemia, paroxysmal atrial fibrillation, CVA, asthma, sarcoidosis, and GERD who presents with chest pain and rising Cr. Other labs notable for TSH elevation, TN to 24, Na 127, Cl 95, and Cr 0.99. NTproBNP 885 (down from 4700). ECG with inferolateral TW changes. CP free when seen yesterday. Cr improved. Echocardiogram shows improved LVEF. She does report abdominal swelling and leg swelling.    When seen by cardiology yesterday there was no indication for heparin infusion given her unremarkable recent coronary angiogram.  It was recommended for her to continue with her current medical therapy and to be seen by cardiology as an outpatient.    This morning she had an episode of recurrent chest pain.  This self resolved after 1 to 2 hours.  Her ECG yesterday demonstrated inferolateral T wave changes.  On today's ECG there precordial T wave changes and the inferior lateral changes have improved.  She is currently pain-free and hemodynamically stable.  Her troponins on 3/27 were 24 - 22 and today are 26 - 24.  Her recurrent pain is unlikely to be cardiac and given her recent unremarkable coronary angiogram I would not recommend treating this as an acute coronary syndrome.  Her ECG changes likely represent evolution of her recently diagnosed stress-induced cardiomyopathy.  Fortunately her echocardiogram demonstrates recovery of her ejection fraction although she does still have some residual apical hypokinesis which is consistent with what her ECG shows.    Recommendations:    1. No need for heparin infusion given recent coronary angiogram   2.  Reasonable to start torsemide 10 mg once daily tomorrow  3.  Continue eliquis for PAF, monitor telemetry  4. Continue metoprolol   5. Cardiology will sign off and follow up with patient as outpatient.  Outpatient orders have been placed.    Jesus Reyes [facility]    Physical Exam   Vital Signs with Ranges  Temp:  [97.7  F (36.5  C)-98.3  F (36.8  C)] 97.7  F (36.5  C)  Pulse:  [68-71] 68  Resp:  [16-18] 18  BP: (109-140)/(50-86) 118/55  SpO2:  [96 %-98 %] 96 %  Wt Readings from Last 4 Encounters:   03/27/25 59.4 kg (131 lb)   03/13/25 58.7 kg (129 lb 8 oz)   03/07/25 61.6 kg (135 lb 11.2 oz)   12/30/24 59 kg (130 lb)     I/O last 3 completed shifts:  In: 560 [P.O.:560]  Out: 1100 [Urine:1100]      Vitals: /55 (BP Location: Left arm)   Pulse 68   Temp 97.7  F (36.5  C) (Oral)   Resp 18   Wt 59.4 kg (131 lb)   LMP  (LMP Unknown)   SpO2 96%   BMI 23.21 kg/m      Physical Exam:   General - Alert and oriented to time place and person in no acute distress  Eyes - No scleral icterus  HEENT - Neck supple, moist mucous membranes  Cardiovascular - RRR no mrg  Extremities - There is trace edema  Respiratory - CTAB  Skin - No pallor or cyanosis  Gastrointestinal - Non tender. +distension.  Psych - Appropriate affect   Neurological - No gross motor neurological focal deficits    Recent Labs   Lab 03/29/25  0951 03/29/25  0911 03/28/25  1641 03/28/25  1203 03/28/25  0830 03/27/25  1459   WBC  --   --   --   --   --  6.8   HGB  --   --   --   --   --  12.2   MCV  --   --   --   --   --  84   PLT  --   --   --   --   --  322   *  --  127*  --  127* 129*   POTASSIUM 4.3  --  4.4  --  4.2 4.5   CHLORIDE 100  --  97*  --  95* 94*   CO2 22  --  23  --  23 24   BUN 15.6  --  18.5  --  18.6 25.5*   CR 0.59  --  0.60  --  0.63 0.99*   GFRESTIMATED >90  --  90  --  89 57*   ANIONGAP 9  --  7  --  9 11   SHINE 8.7*  --  8.8  --  8.8 9.4   GLC 93 102* 97   < > 110* 79   ALBUMIN  --   --   --   --   --  4.1   PROTTOTAL  --   --   --   --   --  6.6   BILITOTAL  --   --   --   --   --  0.3  "  ALKPHOS  --   --   --   --   --  78   ALT  --   --   --   --   --  18   AST  --   --   --   --   --  28   LIPASE  --   --   --   --   --  33    < > = values in this interval not displayed.     Recent Labs   Lab Test 03/06/25  1043 03/06/25  0618   CHOL 175 158   HDL 78 72   LDL 88 80   TRIG 47 30     Recent Labs   Lab 03/27/25  1459   WBC 6.8   HGB 12.2   HCT 35.5   MCV 84        No results for input(s): \"PH\", \"PHV\", \"PO2\", \"PO2V\", \"SAT\", \"PCO2\", \"PCO2V\", \"HCO3\", \"HCO3V\" in the last 168 hours.  Recent Labs   Lab 03/27/25  1459   NTBNPI 885     No results for input(s): \"DD\" in the last 168 hours.  No results for input(s): \"SED\", \"CRP\" in the last 168 hours.  Recent Labs   Lab 03/27/25  1459        Recent Labs   Lab 03/28/25  0830   TSH 4.55*     Recent Labs   Lab 03/28/25  1913   COLOR Straw   APPEARANCE Clear   URINEGLC Negative   URINEBILI Negative   URINEKETONE Negative   SG 1.007   UBLD Negative   URINEPH 6.5   PROTEIN Negative   NITRITE Negative   LEUKEST Negative       Imaging:  Recent Results (from the past 48 hours)   Chest XR,  PA & LAT    Narrative    EXAM: XR CHEST 2 VIEWS  LOCATION: 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 Mild to moderate perihilar reticular opacities are seen which may suggest atypical pneumonia. Consider further characterization with CT chest exam. Known pulmonary nodules are better seen on the   prior CT chest exam from 3/6/2025. 9 mm nodular density in the left lower lung was not definitively seen on the prior exam. This nodule can be further characterized on the CT exam suggested above. Multiple calcified granulomas are noted in the   mediastinum. Normal cardiomediastinal silhouette. Multilevel degenerative changes are seen in the spine with scoliotic curvature.   Head CT w/o contrast    Narrative    EXAM: CT HEAD W/O " CONTRAST  LOCATION: [facility]  DATE/TIME: 3/27/2025 3:42 PM CDT    INDICATION: Headache and nausea  COMPARISON: CT head dated 3/5/2025  TECHNIQUE: Routine CT Head without IV contrast. Multiplanar reformats. Dose reduction techniques were used.    FINDINGS:   INTRACRANIAL CONTENTS: No acute intracranial hemorrhage. No CT evidence of acute infarct. Sequelae of mild chronic microangiopathy. Mild cerebral volume loss without hydrocephalus. No extra-axial fluid collections.  Patent basal cisterns.     VISUALIZED ORBITS/SINUSES/MASTOIDS: Postoperative changes of bilateral lenses, otherwise the orbits are unremarkable. The visualized paranasal sinuses and temporal bone structures are well-aerated.     BONES/SOFT TISSUES: Probable right frontal bone hemangioma, otherwise the calvarium and skull base are unremarkable.       Impression    IMPRESSION:     1. Senescent changes and sequelae of chronic microangiopathy without acute intracranial abnormality.       Clinically Significant Risk Factors Present on Admission         # Hyponatremia: Lowest Na = 127 mmol/L in last 2 days, will monitor as appropriate  # Hypochloremia: Lowest Cl = 95 mmol/L in last 2 days, will monitor as appropriate         # Drug Induced Coagulation Defect: home medication list includes an anticoagulant medication    # Hypertension: Home medication list includes antihypertensive(s)               # Financial/Environmental Concerns:    # Asthma: noted on problem list       Cardiomyopathy

## 2025-03-29 NOTE — PROGRESS NOTES
[facility]  Hospitalist Progress Note   03/29/2025          Assessment and Plan:       Margareth Hogue is an 79 year old female with a history of recent acute heart failure with moderately reduced ejection fraction secondary to recently diagnosed stress-induced cardiomyopathy, hypertension, hyperlipidemia, paroxysmal atrial fibrillation, CVA, asthma, sarcoidosis, and GERD admitted on 3/27/2025 for chest pain     Admitted 3/6 -3/8 following a fall.  Concern for NSTEMI during that admission, Echocardiogram 3/7 with LVEF of 35 to 40%.  Moderate to severe anterior, septal and apical wall hypokinesis. Coronary angiogram which showed largely normal coronary arteries but also apical wall ballooning consistent with stress-induced cardiomyopathy.    Readmitted 3/12 - 3/13 due to CHF, discharged home with addition of lasix 20 mg daily.    Chest pain recurrent -likely ? secondary to ACS.  Dizziness -improving.  Heart failure with reduced ejection fraction -improving.  Now EF to 50 to 55%  Recent diagnosis of stress-induced cardiomyopathy.  Paroxysmal atrial fibrillation on anticoagulation.  Hypertension. Hyperlipidemia  Report developed chest pain, nausea, and lightheadedness on the morning of presentation while using a stationary bike at cardiac rehab.  Symptoms improved but did not resolve, she was able to go home from cardiac rehab.  She called the clinic and was directed into the ED for evaluation.  Symptoms resolved by the time she arrived in the ED.    -Troponin of 24 >22a; EKG with some new T wave changes in the anterolateral leads.    Chest x-ray with some mild perihilar opacities, no other acute abnormalities;   ProBNP 885, down from 4,754 two weeks ago.  Echo 3/28 with LVEF of 50 to 55%.  Mild apical wall hypokinesis.  No significant valvular heart disease.    --3/29 -RRT this morning for chest pain. (R sided chest pain radiating into her shoulder while working with PT. ) received sublingual nitro  without any improvement in chest pain.  EKG sinus rhythm with new T wave inversions.  Cardiology reconsulted 3/29.  Patient comanagement  Continue PTA Eliquis.  Continue PTA Toprol XL with hold parameters.  Continue PTA valsartan.  Holding PTA Lasix.  Telemetry monitoring.  Intake output monitoring, daily weights.  Compression stockings.    Acute kidney injury - improved   Creatinine 0.99, up from baseline of ~ 0.6-0.7.  Ultrasound abdomen no acute findings  UA nitrite negative, leukocyte esterase negative.  No blood.  BMP in AM.    Hypochloremic hyponatremia.  Presented with sodium of 129, down from low 130s earlier this month.  Sodium dropped to 127 with IV fluids, now improving to 131.  TSH 4.55, T4 within normal limits.  Urine sodium 23.  Liberalize salt in diet.  BMP in am     History of CVA  Continue PTA Eliquis, Lipitor.    Asthma  History of sarcoidosis  No symptoms at the time of admission.  Continue PTA Trelegy or formulary equivalent.  Can resume other PTA inhalers/nebs if needed.     GERD  Continue PTA omeprazole or formulary equivalent.    Physical deconditioning from medical illness, senile frailty.  Lives at home with her .  PT evaluation requested.  Fall precautions     Clinically Significant Risk Factors Present on Admission         # Hyponatremia: Lowest Na = 127 mmol/L in last 2 days, will monitor as appropriate  # Hypochloremia: Lowest Cl = 94 mmol/L in last 2 days, will monitor as appropriate         # Drug Induced Coagulation Defect: home medication list includes an anticoagulant medication    # Hypertension: Home medication list includes antihypertensive(s)              # Financial/Environmental Concerns:    # Asthma: noted on problem list      Orders Placed This Encounter      Combination Diet Regular Diet Adult      DVT Prophylaxis: SCDs, ambulate  Code Status: Full Code  Disposition: Expected discharge in 1 to 2 days pending clinical improvement, cardiology input.     Medically Ready  for Discharge: Anticipated Tomorrow     Discussed with patient, bedside RN, care team.  >35 minutes spent by me on the date of service doing chart review, history, exam, documentation & further activities per the note.     Ascencion Godfrey MD        Interval History:        Patient lying in bed.  RRT this morning for chest pain. (R sided chest pain radiating into her shoulder while working with PT. ) received sublingual nitro without any improvement in chest pain.  EKG sinus rhythm with new T wave inversions  Reports some dizziness with ambulation, improved since yesterday.  Alert oriented can answer most questions.  Denies any new tingling or numbness.  Denies any focal weakness.  Denies abdominal pain.  Denies any blood in the stools or blood in the urine.  Telemetry sinus rhythm.           Physical Exam:        Physical Exam   Temp:  [97.7  F (36.5  C)-98.4  F (36.9  C)] 97.7  F (36.5  C)  Pulse:  [67-71] 68  Resp:  [16-18] 16  BP: (109-140)/(50-86) 123/64  SpO2:  [97 %-98 %] 98 %    Intake/Output Summary (Last 24 hours) at 3/28/2025 1821  Last data filed at 3/28/2025 1300  Gross per 24 hour   Intake 540 ml   Output 1000 ml   Net -460 ml       Admission Weight: 59.4 kg (131 lb)  Current Weight: 59.4 kg (131 lb)    PHYSICAL EXAM  GENERAL: Patient is in no distress. Alert and oriented.  HEENT: Oropharynx pink, no nystagmus.  HEART: Regular rate and rhythm. S1S2. No murmurs  LUNGS: Clear to auscultation bilaterally. No expiratory wheeze.  Respirations unlabored  NEURO: Moving all extremities, no focal weakness.  EXTREMITIES: No pedal edema.   SKIN: Warm, dry. No rash   PSYCHIATRY Cooperative       Medications:        Current Facility-Administered Medications   Medication Dose Route Frequency Provider Last Rate Last Admin    albuterol (PROVENTIL HFA/VENTOLIN HFA) inhaler  2 puff Inhalation At Bedtime Eklof, Jose Juárez MD   2 puff at 03/28/25 2106    apixaban ANTICOAGULANT (ELIQUIS) tablet 2.5 mg  2.5 mg Oral  BID Jose Peña MD   2.5 mg at 03/29/25 1051    [Held by provider] atorvastatin (LIPITOR) tablet 10 mg  10 mg Oral Daily Jose Peña MD        cetirizine (zyrTEC) tablet 10 mg  10 mg Oral QPM Jose Peña MD   10 mg at 03/28/25 2106    docusate sodium (COLACE) capsule 100 mg  100 mg Oral QPM Jose Peña MD   100 mg at 03/28/25 2106    fluticasone-vilanterol (BREO ELLIPTA) 100-25 MCG/ACT inhaler 1 puff  1 puff Inhalation Daily Jose Peña MD        And    umeclidinium (INCRUSE ELLIPTA) 62.5 MCG/ACT inhaler 1 puff  1 puff Inhalation Daily Jose Peña MD        metoprolol succinate ER (TOPROL XL) 24 hr tablet 25 mg  25 mg Oral Daily Jose Peña MD   25 mg at 03/29/25 1051    sodium chloride (PF) 0.9% PF flush 3 mL  3 mL Intracatheter Q8H VANESSA Jose Peña MD   3 mL at 03/29/25 1054    [START ON 3/30/2025] torsemide (DEMADEX) tablet 10 mg  10 mg Oral Daily Florence Reyes MD        valsartan (DIOVAN) tablet 40 mg  40 mg Oral QPM Florence Reyes MD         Current Facility-Administered Medications   Medication Dose Route Frequency Provider Last Rate Last Admin    acetaminophen (TYLENOL) tablet 650 mg  650 mg Oral Q4H PRN Jose Peña MD   650 mg at 03/28/25 0158    Or    acetaminophen (TYLENOL) Suppository 650 mg  650 mg Rectal Q4H PRN Jose Peña MD        alum & mag hydroxide-simethicone (MAALOX) suspension 30 mL  30 mL Oral Q4H PRN Jose Peña MD        lidocaine (LMX4) cream   Topical Q1H PRN Jose Peña MD        lidocaine 1 % 0.1-1 mL  0.1-1 mL Other Q1H PRN Jose Peña MD        nitroGLYcerin (NITROSTAT) sublingual tablet 0.4 mg  0.4 mg Sublingual Q5 Min PRN EkJose chambers MD   0.4 mg at 03/29/25 0913    sodium chloride (PF) 0.9% PF flush 3 mL  3 mL Intracatheter q1 min prn EkJose chambers MD   3 mL at 03/28/25 0934             Data:      All new lab and imaging data was reviewed.

## 2025-03-29 NOTE — PROGRESS NOTES
Observation goals  PRIOR TO DISCHARGE        Comments: List all goals to be met before discharge home: not met  - Serial troponins and stress test complete- met  - Seen and cleared by consultant if applicable- met  - Adequate pain control on oral analgesia- met  - Vital signs normal or at patient baseline- not met, ortho bp positive  - Safe disposition plan has been identified- not met, PT pending  - Nurse to notify provider when observation goals have been met and patient is ready for discharge.

## 2025-03-29 NOTE — PLAN OF CARE
Shift [id number]   Goal Outcome Evaluation:  PRIMARY Concern: admitted for chest pain. ANGEL, hyponatremia.  SAFETY RISK Concerns (fall risk, behaviors, etc.): Fall risk      Aggression Tool Color: Green  Isolation/Type: N/A  Tests/Procedures for NEXT shift: US Abdomen resulted normal. UA-neg. AM labs.  Consults? (Pending/following, signed-off?) Cardiology signed off. PT consult pending  Where is patient from? (Home, TCU, etc.): Home with spouse.  Other Important info for NEXT shift: Dizziness while standing. Pt requested to sleep through the night without interruptions.    Anticipated DC date & active delays: TBD. Pending clinical improvement     SUMMARY NOTE:   Orientation/Cognitive: A&Ox4.   Observation Goals (Met/ Not Met): Partially met  Mobility Level/Assist Equipment: SBA refused walker   Antibiotics & Plan (IV/po, length of tx left): N/A  Pain Management: Denies any pain.  PRN Tylenol available  Complete Pain Reassessment: Y  Due next: shift   Tele/VS/O2: VSS on RA. Ortho BP positive on MD terrance paged, aware, encouraged po fluids and rest.  ABNL Lab/BG: Na- 127, TSH- 4.55  Diet: Reg, Fluid Restriction 2 Liters   Bowel/Bladder: Cont B/B. voiding  Skin Concerns: Dry skin   Drains/Devices: PIV SL  Patient Stated Goal for Today: sleep/rest

## 2025-03-29 NOTE — PLAN OF CARE
Physical Therapy Discharge Summary    Reason for therapy discharge:    All goals and outcomes met, no further needs identified.    Progress towards therapy goal(s). See goals on Care Plan in Epic electronic health record for goal details.  Goals met    Therapy recommendation(s):    Continued therapy is recommended.  Rationale/Recommendations:  outpatient vestibular PT and outpatient cardiac rehab.

## 2025-03-29 NOTE — PLAN OF CARE
Goal Outcome Evaluation:      Plan of Care Reviewed With: patient    PRIMARY Concern: admitted for chest pain. ANGEL, hyponatremia.  SAFETY RISK Concerns (fall risk, behaviors, etc.): Fall risk      Aggression Tool Color: Green  Isolation/Type: N/A  Tests/Procedures for NEXT shift: US Abdomen resulted normal. UA-neg. AM labs.  Consults? (Pending/following, signed-off?) cardiology signed off. PT consult pending  Where is patient from? (Home, TCU, etc.): Home with spouse.  Other Important info for NEXT shift: Denies dizziness ex upon standing. Pt requesting not to wake her up at night when sleeping. VS done for the night.   Anticipated DC date & active delays: TBD. Pending clinical improvement     SUMMARY NOTE:   Orientation/Cognitive: AOX4.   Observation Goals (Met/ Not Met): OBS  Mobility Level/Assist Equipment: SBA refused walker   Antibiotics & Plan (IV/po, length of tx left): N/A  Pain Management: Denies any pain.  Prn Tylenol available  Complete Pain Reassessment: Y/N  Due next: shift   Tele/VS/O2: VSS on RA. Ortho BP positive, MD paged, aware, encouraged po fluids and rest.  ABNL Lab/BG: Na- 127, TSH- 4.55  Diet: Reg, FR 2 Liters   Bowel/Bladder: Cont B/B  Skin Concerns: dry skin   Drains/Devices: PIV SL  Patient Stated Goal for Today: sleep/rest

## 2025-03-29 NOTE — PLAN OF CARE
Goal Outcome Evaluation:  PRIMARY Concern: admitted for chest pain. ANGEL, hyponatremia.  SAFETY RISK Concerns (fall risk, behaviors, etc.): Fall risk      Aggression Tool Color: Green  Isolation/Type: N/A  Tests/Procedures for NEXT shift: N/A  Consults? (Pending/following, signed-off?) Cardiology,  PT following   Where is patient from? (Home, TCU, etc.): Home with spouse.  Other Important info for NEXT shift: RRT this AM for chest pain. Denies dizziness this shift.  Anticipated DC date & active delays: TBD. Pending clinical improvement     SUMMARY NOTE:03/29/25; [id number]   Orientation/Cognitive: A&Ox4.   Observation Goals (Met/ Not Met): In pt   Mobility Level/Assist Equipment: SBA/GB. refused walker   Antibiotics & Plan (IV/po, length of tx left): N/A  Pain Management: Chest pain x 1. PRN SL nitroglycerine x1 given  Complete Pain Reassessment: Y . Due next shift   Tele/VS/O2: VSS on RA.  ABNL Lab/BG: see chart   Diet: Reg, Fluid Restriction 2 Liters   Bowel/Bladder: Cont B/B. Voiding. Had BM x1   Skin Concerns: Dry skin   Drains/Devices: PIV SL  Patient Stated Goal for Today: sleep/rest   Additional Info: pt had RRT this morning for chest pain. EKG done. Received SL nitroglycerine x.  Cardiology re-consulted. Currently chest pain free.

## 2025-03-30 VITALS
SYSTOLIC BLOOD PRESSURE: 135 MMHG | HEART RATE: 70 BPM | OXYGEN SATURATION: 98 % | DIASTOLIC BLOOD PRESSURE: 71 MMHG | TEMPERATURE: 98.9 F | WEIGHT: 131 LBS | BODY MASS INDEX: 23.21 KG/M2 | RESPIRATION RATE: 18 BRPM

## 2025-03-30 LAB
ATRIAL RATE - MUSE: 69 BPM
DIASTOLIC BLOOD PRESSURE - MUSE: NORMAL MMHG
INTERPRETATION ECG - MUSE: NORMAL
P AXIS - MUSE: 6 DEGREES
PR INTERVAL - MUSE: 136 MS
QRS DURATION - MUSE: 96 MS
QT - MUSE: 436 MS
QTC - MUSE: 467 MS
R AXIS - MUSE: -10 DEGREES
SODIUM SERPL-SCNC: 130 MMOL/L (ref 135–145)
SYSTOLIC BLOOD PRESSURE - MUSE: NORMAL MMHG
T AXIS - MUSE: 88 DEGREES
VENTRICULAR RATE- MUSE: 69 BPM

## 2025-03-30 PROCEDURE — 84295 ASSAY OF SERUM SODIUM: CPT | Performed by: HOSPITALIST

## 2025-03-30 PROCEDURE — 36415 COLL VENOUS BLD VENIPUNCTURE: CPT | Performed by: HOSPITALIST

## 2025-03-30 PROCEDURE — 99239 HOSP IP/OBS DSCHRG MGMT >30: CPT | Performed by: HOSPITALIST

## 2025-03-30 PROCEDURE — 250N000013 HC RX MED GY IP 250 OP 250 PS 637: Performed by: INTERNAL MEDICINE

## 2025-03-30 PROCEDURE — 250N000013 HC RX MED GY IP 250 OP 250 PS 637: Performed by: HOSPITALIST

## 2025-03-30 RX ORDER — NITROGLYCERIN 0.4 MG/1
TABLET SUBLINGUAL
Qty: 5 TABLET | Refills: 0 | Status: SHIPPED | OUTPATIENT
Start: 2025-03-30

## 2025-03-30 RX ORDER — VALSARTAN 40 MG/1
40 TABLET ORAL EVERY EVENING
Qty: 30 TABLET | Refills: 0 | Status: SHIPPED | OUTPATIENT
Start: 2025-03-30 | End: 2025-04-03

## 2025-03-30 RX ORDER — TORSEMIDE 10 MG/1
10 TABLET ORAL DAILY
Qty: 30 TABLET | Refills: 0 | Status: SHIPPED | OUTPATIENT
Start: 2025-03-31

## 2025-03-30 RX ADMIN — METOPROLOL SUCCINATE 25 MG: 25 TABLET, EXTENDED RELEASE ORAL at 07:54

## 2025-03-30 RX ADMIN — APIXABAN 2.5 MG: 2.5 TABLET, FILM COATED ORAL at 07:54

## 2025-03-30 RX ADMIN — TORSEMIDE 10 MG: 10 TABLET ORAL at 07:54

## 2025-03-30 RX ADMIN — FLUTICASONE FUROATE AND VILANTEROL TRIFENATATE 1 PUFF: 100; 25 POWDER RESPIRATORY (INHALATION) at 07:55

## 2025-03-30 RX ADMIN — UMECLIDINIUM 1 PUFF: 62.5 AEROSOL, POWDER ORAL at 07:55

## 2025-03-30 ASSESSMENT — ACTIVITIES OF DAILY LIVING (ADL)
ADLS_ACUITY_SCORE: 49

## 2025-03-30 NOTE — DISCHARGE SUMMARY
Discharge Summary  Hospitalist    Date of Admission:  3/27/2025  Date of Discharge:  3/30/2025  Discharging Provider: Ascencion Godfrey MD    Primary Care Physician   Nadira Rob  Primary Care Provider Phone Number: [phone]  Primary Care Provider Fax Number: [phone]    PRINCIPAL DIAGNOSIS  Chest pain recurrent - improved   Physical deconditioning from medical illness, senile frailty.  Acute on chronic dizziness improving.  Headache improved.  Acute kidney injury - improved   Acute on chronic hyponatremia  Hypochloremia -improved.  Pulmonary nodules.  Mild to moderate perihilar reticular opacities.  Nausea improved.    Past Medical History:   Diagnosis Date    Asthma     GERD (gastroesophageal reflux disease)     Heart failure with reduced ejection fraction (H)     Paroxysmal atrial fibrillation (H)     Stress-induced cardiomyopathy        History of Present Illness   Margareth Hogue is an 79 year old female who presented with chest pain.     Hospital Course     Margareth Hogue is an 79 year old female with a history of recent acute heart failure with moderately reduced ejection fraction secondary to recently diagnosed stress-induced cardiomyopathy, hypertension, hyperlipidemia, paroxysmal atrial fibrillation, CVA, asthma, sarcoidosis, and GERD admitted on 3/27/2025 for chest pain      Admitted 3/6 -3/8 following a fall.  Concern for NSTEMI during that admission, Echocardiogram 3/7 with LVEF of 35 to 40%.  Moderate to severe anterior, septal and apical wall hypokinesis. Coronary angiogram which showed largely normal coronary arteries but also apical wall ballooning consistent with stress-induced cardiomyopathy.    Readmitted 3/12 - 3/13 due to CHF, discharged home with addition of lasix 20 mg daily.     Chest pain recurrent - improved   Heart failure with reduced ejection fraction - improved.  Now EF to 50 to 55%  Recent diagnosis of stress-induced cardiomyopathy [EF 35 -40%].  Paroxysmal atrial  fibrillation on anticoagulation.  Hypertension. Hyperlipidemia  Report developed chest pain, nausea and lightheadedness on the morning of presentation while using a stationary bike at cardiac rehab.  No shortness of breath.  No fever or chills.  Symptoms improved but did not resolve, she was able to go home from cardiac rehab.  She called the clinic and was directed into the ED for evaluation.  Symptoms resolved by the time she arrived in the ED.    -Troponin of 24 >22; EKG with some new T wave changes in the anterolateral leads.    ProBNP 885, down from 4,754 two weeks ago.  --Patient was admitted to the observation unit, prior to admission Eliquis continued.  Echo 3/28 with LVEF of 50 to 55%.  Mild apical wall hypokinesis.  No significant valvular heart disease.    --On 3/29 -RRT for chest pain. (R sided chest pain radiating into her shoulder while working with PT. ) received sublingual nitro without any improvement in chest pain.  EKG sinus rhythm with new T wave inversions.  Relief of pain with nitro.  Chest pain resolved after 1 to 2 hours.  Troponin repeat 26 > 24.     Cardiology followed 3/29, recommend to continue current management.    Continue PTA Eliquis.  Continue PTA Toprol XL with hold parameters.  Continue PTA valsartan with hold parameters.  Discontinued PTA furosemide, started on torsemide 3/30 with hold parameters.  Continue torsemide on discharge.  Monitor electrolytes, renal function in 5 to 7 days  Monitor daily weights, blood pressure, heart rate, review on provider visit and optimize therapy.  Cardiac rehabilitation as outpatient.  Has cardiology follow-up appointment with ANTONY on 4/4.  Follow-up with PCP within 7 days.  Recheck BMP in 5 days.  [Patient follows with [facility], we can discharge patient reports will follow-up for her appointment]    Physical deconditioning from medical illness, senile frailty.  Acute on chronic dizziness improving.  Headache improved.  Report of  headache, nausea, dizziness on admission.  Lives at home with her .  No recent falls.  CT head without acute intracranial abnormality.  Continue PTA as needed meclizine.  Compression stockings.  Fall precautions.  Rehab team followed, outpatient rehabilitation  If dizziness persist consider ENT evaluation as outpatient.    Acute kidney injury - improved   Creatinine 0.99, up from baseline of ~ 0.6-0.7.  Ultrasound abdomen no acute findings  UA nitrite negative, leukocyte esterase negative.  No blood.  BMP in 5 days or earlier if symptomatic.     Acute on chronic hyponatremia  Hypochloremia -improved.  Presented with sodium of 129, down from low 130s earlier this month.  Baseline sodium in 130s.  Sodium dropped to 127 with IV fluids, now improved to 130.  TSH 4.55, T4 within normal limits.  Urine sodium 23.  Liberalize salt in diet.  2000 mL fluid restriction.  Monitor BMP in 5 to 7 days while on torsemide.    Pulmonary nodules.  Mild to moderate perihilar reticular opacities.  History of asthma not in exacerbation.  History of sarcoidosis  No shortness of breath.  No hypoxia.  Afebrile.  No leukocytosis  Chest x-ray with Mild to moderate perihilar reticular opacities are seen which may suggest atypical pneumonia.  Known pulmonary nodules are better seen on the  prior CT chest exam from 3/6/2025. 9 mm nodular density in the left lower lung was not definitively seen on the prior exam. Multiple calcified granulomas are noted in the  mediastinum. Normal cardiomediastinal silhouette. Multilevel degenerative changes are seen in the spine with scoliotic curvature.  Continue PTA inhaler therapy.  Recommend close follow-up chest imaging with CT in 12 weeks or earlier if symptomatic. Can consider pulmonology evaluation as outpatient.    Nausea improved.  No report of abdominal pain, diarrhea, lipase 33.  Liver enzymes within normal limits.  Ultrasound abdomen no acute pathology.  Tolerating oral diet.     History of  CVA  Continue PTA Eliquis, Lipitor.    GERD  Continue PTA omeprazole     Ascencion Godfrey MD.    Pending Results   None          Physical Exam   Vitals:    03/27/25 1334   Weight: 59.4 kg (131 lb)     Vital Signs with Ranges  Temp:  [98.1  F (36.7  C)-98.9  F (37.2  C)] 98.9  F (37.2  C)  Pulse:  [68-73] 70  Resp:  [18] 18  BP: (114-135)/(50-71) 135/71  SpO2:  [96 %-98 %] 98 %  I/O last 3 completed shifts:  In: 810 [P.O.:810]  Out: 800 [Urine:800]  PHYSICAL EXAM  GENERAL: Patient is in no distress. Alert and oriented.  HEENT: Oropharynx pink, no nystagmus.  HEART: Regular rate and rhythm. S1S2. No murmurs  LUNGS: Respirations unlabored  NEURO: Moving all extremities, no focal weakness.  EXTREMITIES: No pedal edema.   SKIN: Warm, dry. No rash   PSYCHIATRY Cooperative  )Consultations This Hospital Stay   CARDIOLOGY IP CONSULT  CARE MANAGEMENT / SOCIAL WORK IP CONSULT  PHYSICAL THERAPY ADULT IP CONSULT  CARDIOLOGY IP CONSULT    Time Spent on this Encounter   I, Ascencion Godfrey MD, personally saw the patient today and spent greater than 30 minutes discharging this patient.. Discussed with patient, bedside RN.    Discharge Disposition   Discharged to home.  Condition at discharge: Fair.    Discharge Orders      Reason for your hospital stay    You were admitted to the hospital with chest pain, followed by hospitalist team, cardiology.  Some improvement in symptoms.  Echocardiogram shows improvement in heart function.  Prior to admission Lasix switched to torsemide.  Plan for discharge home with close follow-up.     Activity    Your activity upon discharge: activity as tolerated     Discharge Instructions    Fall precautions.  Can consider compression stockings if ongoing dizziness.  Gradually move from lying to sitting position prior to up and moving around.     Monitor and record    Monitor daily blood pressure, weight, heart rate review on provider visit and optimize therapy.     Follow Up    Follow-up with PCP  within 7 days of discharge earlier if symptomatic.  Follow-up with cardiology in clinic per schedule.  Consider ENT evaluation if ongoing dizziness.  Rehabilitation as outpatient.    Noted lung nodules on CT during previous admission.  Recommend close follow-up chest imaging with CT in 12 weeks or earlier if symptomatic. Can consider pulmonology evaluation as outpatient.     Diet    Follow this diet upon discharge: Current Diet:Orders Placed This Encounter      Fluid restriction 2000 ML FLUID      Combination Diet Regular Diet Adult     Hospital Follow-up with Existing Primary Care Provider (PCP)    Please see details below            Discharge Medications   Current Discharge Medication List        START taking these medications    Details   nitroGLYcerin (NITROSTAT) 0.4 MG sublingual tablet For chest pain place 1 tablet under the tongue every 5 minutes for 3 doses. If symptoms persist 5 minutes after 1st dose call 911.  Maximum 3 doses in 15 minutes. Notify provider if no relief after 3 doses. Do NOT give nitroGLYcerin SL if the patient has received sildenafil (VIAGRA/REVATIO), avanafil (STENDRA) or vardenafil (LEVITRA/STAXYN) within the last 24 hours, OR tadalafil (CIALIS/ADCIRCA) within the last 48 hours.  Inform provider if patient has taken one of those medications.  Qty: 5 tablet, Refills: 0    Associated Diagnoses: Chest pain, unspecified type      torsemide (DEMADEX) 10 MG tablet Take 1 tablet (10 mg) by mouth daily. Hold for systolic blood pressure less than 100.  Qty: 30 tablet, Refills: 0    Associated Diagnoses: Benign essential hypertension           CONTINUE these medications which have CHANGED    Details   valsartan (DIOVAN) 40 MG tablet Take 1 tablet (40 mg) by mouth every evening. Hold if systolic blood pressure less than 100.  Qty: 30 tablet, Refills: 0    Associated Diagnoses: Heart failure with reduced ejection fraction, NYHA class I (H)           CONTINUE these medications which have NOT CHANGED     Details   albuterol (PROAIR HFA/PROVENTIL HFA/VENTOLIN HFA) 108 (90 Base) MCG/ACT inhaler Inhale 2 puffs into the lungs at bedtime. And Q6H as needed      apixaban ANTICOAGULANT (ELIQUIS) 2.5 MG tablet Take 2.5 mg by mouth 2 times daily.      atorvastatin (LIPITOR) 10 MG tablet Take 1 tablet (10 mg) by mouth daily.  Qty: 90 tablet, Refills: 3    Associated Diagnoses: Hyperlipidemia LDL goal <130      budesonide (PULMICORT) 0.5 MG/2ML neb solution Take 0.5 mg by nebulization daily as needed (short of breath, wheezing).      calcium carbonate (OS-SHINE) 1500 (600 Ca) MG tablet Take 600 mg by mouth daily.      cetirizine (ZYRTEC) 10 MG tablet Take 10 mg by mouth every evening.      conjugated estrogens (PREMARIN) 0.625 MG/GM vaginal cream Place vaginally daily as needed.      docusate sodium (DSS) 100 MG capsule Take 1 capsule by mouth every evening.      fish oil-omega-3 fatty acids 1000 MG capsule Take 1 g by mouth 2 times daily.      fluticasone (FLONASE) 50 MCG/ACT nasal spray Spray 1 spray into both nostrils 2 times daily.      ipratropium (ATROVENT) 0.02 % neb solution Inhale 0.5 mg into the lungs every 6 hours as needed for wheezing.      ketoconazole (NIZORAL) 2 % external cream Apply topically daily as needed for itching or irritation. To feet      meclizine (ANTIVERT) 12.5 MG tablet Take 12.5 mg by mouth 3 times daily as needed for dizziness.      metoprolol succinate ER (TOPROL XL) 25 MG 24 hr tablet Take 1 tablet (25 mg) by mouth daily.  Qty: 30 tablet, Refills: 2    Associated Diagnoses: Takotsubo cardiomyopathy      omeprazole (PRILOSEC) 20 MG DR capsule Take 20 mg by mouth daily as needed.      polyethylene glycol (MIRALAX) 17 g packet Take 1 packet by mouth daily as needed for constipation.      polyethylene glycol-propylene glycol (SYSTANE ULTRA) 0.4-0.3 % SOLN ophthalmic solution Place 1-2 drops into both eyes 4 times daily as needed for dry eyes.      sodium chloride (OCEAN) 0.65 % nasal spray Spray  1 spray into both nostrils 2 times daily.      TRELEGY ELLIPTA 100-62.5-25 MCG/ACT oral inhaler Inhale 2 puffs into the lungs daily           STOP taking these medications       furosemide (LASIX) 20 MG tablet Comments:   Reason for Stopping:             Allergies   Allergies   Allergen Reactions    Sulfa Antibiotics Unknown     Long time ago, she cannot remember    Diazepam Other (See Comments)     Makes more anxious    Meperidine Nausea and Vomiting and Unknown    Morphine Nausea and Unknown    Penicillins Itching    Zafirlukast      She does not remember       DATA  Most Recent 3 CBC's:  Recent Labs   Lab Test 03/27/25  1459 03/13/25  0724 03/12/25  1325   WBC 6.8 6.0 6.0   HGB 12.2 11.0* 11.1*   MCV 84 86 87    358 291      Most Recent 3 BMP's:  Recent Labs   Lab Test 03/30/25  0954 03/29/25  0951 03/29/25  0911 03/28/25  1641 03/28/25  1203 03/28/25  0830   * 131*  --  127*  --  127*   POTASSIUM  --  4.3  --  4.4  --  4.2   CHLORIDE  --  100  --  97*  --  95*   CO2  --  22  --  23  --  23   BUN  --  15.6  --  18.5  --  18.6   CR  --  0.59  --  0.60  --  0.63   ANIONGAP  --  9  --  7  --  9   SHINE  --  8.7*  --  8.8  --  8.8   GLC  --  93 102* 97   < > 110*    < > = values in this interval not displayed.     Most Recent 2 LFT's:  Recent Labs   Lab Test 03/27/25  1459 03/05/25  2323   AST 28 23   ALT 18 20   ALKPHOS 78 90   BILITOTAL 0.3 0.5     Most Recent Cholesterol Panel:  Recent Labs   Lab Test 03/06/25  1043   CHOL 175   LDL 88   HDL 78   TRIG 47     Most Recent TSH, T4 and A1c Labs:  Recent Labs   Lab Test 03/28/25  0830 12/30/24  1427   TSH 4.55*  --    T4 1.13  --    A1C  --  5.6     Results for orders placed or performed during the hospital encounter of 03/27/25   Chest XR,  PA & LAT    Narrative    EXAM: XR CHEST 2 VIEWS  LOCATION: [facility]  DATE: 3/27/2025    INDICATION: chest pain  COMPARISON: 3/12/2025      Impression    IMPRESSION: 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 Mild to moderate perihilar reticular opacities are seen which may suggest atypical pneumonia. Consider further characterization with CT chest exam. Known pulmonary nodules are better seen on the   prior CT chest exam from 3/6/2025. 9 mm nodular density in the left lower lung was not definitively seen on the prior exam. This nodule can be further characterized on the CT exam suggested above. Multiple calcified granulomas are noted in the   mediastinum. Normal cardiomediastinal silhouette. Multilevel degenerative changes are seen in the spine with scoliotic curvature.   Head CT w/o contrast    Narrative    EXAM: CT HEAD W/O CONTRAST  LOCATION: [facility]  DATE/TIME: 3/27/2025 3:42 PM CDT    INDICATION: Headache and nausea  COMPARISON: CT head dated 3/5/2025  TECHNIQUE: Routine CT Head without IV contrast. Multiplanar reformats. Dose reduction techniques were used.    FINDINGS:   INTRACRANIAL CONTENTS: No acute intracranial hemorrhage. No CT evidence of acute infarct. Sequelae of mild chronic microangiopathy. Mild cerebral volume loss without hydrocephalus. No extra-axial fluid collections.  Patent basal cisterns.     VISUALIZED ORBITS/SINUSES/MASTOIDS: Postoperative changes of bilateral lenses, otherwise the orbits are unremarkable. The visualized paranasal sinuses and temporal bone structures are well-aerated.     BONES/SOFT TISSUES: Probable right frontal bone hemangioma, otherwise the calvarium and skull base are unremarkable.       Impression    IMPRESSION:     1. Senescent changes and sequelae of chronic microangiopathy without acute intracranial abnormality.   US Abdomen Limited    Narrative    EXAM: US ABDOMEN LIMITED  LOCATION: [facility]  DATE: 3/28/2025    INDICATION: Abdominal swelling  COMPARISON: None.  TECHNIQUE: Limited abdominal ultrasound.    FINDINGS:    GALLBLADDER: Normal. No gallstones, wall thickening, or  pericholecystic fluid. Negative sonographic Morales's sign.    BILE DUCTS: No biliary dilatation. The common duct measures 7 mm.    LIVER: Normal parenchyma with smooth contour. No focal mass. The portal vein is patent with flow in the normal direction.    RIGHT KIDNEY: No hydronephrosis.    PANCREAS: The visualized portions are normal.    No ascites.      Impression    IMPRESSION:  1.  Normal limited abdominal ultrasound.       Echocardiogram Limited     Value    LVEF  50-55%    [facility]    [id number]  [id number]  [id number]  [id number]^WILTON     [facility]  Echocardiography Laboratory  [address] [place] [address]     Name: SAMEER KIRKPATRICK  MRN: [id number]  : 1945  Study Date: 2025 11:09 AM  Age: 79 yrs  Gender: Female  Patient Location: [facility]  Reason For Study: CHF  Ordering Physician: FAUZIA GALVIN  Performed By: COREEN Hernandez     BSA: 1.6 m2  Height: 63 in  Weight: 131 lb  HR: 53  BP: 125/75 mmHg  ______________________________________________________________________________  Procedure  Definity ([facility] #34264-871) given intravenously. Limited Echocardiogram with  two-dimensional, color and spectral Doppler. Contrast Definity. Technically  difficult study.  ______________________________________________________________________________  Interpretation Summary     The visual ejection fraction is 50-55%.  There is mild apical wall hypokinesis.  No significant valvular heart disease.  ______________________________________________________________________________  Left Ventricle  The left ventricle is normal in size. There is normal left ventricular wall  thickness. The visual ejection fraction is 50-55%. There is mild apical wall  hypokinesis.     Right Ventricle  The right ventricle is normal in size and function.     Atria  Normal left atrial size. Right atrial size is normal.     Mitral Valve  The mitral valve leaflets are mildly thickened. There  is trace to mild mitral  regurgitation.     Tricuspid Valve  There is trace to mild tricuspid regurgitation. IVC diameter and respiratory  changes fall into an intermediate range suggesting an RA pressure of 8 mmHg.  The right ventricular systolic pressure is approximated at 17.5 mmHg plus the  right atrial pressure.     Aortic Valve  There is trivial trileaflet aortic sclerosis. No aortic regurgitation is  present.     Pulmonic Valve  There is trace pulmonic valvular regurgitation.     Pericardium  There is no pericardial effusion.     Rhythm  Sinus rhythm was noted.     ______________________________________________________________________________  MMode/2D Measurements & Calculations  IVSd: 0.70 cm  LVIDd: 5.3 cm  LVIDs: 3.2 cm  LVPWd: 0.70 cm  FS: 39.6 %     LV mass(C)d: 127.0 grams  LV mass(C)dI: 78.6 grams/m2  EF Biplane: 54.7 %  RWT: 0.26     Doppler Measurements & Calculations  TR max nadia: 209.0 cm/sec  TR max P.5 mmHg     ______________________________________________________________________________  Report approved by: Chencho George MD on 2025 12:47 PM

## 2025-03-30 NOTE — PLAN OF CARE
Goal Outcome Evaluation:      Plan of Care Reviewed With: patient    Goal Outcome Evaluation:  PRIMARY Concern: admitted for chest pain. ANGEL, hyponatremia.  SAFETY RISK Concerns (fall risk, behaviors, etc.): Fall risk      Aggression Tool Color: Green  Isolation/Type: N/A  Tests/Procedures for NEXT shift: US Abdomen resulted normal. UA-neg. Sodium check AM.  Consults? (Pending/following, signed-off?) Cardiology signed off. PT rec home with outpatient PT, home with outpatient cardiac rehab.  Where is patient from? (Home, TCU, etc.): Home with spouse.  Other Important info for NEXT shift: Denies dizzy/lightheaded. Pt refused bed alarm. Pt requesting uninterrupted sleep and not to wake up at night.   Anticipated DC date & active delays: 3/30      SUMMARY NOTE:   Orientation/Cognitive: A&Ox4.   Observation Goals (Met/ Not Met): INPT  Mobility Level/Assist Equipment: SBA refused walker   Antibiotics & Plan (IV/po, length of tx left): N/A  Pain Management: Denies any pain.  PRN Tylenol available  Complete Pain Reassessment: Y  Due next: shift   Tele/VS/O2: VSS on RA. Ortho BP neg this shift  ABNL Lab/BG: Na- improving, 127, 131, TSH- 4.55, Trop- 26, 24.  Diet: Reg, Fluid Restriction 2 Liters   Bowel/Bladder: Cont B/B. Voiding adequately in bathroom.  Skin Concerns: Dry skin   Drains/Devices: PIV SL  Patient Stated Goal for Today: sleep/rest

## 2025-03-30 NOTE — PLAN OF CARE
3/29/30  [id number]    Orientation: A/Ox4    Vitals/Tele: VSS, RA    IV Access/drains: PIV SL     Diet: Reg, Fluid Restriction 2000 ml    Mobility: SBA    GI/: Continent     Wound/Skin: WNL    Consults: N/A    Discharge Plan: Today 3/30 home w/spouse    Other: Pt refuses bed alarm. Educated on calling for assistance when ambulating in room.       See Flow sheets for assessment

## 2025-03-30 NOTE — PHARMACY - DISCHARGE MEDICATION RECONCILIATION
Discharge Summary  Hospitalist    Date of Admission:  3/27/2025  Date of Discharge:  3/30/2025  Discharging Provider: Ascencion Godfrey MD    Primary Care Physician   Nadira Rob  Primary Care Provider Phone Number: [phone]  Primary Care Provider Fax Number: [phone]    PRINCIPAL DIAGNOSIS  Chest pain recurrent - improved   Physical deconditioning from medical illness, senile frailty.  Acute on chronic dizziness improving.  Headache improved.  Acute kidney injury - improved   Acute on chronic hyponatremia  Hypochloremia -improved.  Pulmonary nodules.  Mild to moderate perihilar reticular opacities.  Nausea improved.    Past Medical History:   Diagnosis Date    Asthma     GERD (gastroesophageal reflux disease)     Heart failure with reduced ejection fraction (H)     Paroxysmal atrial fibrillation (H)     Stress-induced cardiomyopathy        History of Present Illness   Margareth Hogue is an 79 year old female who presented with chest pain.     Hospital Course     Margareth Hogue is an 79 year old female with a history of recent acute heart failure with moderately reduced ejection fraction secondary to recently diagnosed stress-induced cardiomyopathy, hypertension, hyperlipidemia, paroxysmal atrial fibrillation, CVA, asthma, sarcoidosis, and GERD admitted on 3/27/2025 for chest pain      Admitted 3/6 -3/8 following a fall.  Concern for NSTEMI during that admission, Echocardiogram 3/7 with LVEF of 35 to 40%.  Moderate to severe anterior, septal and apical wall hypokinesis. Coronary angiogram which showed largely normal coronary arteries but also apical wall ballooning consistent with stress-induced cardiomyopathy.    Readmitted 3/12 - 3/13 due to CHF, discharged home with addition of lasix 20 mg daily.     Chest pain recurrent - improved   Heart failure with reduced ejection fraction - improved.  Now EF to 50 to 55%  Recent diagnosis of stress-induced cardiomyopathy [EF 35 -40%].  Paroxysmal atrial  fibrillation on anticoagulation.  Hypertension. Hyperlipidemia  Report developed chest pain, nausea and lightheadedness on the morning of presentation while using a stationary bike at cardiac rehab.  No shortness of breath.  No fever or chills.  Symptoms improved but did not resolve, she was able to go home from cardiac rehab.  She called the clinic and was directed into the ED for evaluation.  Symptoms resolved by the time she arrived in the ED.    -Troponin of 24 >22; EKG with some new T wave changes in the anterolateral leads.    ProBNP 885, down from 4,754 two weeks ago.  --Patient was admitted to the observation unit, prior to admission Eliquis continued.  Echo 3/28 with LVEF of 50 to 55%.  Mild apical wall hypokinesis.  No significant valvular heart disease.    --On 3/29 -RRT for chest pain. (R sided chest pain radiating into her shoulder while working with PT. ) received sublingual nitro without any improvement in chest pain.  EKG sinus rhythm with new T wave inversions.  Relief of pain with nitro.  Chest pain resolved after 1 to 2 hours.  Troponin repeat 26 > 24.     Cardiology followed 3/29, recommend to continue current management.    Continue PTA Eliquis.  Continue PTA Toprol XL with hold parameters.  Continue PTA valsartan with hold parameters.  Discontinued PTA furosemide, started on torsemide 3/30 with hold parameters.  Continue torsemide on discharge.  Monitor electrolytes, renal function in 5 to 7 days  Monitor daily weights, blood pressure, heart rate, review on provider visit and optimize therapy.  Cardiac rehabilitation as outpatient.  Has cardiology follow-up appointment with ANTONY on 4/4.  Follow-up with PCP within 7 days.  Recheck BMP in 5 days.  [Patient follows with [facility], we can discharge patient reports will follow-up for her appointment]    Physical deconditioning from medical illness, senile frailty.  Acute on chronic dizziness improving.  Headache improved.  Report of  headache, nausea, dizziness on admission.  Lives at home with her .  No recent falls.  CT head without acute intracranial abnormality.  Continue PTA as needed meclizine.  Compression stockings.  Fall precautions.  Rehab team followed, outpatient rehabilitation  If dizziness persist consider ENT evaluation as outpatient.    Acute kidney injury - improved   Creatinine 0.99, up from baseline of ~ 0.6-0.7.  Ultrasound abdomen no acute findings  UA nitrite negative, leukocyte esterase negative.  No blood.  BMP in 5 days or earlier if symptomatic.     Acute on chronic hyponatremia  Hypochloremia -improved.  Presented with sodium of 129, down from low 130s earlier this month.  Baseline sodium in 130s.  Sodium dropped to 127 with IV fluids, now improved to 130.  TSH 4.55, T4 within normal limits.  Urine sodium 23.  Liberalize salt in diet.  2000 mL fluid restriction.  Monitor BMP in 5 to 7 days while on torsemide.    Pulmonary nodules.  Mild to moderate perihilar reticular opacities.  History of asthma not in exacerbation.  History of sarcoidosis  No shortness of breath.  No hypoxia.  Afebrile.  No leukocytosis  Chest x-ray with Mild to moderate perihilar reticular opacities are seen which may suggest atypical pneumonia.  Known pulmonary nodules are better seen on the  prior CT chest exam from 3/6/2025. 9 mm nodular density in the left lower lung was not definitively seen on the prior exam. Multiple calcified granulomas are noted in the  mediastinum. Normal cardiomediastinal silhouette. Multilevel degenerative changes are seen in the spine with scoliotic curvature.  Continue PTA inhaler therapy.  Recommend close follow-up chest imaging with CT in 12 weeks or earlier if symptomatic. Can consider pulmonology evaluation as outpatient.    Nausea improved.  No report of abdominal pain, diarrhea, lipase 33.  Liver enzymes within normal limits.  Ultrasound abdomen no acute pathology.  Tolerating oral diet.     History of  CVA  Continue PTA Eliquis, Lipitor.    GERD  Continue PTA omeprazole     Ascencion Godfrey MD.    Pending Results   None          Physical Exam   Vitals:    03/27/25 1334   Weight: 59.4 kg (131 lb)     Vital Signs with Ranges  Temp:  [98.1  F (36.7  C)-98.9  F (37.2  C)] 98.9  F (37.2  C)  Pulse:  [68-73] 70  Resp:  [18] 18  BP: (114-135)/(50-71) 135/71  SpO2:  [96 %-98 %] 98 %  I/O last 3 completed shifts:  In: 810 [P.O.:810]  Out: 800 [Urine:800]  PHYSICAL EXAM  GENERAL: Patient is in no distress. Alert and oriented.  HEENT: Oropharynx pink, no nystagmus.  HEART: Regular rate and rhythm. S1S2. No murmurs  LUNGS: Respirations unlabored  NEURO: Moving all extremities, no focal weakness.  EXTREMITIES: No pedal edema.   SKIN: Warm, dry. No rash   PSYCHIATRY Cooperative  )Consultations This Hospital Stay   CARDIOLOGY IP CONSULT  CARE MANAGEMENT / SOCIAL WORK IP CONSULT  PHYSICAL THERAPY ADULT IP CONSULT  CARDIOLOGY IP CONSULT    Time Spent on this Encounter   I, Ascencion Godfrey MD, personally saw the patient today and spent greater than 30 minutes discharging this patient.. Discussed with patient, bedside RN.    Discharge Disposition   Discharged to home.  Condition at discharge: Fair.    Discharge Orders      Reason for your hospital stay    You were admitted to the hospital with chest pain, followed by hospitalist team, cardiology.  Some improvement in symptoms.  Echocardiogram shows improvement in heart function.  Prior to admission Lasix switched to torsemide.  Plan for discharge home with close follow-up.     Activity    Your activity upon discharge: activity as tolerated     Discharge Instructions    Fall precautions.  Can consider compression stockings if ongoing dizziness.  Gradually move from lying to sitting position prior to up and moving around.     Monitor and record    Monitor daily blood pressure, weight, heart rate review on provider visit and optimize therapy.     Follow Up    Follow-up with PCP  within 7 days of discharge earlier if symptomatic.  Follow-up with cardiology in clinic per schedule.  Consider ENT evaluation if ongoing dizziness.  Rehabilitation as outpatient.    Noted lung nodules on CT during previous admission.  Recommend close follow-up chest imaging with CT in 12 weeks or earlier if symptomatic. Can consider pulmonology evaluation as outpatient.     Diet    Follow this diet upon discharge: Current Diet:Orders Placed This Encounter      Fluid restriction 2000 ML FLUID      Combination Diet Regular Diet Adult     Hospital Follow-up with Existing Primary Care Provider (PCP)    Please see details below            Discharge Medications   Current Discharge Medication List        START taking these medications    Details   nitroGLYcerin (NITROSTAT) 0.4 MG sublingual tablet For chest pain place 1 tablet under the tongue every 5 minutes for 3 doses. If symptoms persist 5 minutes after 1st dose call 911.  Maximum 3 doses in 15 minutes. Notify provider if no relief after 3 doses. Do NOT give nitroGLYcerin SL if the patient has received sildenafil (VIAGRA/REVATIO), avanafil (STENDRA) or vardenafil (LEVITRA/STAXYN) within the last 24 hours, OR tadalafil (CIALIS/ADCIRCA) within the last 48 hours.  Inform provider if patient has taken one of those medications.  Qty: 5 tablet, Refills: 0    Associated Diagnoses: Chest pain, unspecified type      torsemide (DEMADEX) 10 MG tablet Take 1 tablet (10 mg) by mouth daily. Hold for systolic blood pressure less than 100.  Qty: 30 tablet, Refills: 0    Associated Diagnoses: Benign essential hypertension           CONTINUE these medications which have CHANGED    Details   valsartan (DIOVAN) 40 MG tablet Take 1 tablet (40 mg) by mouth every evening. Hold if systolic blood pressure less than 100.  Qty: 30 tablet, Refills: 0    Associated Diagnoses: Heart failure with reduced ejection fraction, NYHA class I (H)           CONTINUE these medications which have NOT CHANGED     Details   albuterol (PROAIR HFA/PROVENTIL HFA/VENTOLIN HFA) 108 (90 Base) MCG/ACT inhaler Inhale 2 puffs into the lungs at bedtime. And Q6H as needed      apixaban ANTICOAGULANT (ELIQUIS) 2.5 MG tablet Take 2.5 mg by mouth 2 times daily.      atorvastatin (LIPITOR) 10 MG tablet Take 1 tablet (10 mg) by mouth daily.  Qty: 90 tablet, Refills: 3    Associated Diagnoses: Hyperlipidemia LDL goal <130      budesonide (PULMICORT) 0.5 MG/2ML neb solution Take 0.5 mg by nebulization daily as needed (short of breath, wheezing).      calcium carbonate (OS-SHINE) 1500 (600 Ca) MG tablet Take 600 mg by mouth daily.      cetirizine (ZYRTEC) 10 MG tablet Take 10 mg by mouth every evening.      conjugated estrogens (PREMARIN) 0.625 MG/GM vaginal cream Place vaginally daily as needed.      docusate sodium (DSS) 100 MG capsule Take 1 capsule by mouth every evening.      fish oil-omega-3 fatty acids 1000 MG capsule Take 1 g by mouth 2 times daily.      fluticasone (FLONASE) 50 MCG/ACT nasal spray Spray 1 spray into both nostrils 2 times daily.      ipratropium (ATROVENT) 0.02 % neb solution Inhale 0.5 mg into the lungs every 6 hours as needed for wheezing.      ketoconazole (NIZORAL) 2 % external cream Apply topically daily as needed for itching or irritation. To feet      meclizine (ANTIVERT) 12.5 MG tablet Take 12.5 mg by mouth 3 times daily as needed for dizziness.      metoprolol succinate ER (TOPROL XL) 25 MG 24 hr tablet Take 1 tablet (25 mg) by mouth daily.  Qty: 30 tablet, Refills: 2    Associated Diagnoses: Takotsubo cardiomyopathy      omeprazole (PRILOSEC) 20 MG DR capsule Take 20 mg by mouth daily as needed.      polyethylene glycol (MIRALAX) 17 g packet Take 1 packet by mouth daily as needed for constipation.      polyethylene glycol-propylene glycol (SYSTANE ULTRA) 0.4-0.3 % SOLN ophthalmic solution Place 1-2 drops into both eyes 4 times daily as needed for dry eyes.      sodium chloride (OCEAN) 0.65 % nasal spray Spray  1 spray into both nostrils 2 times daily.      TRELEGY ELLIPTA 100-62.5-25 MCG/ACT oral inhaler Inhale 2 puffs into the lungs daily           STOP taking these medications       furosemide (LASIX) 20 MG tablet Comments:   Reason for Stopping:             Allergies   Allergies   Allergen Reactions    Sulfa Antibiotics Unknown     Long time ago, she cannot remember    Diazepam Other (See Comments)     Makes more anxious    Meperidine Nausea and Vomiting and Unknown    Morphine Nausea and Unknown    Penicillins Itching    Zafirlukast      She does not remember       DATA  Most Recent 3 CBC's:  Recent Labs   Lab Test 03/27/25  1459 03/13/25  0724 03/12/25  1325   WBC 6.8 6.0 6.0   HGB 12.2 11.0* 11.1*   MCV 84 86 87    358 291      Most Recent 3 BMP's:  Recent Labs   Lab Test 03/30/25  0954 03/29/25  0951 03/29/25  0911 03/28/25  1641 03/28/25  1203 03/28/25  0830   * 131*  --  127*  --  127*   POTASSIUM  --  4.3  --  4.4  --  4.2   CHLORIDE  --  100  --  97*  --  95*   CO2  --  22  --  23  --  23   BUN  --  15.6  --  18.5  --  18.6   CR  --  0.59  --  0.60  --  0.63   ANIONGAP  --  9  --  7  --  9   SHINE  --  8.7*  --  8.8  --  8.8   GLC  --  93 102* 97   < > 110*    < > = values in this interval not displayed.     Most Recent 2 LFT's:  Recent Labs   Lab Test 03/27/25  1459 03/05/25  2323   AST 28 23   ALT 18 20   ALKPHOS 78 90   BILITOTAL 0.3 0.5     Most Recent Cholesterol Panel:  Recent Labs   Lab Test 03/06/25  1043   CHOL 175   LDL 88   HDL 78   TRIG 47     Most Recent TSH, T4 and A1c Labs:  Recent Labs   Lab Test 03/28/25  0830 12/30/24  1427   TSH 4.55*  --    T4 1.13  --    A1C  --  5.6     Results for orders placed or performed during the hospital encounter of 03/27/25   Chest XR,  PA & LAT    Narrative    EXAM: XR CHEST 2 VIEWS  LOCATION: [facility]  DATE: 3/27/2025    INDICATION: chest pain  COMPARISON: 3/12/2025      Impression    IMPRESSION: 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 Mild to moderate perihilar reticular opacities are seen which may suggest atypical pneumonia. Consider further characterization with CT chest exam. Known pulmonary nodules are better seen on the   prior CT chest exam from 3/6/2025. 9 mm nodular density in the left lower lung was not definitively seen on the prior exam. This nodule can be further characterized on the CT exam suggested above. Multiple calcified granulomas are noted in the   mediastinum. Normal cardiomediastinal silhouette. Multilevel degenerative changes are seen in the spine with scoliotic curvature.   Head CT w/o contrast    Narrative    EXAM: CT HEAD W/O CONTRAST  LOCATION: [facility]  DATE/TIME: 3/27/2025 3:42 PM CDT    INDICATION: Headache and nausea  COMPARISON: CT head dated 3/5/2025  TECHNIQUE: Routine CT Head without IV contrast. Multiplanar reformats. Dose reduction techniques were used.    FINDINGS:   INTRACRANIAL CONTENTS: No acute intracranial hemorrhage. No CT evidence of acute infarct. Sequelae of mild chronic microangiopathy. Mild cerebral volume loss without hydrocephalus. No extra-axial fluid collections.  Patent basal cisterns.     VISUALIZED ORBITS/SINUSES/MASTOIDS: Postoperative changes of bilateral lenses, otherwise the orbits are unremarkable. The visualized paranasal sinuses and temporal bone structures are well-aerated.     BONES/SOFT TISSUES: Probable right frontal bone hemangioma, otherwise the calvarium and skull base are unremarkable.       Impression    IMPRESSION:     1. Senescent changes and sequelae of chronic microangiopathy without acute intracranial abnormality.   US Abdomen Limited    Narrative    EXAM: US ABDOMEN LIMITED  LOCATION: [facility]  DATE: 3/28/2025    INDICATION: Abdominal swelling  COMPARISON: None.  TECHNIQUE: Limited abdominal ultrasound.    FINDINGS:    GALLBLADDER: Normal. No gallstones, wall thickening, or  pericholecystic fluid. Negative sonographic Morales's sign.    BILE DUCTS: No biliary dilatation. The common duct measures 7 mm.    LIVER: Normal parenchyma with smooth contour. No focal mass. The portal vein is patent with flow in the normal direction.    RIGHT KIDNEY: No hydronephrosis.    PANCREAS: The visualized portions are normal.    No ascites.      Impression    IMPRESSION:  1.  Normal limited abdominal ultrasound.       Echocardiogram Limited     Value    LVEF  50-55%    [facility]    [id number]  [id number]  [id number]  [id number]^KAMAR^FAUZIA^WILTON     [facility]  Echocardiography Laboratory  [address] [place] [address]     Name: SAMEER KIRKPATRICK  MRN: [id number]  : 1945  Study Date: 2025 11:09 AM  Age: 79 yrs  Gender: Female  Patient Location: [facility]  Reason For Study: CHF  Ordering Physician: FAUZIA GALVIN  Performed By: COREEN Hernandez     BSA: 1.6 m2  Height: 63 in  Weight: 131 lb  HR: 53  BP: 125/75 mmHg  ______________________________________________________________________________  Procedure  Definity (NDC #59123-870) given intravenously. Limited Echocardiogram with  two-dimensional, color and spectral Doppler. Contrast Definity. Technically  difficult study.  ______________________________________________________________________________  Interpretation Summary     The visual ejection fraction is 50-55%.  There is mild apical wall hypokinesis.  No significant valvular heart disease.  ______________________________________________________________________________  Left Ventricle  The left ventricle is normal in size. There is normal left ventricular wall  thickness. The visual ejection fraction is 50-55%. There is mild apical wall  hypokinesis.     Right Ventricle  The right ventricle is normal in size and function.     Atria  Normal left atrial size. Right atrial size is normal.     Mitral Valve  The mitral valve leaflets are mildly thickened. There  is trace to mild mitral  regurgitation.     Tricuspid Valve  There is trace to mild tricuspid regurgitation. IVC diameter and respiratory  changes fall into an intermediate range suggesting an RA pressure of 8 mmHg.  The right ventricular systolic pressure is approximated at 17.5 mmHg plus the  right atrial pressure.     Aortic Valve  There is trivial trileaflet aortic sclerosis. No aortic regurgitation is  present.     Pulmonic Valve  There is trace pulmonic valvular regurgitation.     Pericardium  There is no pericardial effusion.     Rhythm  Sinus rhythm was noted.     ______________________________________________________________________________  MMode/2D Measurements & Calculations  IVSd: 0.70 cm  LVIDd: 5.3 cm  LVIDs: 3.2 cm  LVPWd: 0.70 cm  FS: 39.6 %     LV mass(C)d: 127.0 grams  LV mass(C)dI: 78.6 grams/m2  EF Biplane: 54.7 %  RWT: 0.26     Doppler Measurements & Calculations  TR max nadia: 209.0 cm/sec  TR max P.5 mmHg     ______________________________________________________________________________  Report approved by: Chencho George MD on 2025 12:47 PM

## 2025-03-30 NOTE — PLAN OF CARE
Goal Outcome Evaluation:  Pt is discharging home at this time w/ all pt's belongings. AVS, Meds and education given. Questions answered. Pt's  will transport.       Plan of Care Reviewed With: patient

## 2025-03-31 ENCOUNTER — PATIENT OUTREACH (OUTPATIENT)
Dept: CARE COORDINATION | Facility: CLINIC | Age: 80
End: 2025-03-31
Payer: COMMERCIAL

## 2025-03-31 ENCOUNTER — HOSPITAL ENCOUNTER (OUTPATIENT)
Dept: CARDIAC REHAB | Facility: CLINIC | Age: 80
Discharge: HOME | End: 2025-03-31
Attending: INTERNAL MEDICINE
Payer: COMMERCIAL

## 2025-03-31 PROCEDURE — 93798 PHYS/QHP OP CAR RHAB W/ECG: CPT

## 2025-03-31 NOTE — PROGRESS NOTES
[facility]: Transitions of Care Outreach  Chief Complaint   Patient presents with    Clinic Care Coordination - Post Hospital       Most Recent Admission Date: 3/27/2025   Most Recent Admission Diagnosis: Nonspecific abnormal electrocardiogram (ECG) (EKG) - R94.31  Elevated serum creatinine - R79.89  Headache - R51.9     Most Recent Discharge Date: 3/30/2025   Most Recent Discharge Diagnosis: Nonspecific abnormal electrocardiogram (ECG) (EKG) - R94.31  Elevated serum creatinine - R79.89  Headache - R51.9  Benign essential hypertension - I10  Heart failure with reduced ejection fraction, NYHA class I (H) - I50.20  Chest pain, unspecified type - R07.9     Transitions of Care Assessment    Discharge Assessment  How are you doing now that you are home?: Writer spoke to patient who reports that things are good, no questions or concerns at this time  How are your symptoms? (Red Flag symptoms escalate to triage hotline per guidelines): Improved  Do you know how to contact your clinic care team if you have future questions or changes to your health status? : Yes  Does the patient have their discharge instructions? : Yes  Does the patient have questions regarding their discharge instructions? : No  Were you started on any new medications or were there changes to any of your previous medications? : Yes  Does the patient have all of their medications?: Yes  Do you have questions regarding any of your medications? : No  Do you have all of your needed medical supplies or equipment (DME)?  (i.e. oxygen tank, CPAP, cane, etc.): Yes                  Follow up Plan     Follow-up with PCP within 7 days of discharge earlier if symptomatic.  Follow-up with cardiology in clinic per schedule.  Consider ENT evaluation if ongoing dizziness.  Rehabilitation as outpatient.     Noted lung nodules on CT during previous admission.  Recommend close follow-up chest imaging with CT in 12 weeks or earlier if symptomatic. Can  consider pulmonology evaluation as outpatient.     Discharge Follow-Up  Discharge follow up appointment scheduled in alignment with recommended follow up timeframe or Transitions of Risk Category? (Low = within 30 days; Moderate= within 14 days; High= within 7 days): Yes  Discharge Follow Up Appointment Date: 03/31/25    Future Appointments   Date Time Provider Department Center   4/2/2025  8:00 AM 1, [facility]   4/4/2025  8:00 AM 1, [facility]   4/4/2025 12:40 PM Mckenna Yuen NP [facility]   4/7/2025  6:30 AM 1, Sh Card[facility]   4/9/2025  8:00 AM 1, [facility]   4/11/2025  8:00 AM 1, [facility]   4/14/2025  8:00 AM 1, [facility]   4/16/2025  8:00 AM 1, Sh Card[facility]   4/18/2025  8:00 AM 1, Sh Cardiac Rehab [facility]   4/21/2025  8:00 AM [facility]ARD[facility] RE[facility] 4 [facility]   4/21/2025  9:00 AM 2, [facility] Card[facility]   4/21/2025 12:45 PM [facility]   4/23/2025  8:00 AM 1, Sh Cardiac Rehab [facility]   4/25/2025  8:00 AM 1, [facility] Card[facility]   4/28/2025  8:00 AM 1, [facility] Cardiac Rehab [facility]   4/30/2025  8:00 AM 1, [facility] Card[facility]   5/2/2025  8:00 AM 1, [facility] Card[facility]   5/5/2025  8:00 AM 1, [facility] Card[facility]hab [facility]   5/7/2025  8:00 AM 1, [facility] Card[facility]   5/9/2025  8:00 AM 1, [facility]   5/12/2025  8:00 AM 1, [facility]       Outpatient Plan as outlined on AVS reviewed with patient.    For any urgent concerns, please contact our 24 hour nurse triage line: [phone] ([phone])     NARCISO Beach (she/her/hers)  Social Work Clinic Care Coordinator   [facility]  [email or web address]  [phone]

## 2025-04-01 ENCOUNTER — TELEPHONE (OUTPATIENT)
Dept: CARDIOLOGY | Facility: CLINIC | Age: 80
End: 2025-04-01
Payer: COMMERCIAL

## 2025-04-01 NOTE — TELEPHONE ENCOUNTER
"Patient was admitted to [facility] 3/27/25 for chest pain.    \"Admitted 3/6 -3/8 following a fall.  Concern for NSTEMI during that admission, Echocardiogram 3/7 with LVEF of 35 to 40%.  Moderate to severe anterior, septal and apical wall hypokinesis. Coronary angiogram which showed largely normal coronary arteries but also apical wall ballooning consistent with stress-induced cardiomyopathy.    Readmitted 3/12 - 3/13 due to CHF, discharged home with addition of lasix 20 mg daily.\"     PMH: recent acute heart failure with moderately reduced ejection fraction secondary to recently diagnosed stress-induced cardiomyopathy, hypertension, hyperlipidemia, paroxysmal atrial fibrillation, CVA, asthma, sarcoidosis, and GERD.     3/28/25: Echo showed EF of 50 to 55%. Mild apical wall hypokinesis. No significant valvular heart disease.     Per cardiology IP note, \"Her recurrent pain is unlikely to be cardiac and given her recent unremarkable coronary angiogram I would not recommend treating this as an acute coronary syndrome.\"    Pt was started on NTG and Demadex. PTA Diovan changed to PRN. Lasix was discontinued at time of discharge.    Pt is scheduled for an OV on 4/4/25 at 1230 with ANTONY Shelli Yuen at our [place] Office.    Writer attempted to call pt for a cardiology post discharge phone call, but no answer. VM left to call back with any non emergent cardiac related or medication questions.  Reminder left of appt as scheduled above. Writer's phone number was provided. SARAN Neumann RN.       "

## 2025-04-01 NOTE — TELEPHONE ENCOUNTER
[facility] Call Center    Phone Message    May a detailed message be left on voicemail: yes     Reason for Call: Other: Patient called back. No available appt with ANTONY pod with Dr. Louis and no available appt with Dr. Louis. Called priority line no answer. Please reach out. Thank you       Action Taken: Other: cardiology     Travel Screening: Not Applicable    Thank you!  Specialty Access Center       Date of Service:

## 2025-04-01 NOTE — TELEPHONE ENCOUNTER
1st attempt- Left voicemail for the patient to call back and schedule the following:    Appointment type:  Return Cardiology  Provider:  Shelli Yuen  Return date:  routine  Additional appointment(s) needed:    Additional Notes:  pt's appt with Shelli on 4/4 was cancelled due to provider orphan- Shelli would like pt to see Dr Pacheco in April- if nothing avail ok to see another ANOTNY- can use New or urgent slots for Dr Louis  Specialty phone number: [phone]

## 2025-04-02 ENCOUNTER — HOSPITAL ENCOUNTER (OUTPATIENT)
Dept: CARDIAC REHAB | Facility: CLINIC | Age: 80
Discharge: HOME | End: 2025-04-02
Attending: INTERNAL MEDICINE
Payer: COMMERCIAL

## 2025-04-02 PROCEDURE — 93798 PHYS/QHP OP CAR RHAB W/ECG: CPT | Performed by: REHABILITATION PRACTITIONER

## 2025-04-03 ENCOUNTER — OFFICE VISIT (OUTPATIENT)
Dept: CARDIOLOGY | Facility: CLINIC | Age: 80
End: 2025-04-03
Payer: COMMERCIAL

## 2025-04-03 VITALS
HEIGHT: 64 IN | DIASTOLIC BLOOD PRESSURE: 72 MMHG | WEIGHT: 131.3 LBS | HEART RATE: 68 BPM | SYSTOLIC BLOOD PRESSURE: 110 MMHG | OXYGEN SATURATION: 97 % | BODY MASS INDEX: 22.42 KG/M2

## 2025-04-03 DIAGNOSIS — I48.92 ATRIAL FLUTTER WITH RAPID VENTRICULAR RESPONSE (H): ICD-10-CM

## 2025-04-03 DIAGNOSIS — I50.20 HEART FAILURE WITH REDUCED EJECTION FRACTION, NYHA CLASS I (H): ICD-10-CM

## 2025-04-03 DIAGNOSIS — I21.4 NSTEMI (NON-ST ELEVATED MYOCARDIAL INFARCTION) (H): ICD-10-CM

## 2025-04-03 RX ORDER — ISOSORBIDE MONONITRATE 30 MG/1
30 TABLET, EXTENDED RELEASE ORAL DAILY
Qty: 90 TABLET | Refills: 0 | Status: SHIPPED | OUTPATIENT
Start: 2025-04-03

## 2025-04-03 RX ORDER — VALSARTAN 40 MG/1
20 TABLET ORAL EVERY EVENING
Status: SHIPPED
Start: 2025-04-03

## 2025-04-03 NOTE — LETTER
4/3/2025    Nadira oRb MD  [address], [address]  [place] [address]    RE: Margareth Hogue       Dear Colleague,     I had the pleasure of seeing Margareth Hogue in the [facility].      CARDIOLOGY CLINIC CONSULTATION    PRIMARY CARE PHYSICIAN:  Nadira Rob    Tests reviewed/interpreted independently in clinic today:   EKG, Echocardiogram, Blood work.     The level of medical decision making during this visit was of moderate complexity.     The longitudinal plan of care for the diagnosis(es)/condition(s) as documented were addressed during this visit. Due to the added complexity in care, I will continue to support Margareth in the subsequent management and with ongoing continuity of care.     HISTORY OF PRESENT ILLNESS:  Today, I had the pleasure of connecting with Margareth Hogue.  She is a very pleasant 79-year-old lady with past medical history of atrial fibrillation status post PVI many years ago, atrial flutter/tachycardia,  Dyslipidemia, right nephrectomy for renal cell carcinoma, pulmonary sarcoidosis, GERD, TIA who presents to the clinic for follow-up visit.  I had last seen her in 2023.  The patient was recently admitted to the hospital for chest pain.  EF was 30 to 35%, coronary angiogram was negative.  Diagnosis of stress-induced cardiomyopathy was made.  She was started on appropriate medications and discharged home.  The patient subsequently continues to have episodes of chest pain for which she requires sublingual nitroglycerin with good response.  She has continued to work with physical therapy and is undergoing rehabilitation.  She had an episode of SVT/A. tach yesterday which lasted for 1 minute.  She noticed fluttering in the heart at that time but did not become dizzy or lightheaded.  She has not been driving because of an episode of syncope that occurred prior to her hospital presentation.  Has not had any such episode since.         Assessment and Impression:       Pertinent issues addressed/ reviewed during this cardiology visit    Non-ischemic cardiomyopathy with ejection fraction of 35%- likely Takotsubo cardiomyopathy.  No major coronary artery disease on angiogram.  On metoprolol and losartan and torsemide.  No edema.  Change torsemide to on as-needed basis.      History of atrial fibrillation s/p PVI/atrial flutter-on anticoagulation and tolerating it well.  Will continue.  Reports fatigue which could be secondary to metoprolol versus #1.  Will monitor.    Pulmonary sarcoidosis-stable    Essential hypertension-cutting back on losartan to add Imdur for chest pain.  Also asked her to take torsemide on as-needed basis as heart failure symptoms and edema have completely have resolved.    Near syncopal episode??  Episode was in the setting of new heart failure in early March.  Had another episode a week prior to the first episode where she slipped and fell but was told not to drive.  Unclear if there was any arrhythmia during that time.  However has been stable since.  She can start driving in a month if she is otherwise feeling well.    I will reach out to her with the results of the echocardiogram.  I will have her come back and see us in 3 months. The longitudinal plan of care for the diagnosis(es)/condition(s) as documented were addressed during this visit. Due to the added complexity in care, I will continue to support Margareth in the subsequent management and with ongoing continuity of care.      Melo WARD, FACC, FASE  Cardiology - [facility]  April 3, 2025    Orders Placed This Encounter   Procedures     Follow-Up with Cardiology ANTONY     Echocardiogram Limited       PAST MEDICAL HISTORY:  Past Medical History:   Diagnosis Date     Asthma      GERD (gastroesophageal reflux disease)      Heart failure with reduced ejection fraction (H)      Paroxysmal atrial fibrillation (H)      Stress-induced cardiomyopathy        MEDICATIONS:  Current Outpatient Medications    Medication Sig Dispense Refill     albuterol (PROAIR HFA/PROVENTIL HFA/VENTOLIN HFA) 108 (90 Base) MCG/ACT inhaler Inhale 2 puffs into the lungs at bedtime. And Q6H as needed       apixaban ANTICOAGULANT (ELIQUIS) 2.5 MG tablet Take 2.5 mg by mouth 2 times daily.       atorvastatin (LIPITOR) 10 MG tablet Take 1 tablet (10 mg) by mouth daily. 90 tablet 3     budesonide (PULMICORT) 0.5 MG/2ML neb solution Take 0.5 mg by nebulization daily as needed (short of breath, wheezing).       calcium carbonate (OS-SHINE) 1500 (600 Ca) MG tablet Take 600 mg by mouth 2 times daily.       cetirizine (ZYRTEC) 10 MG tablet Take 10 mg by mouth every evening.       conjugated estrogens (PREMARIN) 0.625 MG/GM vaginal cream Place vaginally daily as needed.       docusate sodium (DSS) 100 MG capsule Take 1 capsule by mouth every evening.       fish oil-omega-3 fatty acids 1000 MG capsule Take 1 g by mouth 2 times daily.       fluticasone (FLONASE) 50 MCG/ACT nasal spray Spray 1 spray into both nostrils 2 times daily.       ipratropium (ATROVENT) 0.02 % neb solution Inhale 0.5 mg into the lungs every 6 hours as needed for wheezing.       isosorbide mononitrate (IMDUR) 30 MG 24 hr tablet Take 1 tablet (30 mg) by mouth daily. 90 tablet 0     ketoconazole (NIZORAL) 2 % external cream Apply topically daily as needed for itching or irritation. To feet       meclizine (ANTIVERT) 12.5 MG tablet Take 12.5 mg by mouth 3 times daily as needed for dizziness.       metoprolol succinate ER (TOPROL XL) 25 MG 24 hr tablet Take 1 tablet (25 mg) by mouth daily. 30 tablet 2     nitroGLYcerin (NITROSTAT) 0.4 MG sublingual tablet For chest pain place 1 tablet under the tongue every 5 minutes for 3 doses. If symptoms persist 5 minutes after 1st dose call 911.  Maximum 3 doses in 15 minutes. Notify provider if no relief after 3 doses. Do NOT give nitroGLYcerin SL if the patient has received sildenafil (VIAGRA/REVATIO), avanafil (STENDRA) or vardenafil  (LEVITRA/STAXYN) within the last 24 hours, OR tadalafil (CIALIS/ADCIRCA) within the last 48 hours.  Inform provider if patient has taken one of those medications. 5 tablet 0     omeprazole (PRILOSEC) 20 MG DR capsule Take 20 mg by mouth daily as needed.       polyethylene glycol (MIRALAX) 17 g packet Take 1 packet by mouth daily as needed for constipation.       polyethylene glycol-propylene glycol (SYSTANE ULTRA) 0.4-0.3 % SOLN ophthalmic solution Place 1-2 drops into both eyes 4 times daily as needed for dry eyes.       sodium chloride (OCEAN) 0.65 % nasal spray Spray 1 spray into both nostrils 2 times daily.       torsemide (DEMADEX) 10 MG tablet Take 1 tablet (10 mg) by mouth daily. Hold for systolic blood pressure less than 100. 30 tablet 0     TRELEGY ELLIPTA 100-62.5-25 MCG/ACT oral inhaler Inhale 2 puffs into the lungs daily       valsartan (DIOVAN) 40 MG tablet Take 0.5 tablets (20 mg) by mouth every evening. Hold if systolic blood pressure less than 100.       No current facility-administered medications for this visit.       ALLERGIES:  Allergies   Allergen Reactions     Sulfa Antibiotics Unknown     Long time ago, she cannot remember     Diazepam Other (See Comments)     Makes more anxious     Meperidine Nausea and Vomiting and Unknown     Morphine Nausea and Unknown     Penicillins Itching     Zafirlukast      She does not remember       SOCIAL HISTORY:  I have reviewed this patient's social history and updated it with pertinent information if needed. Margareth Hogue  reports that she has never smoked. She has never used smokeless tobacco. She reports that she does not currently use alcohol.    FAMILY HISTORY:  I have reviewed this patient's family history and updated it with pertinent information if needed.   Family History   Problem Relation Age of Onset     Heart Defect Mother      Myocardial Infarction Father        REVIEW OF SYSTEMS:  Skin:        Eyes:       ENT:       Respiratory:  Positive for  "shortness of breath, dyspnea on exertion  Cardiovascular:  palpitations, edema, syncope or near-syncope, exercise intolerance Positive for, dizziness, chest pain, lightheadedness, heaviness, fatigue  Gastroenterology:      Genitourinary:       Musculoskeletal:  Positive for    Neurologic:  not assessed headaches  Psychiatric:       Heme/Lymph/Imm:  Positive for allergies  Endocrine:  Negative        PHYSICAL EXAM:  /72 (BP Location: Right arm, Patient Position: Sitting)   Pulse 68   Ht 1.613 m (5' 3.5\")   Wt 59.6 kg (131 lb 4.8 oz)   LMP  (LMP Unknown)   SpO2 97%   BMI 22.89 kg/m    Constitutional: alert, no distress  Respiratory: Good bilateral air entry  Cardiovascular: s1 s2 normal, no murmurs  GI: nondistended  Neuropsychiatric: appropriate affact  Edema: none    This note was completed in part using dictation via the Dragon voice recognition software. Some word and grammatical errors may occur and must be interpreted in the appropriate clinical context.  If there are any questions pertaining to this issue, please contact me for further clarification.      Thank you for allowing me to participate in the care of your patient.      Sincerely,     Melo Louis MD     [facility]  cc:   Nadira Rob MD  [address] [address]      "

## 2025-04-03 NOTE — PROGRESS NOTES
CARDIOLOGY CLINIC CONSULTATION    PRIMARY CARE PHYSICIAN:  Nadira Rob    Tests reviewed/interpreted independently in clinic today:   EKG, Echocardiogram, Blood work.     The level of medical decision making during this visit was of moderate complexity.     The longitudinal plan of care for the diagnosis(es)/condition(s) as documented were addressed during this visit. Due to the added complexity in care, I will continue to support Margareth in the subsequent management and with ongoing continuity of care.     HISTORY OF PRESENT ILLNESS:  Today, I had the pleasure of connecting with Margareth Hogue.  She is a very pleasant 79-year-old lady with past medical history of atrial fibrillation status post PVI many years ago, atrial flutter/tachycardia,  Dyslipidemia, right nephrectomy for renal cell carcinoma, pulmonary sarcoidosis, GERD, TIA who presents to the clinic for follow-up visit.  I had last seen her in 2023.  The patient was recently admitted to the hospital for chest pain.  EF was 30 to 35%, coronary angiogram was negative.  Diagnosis of stress-induced cardiomyopathy was made.  She was started on appropriate medications and discharged home.  The patient subsequently continues to have episodes of chest pain for which she requires sublingual nitroglycerin with good response.  She has continued to work with physical therapy and is undergoing rehabilitation.  She had an episode of SVT/A. tach yesterday which lasted for 1 minute.  She noticed fluttering in the heart at that time but did not become dizzy or lightheaded.  She has not been driving because of an episode of syncope that occurred prior to her hospital presentation.  Has not had any such episode since.         Assessment and Impression:      Pertinent issues addressed/ reviewed during this cardiology visit    Non-ischemic cardiomyopathy with ejection fraction of 35%- likely Takotsubo cardiomyopathy.  No major coronary artery disease on angiogram.   On metoprolol and losartan and torsemide.  No edema.  Change torsemide to on as-needed basis.      History of atrial fibrillation s/p PVI/atrial flutter-on anticoagulation and tolerating it well.  Will continue.  Reports fatigue which could be secondary to metoprolol versus #1.  Will monitor.    Pulmonary sarcoidosis-stable    Essential hypertension-cutting back on losartan to add Imdur for chest pain.  Also asked her to take torsemide on as-needed basis as heart failure symptoms and edema have completely have resolved.    Near syncopal episode??  Episode was in the setting of new heart failure in early March.  Had another episode a week prior to the first episode where she slipped and fell but was told not to drive.  Unclear if there was any arrhythmia during that time.  However has been stable since.  She can start driving in a month if she is otherwise feeling well.    I will reach out to her with the results of the echocardiogram.  I will have her come back and see us in 3 months. The longitudinal plan of care for the diagnosis(es)/condition(s) as documented were addressed during this visit. Due to the added complexity in care, I will continue to support Margareth in the subsequent management and with ongoing continuity of care.      Melo WARD, FACC, BHARGAVI  Cardiology - [facility]  April 3, 2025    Orders Placed This Encounter   Procedures    Follow-Up with Cardiology ANTONY    Echocardiogram Limited       PAST MEDICAL HISTORY:  Past Medical History:   Diagnosis Date    Asthma     GERD (gastroesophageal reflux disease)     Heart failure with reduced ejection fraction (H)     Paroxysmal atrial fibrillation (H)     Stress-induced cardiomyopathy        MEDICATIONS:  Current Outpatient Medications   Medication Sig Dispense Refill    albuterol (PROAIR HFA/PROVENTIL HFA/VENTOLIN HFA) 108 (90 Base) MCG/ACT inhaler Inhale 2 puffs into the lungs at bedtime. And Q6H as needed      apixaban ANTICOAGULANT (ELIQUIS) 2.5 MG  tablet Take 2.5 mg by mouth 2 times daily.      atorvastatin (LIPITOR) 10 MG tablet Take 1 tablet (10 mg) by mouth daily. 90 tablet 3    budesonide (PULMICORT) 0.5 MG/2ML neb solution Take 0.5 mg by nebulization daily as needed (short of breath, wheezing).      calcium carbonate (OS-SHINE) 1500 (600 Ca) MG tablet Take 600 mg by mouth 2 times daily.      cetirizine (ZYRTEC) 10 MG tablet Take 10 mg by mouth every evening.      conjugated estrogens (PREMARIN) 0.625 MG/GM vaginal cream Place vaginally daily as needed.      docusate sodium (DSS) 100 MG capsule Take 1 capsule by mouth every evening.      fish oil-omega-3 fatty acids 1000 MG capsule Take 1 g by mouth 2 times daily.      fluticasone (FLONASE) 50 MCG/ACT nasal spray Spray 1 spray into both nostrils 2 times daily.      ipratropium (ATROVENT) 0.02 % neb solution Inhale 0.5 mg into the lungs every 6 hours as needed for wheezing.      isosorbide mononitrate (IMDUR) 30 MG 24 hr tablet Take 1 tablet (30 mg) by mouth daily. 90 tablet 0    ketoconazole (NIZORAL) 2 % external cream Apply topically daily as needed for itching or irritation. To feet      meclizine (ANTIVERT) 12.5 MG tablet Take 12.5 mg by mouth 3 times daily as needed for dizziness.      metoprolol succinate ER (TOPROL XL) 25 MG 24 hr tablet Take 1 tablet (25 mg) by mouth daily. 30 tablet 2    nitroGLYcerin (NITROSTAT) 0.4 MG sublingual tablet For chest pain place 1 tablet under the tongue every 5 minutes for 3 doses. If symptoms persist 5 minutes after 1st dose call 911.  Maximum 3 doses in 15 minutes. Notify provider if no relief after 3 doses. Do NOT give nitroGLYcerin SL if the patient has received sildenafil (VIAGRA/REVATIO), avanafil (STENDRA) or vardenafil (LEVITRA/STAXYN) within the last 24 hours, OR tadalafil (CIALIS/ADCIRCA) within the last 48 hours.  Inform provider if patient has taken one of those medications. 5 tablet 0    omeprazole (PRILOSEC) 20 MG DR capsule Take 20 mg by mouth daily as  needed.      polyethylene glycol (MIRALAX) 17 g packet Take 1 packet by mouth daily as needed for constipation.      polyethylene glycol-propylene glycol (SYSTANE ULTRA) 0.4-0.3 % SOLN ophthalmic solution Place 1-2 drops into both eyes 4 times daily as needed for dry eyes.      sodium chloride (OCEAN) 0.65 % nasal spray Spray 1 spray into both nostrils 2 times daily.      torsemide (DEMADEX) 10 MG tablet Take 1 tablet (10 mg) by mouth daily. Hold for systolic blood pressure less than 100. 30 tablet 0    TRELEGY ELLIPTA 100-62.5-25 MCG/ACT oral inhaler Inhale 2 puffs into the lungs daily      valsartan (DIOVAN) 40 MG tablet Take 0.5 tablets (20 mg) by mouth every evening. Hold if systolic blood pressure less than 100.       No current facility-administered medications for this visit.       ALLERGIES:  Allergies   Allergen Reactions    Sulfa Antibiotics Unknown     Long time ago, she cannot remember    Diazepam Other (See Comments)     Makes more anxious    Meperidine Nausea and Vomiting and Unknown    Morphine Nausea and Unknown    Penicillins Itching    Zafirlukast      She does not remember       SOCIAL HISTORY:  I have reviewed this patient's social history and updated it with pertinent information if needed. Margareth Hogue  reports that she has never smoked. She has never used smokeless tobacco. She reports that she does not currently use alcohol.    FAMILY HISTORY:  I have reviewed this patient's family history and updated it with pertinent information if needed.   Family History   Problem Relation Age of Onset    Heart Defect Mother     Myocardial Infarction Father        REVIEW OF SYSTEMS:  Skin:        Eyes:       ENT:       Respiratory:  Positive for shortness of breath, dyspnea on exertion  Cardiovascular:  palpitations, edema, syncope or near-syncope, exercise intolerance Positive for, dizziness, chest pain, lightheadedness, heaviness, fatigue  Gastroenterology:      Genitourinary:       Musculoskeletal:   "Positive for    Neurologic:  not assessed headaches  Psychiatric:       Heme/Lymph/Imm:  Positive for allergies  Endocrine:  Negative        PHYSICAL EXAM:  /72 (BP Location: Right arm, Patient Position: Sitting)   Pulse 68   Ht 1.613 m (5' 3.5\")   Wt 59.6 kg (131 lb 4.8 oz)   LMP  (LMP Unknown)   SpO2 97%   BMI 22.89 kg/m    Constitutional: alert, no distress  Respiratory: Good bilateral air entry  Cardiovascular: s1 s2 normal, no murmurs  GI: nondistended  Neuropsychiatric: appropriate affact  Edema: none    This note was completed in part using dictation via the Dragon voice recognition software. Some word and grammatical errors may occur and must be interpreted in the appropriate clinical context.  If there are any questions pertaining to this issue, please contact me for further clarification.  "

## 2025-04-04 ENCOUNTER — HOSPITAL ENCOUNTER (OUTPATIENT)
Dept: CARDIAC REHAB | Facility: CLINIC | Age: 80
Discharge: HOME | End: 2025-04-04
Attending: INTERNAL MEDICINE
Payer: COMMERCIAL

## 2025-04-04 ENCOUNTER — HOSPITAL ENCOUNTER (EMERGENCY)
Facility: CLINIC | Age: 80
Discharge: HOME | End: 2025-04-04
Attending: EMERGENCY MEDICINE | Admitting: EMERGENCY MEDICINE
Payer: COMMERCIAL

## 2025-04-04 ENCOUNTER — APPOINTMENT (OUTPATIENT)
Dept: GENERAL RADIOLOGY | Facility: CLINIC | Age: 80
End: 2025-04-04
Attending: EMERGENCY MEDICINE
Payer: COMMERCIAL

## 2025-04-04 VITALS
SYSTOLIC BLOOD PRESSURE: 97 MMHG | TEMPERATURE: 97 F | OXYGEN SATURATION: 100 % | RESPIRATION RATE: 16 BRPM | HEART RATE: 73 BPM | DIASTOLIC BLOOD PRESSURE: 49 MMHG

## 2025-04-04 DIAGNOSIS — R07.9 CHEST PAIN, UNSPECIFIED TYPE: ICD-10-CM

## 2025-04-04 LAB
ALBUMIN SERPL BCG-MCNC: 3.8 G/DL (ref 3.5–5.2)
ALP SERPL-CCNC: 76 U/L (ref 40–150)
ALT SERPL W P-5'-P-CCNC: 16 U/L (ref 0–50)
ANION GAP SERPL CALCULATED.3IONS-SCNC: 12 MMOL/L (ref 7–15)
AST SERPL W P-5'-P-CCNC: 19 U/L (ref 0–45)
BASOPHILS # BLD AUTO: 0 10E3/UL (ref 0–0.2)
BASOPHILS NFR BLD AUTO: 0 %
BILIRUB SERPL-MCNC: 0.3 MG/DL
BUN SERPL-MCNC: 23 MG/DL (ref 8–23)
CALCIUM SERPL-MCNC: 9.2 MG/DL (ref 8.8–10.4)
CHLORIDE SERPL-SCNC: 93 MMOL/L (ref 98–107)
CREAT SERPL-MCNC: 0.74 MG/DL (ref 0.51–0.95)
EGFRCR SERPLBLD CKD-EPI 2021: 81 ML/MIN/1.73M2
EOSINOPHIL # BLD AUTO: 0.1 10E3/UL (ref 0–0.7)
EOSINOPHIL NFR BLD AUTO: 2 %
ERYTHROCYTE [DISTWIDTH] IN BLOOD BY AUTOMATED COUNT: 13.5 % (ref 10–15)
GLUCOSE SERPL-MCNC: 88 MG/DL (ref 70–99)
HCO3 SERPL-SCNC: 25 MMOL/L (ref 22–29)
HCT VFR BLD AUTO: 31.3 % (ref 35–47)
HGB BLD-MCNC: 10.8 G/DL (ref 11.7–15.7)
IMM GRANULOCYTES # BLD: 0 10E3/UL
IMM GRANULOCYTES NFR BLD: 0 %
LYMPHOCYTES # BLD AUTO: 1.7 10E3/UL (ref 0.8–5.3)
LYMPHOCYTES NFR BLD AUTO: 23 %
MCH RBC QN AUTO: 29.3 PG (ref 26.5–33)
MCHC RBC AUTO-ENTMCNC: 34.5 G/DL (ref 31.5–36.5)
MCV RBC AUTO: 85 FL (ref 78–100)
MONOCYTES # BLD AUTO: 0.7 10E3/UL (ref 0–1.3)
MONOCYTES NFR BLD AUTO: 10 %
NEUTROPHILS # BLD AUTO: 4.7 10E3/UL (ref 1.6–8.3)
NEUTROPHILS NFR BLD AUTO: 65 %
NRBC # BLD AUTO: 0 10E3/UL
NRBC BLD AUTO-RTO: 0 /100
PLATELET # BLD AUTO: 285 10E3/UL (ref 150–450)
POTASSIUM SERPL-SCNC: 3.9 MMOL/L (ref 3.4–5.3)
PROT SERPL-MCNC: 5.9 G/DL (ref 6.4–8.3)
RBC # BLD AUTO: 3.68 10E6/UL (ref 3.8–5.2)
SODIUM SERPL-SCNC: 130 MMOL/L (ref 135–145)
TROPONIN T SERPL HS-MCNC: 23 NG/L
TROPONIN T SERPL HS-MCNC: 24 NG/L
WBC # BLD AUTO: 7.2 10E3/UL (ref 4–11)

## 2025-04-04 PROCEDURE — 71046 X-RAY EXAM CHEST 2 VIEWS: CPT

## 2025-04-04 PROCEDURE — 84484 ASSAY OF TROPONIN QUANT: CPT | Performed by: EMERGENCY MEDICINE

## 2025-04-04 PROCEDURE — 93005 ELECTROCARDIOGRAM TRACING: CPT

## 2025-04-04 PROCEDURE — 99285 EMERGENCY DEPT VISIT HI MDM: CPT | Mod: 25

## 2025-04-04 PROCEDURE — 36415 COLL VENOUS BLD VENIPUNCTURE: CPT | Performed by: EMERGENCY MEDICINE

## 2025-04-04 PROCEDURE — 85004 AUTOMATED DIFF WBC COUNT: CPT | Performed by: EMERGENCY MEDICINE

## 2025-04-04 PROCEDURE — 85025 COMPLETE CBC W/AUTO DIFF WBC: CPT | Performed by: EMERGENCY MEDICINE

## 2025-04-04 PROCEDURE — 80053 COMPREHEN METABOLIC PANEL: CPT | Performed by: EMERGENCY MEDICINE

## 2025-04-04 PROCEDURE — 82310 ASSAY OF CALCIUM: CPT | Performed by: EMERGENCY MEDICINE

## 2025-04-04 PROCEDURE — 93798 PHYS/QHP OP CAR RHAB W/ECG: CPT | Performed by: REHABILITATION PRACTITIONER

## 2025-04-04 ASSESSMENT — ACTIVITIES OF DAILY LIVING (ADL)
ADLS_ACUITY_SCORE: 59

## 2025-04-04 NOTE — ED PROVIDER NOTES
Emergency Department Note      History of Present Illness     Chief Complaint   Chest Pain      HPI   Margareth Hogue is a 79 year old female with a history of paroxymal atrial fibrillation, sarcoidosis nonischemic cardiomyopathy presenting with chest pain. The patient reports developing randomly onset chest pain at around noon with associated her left arm pain. Margareth was already feeling unwell this morning due to chest soreness and shortness of breath. She had gone to cardiac rehab earlier today, stating her pain became aggravated after. Margareth called her doctor which prompted her to come in. She has not had similar arm pain but she has had similar chest pain and has been hospitalized multiple times recently and saw her cardiologist a few days ago..  She usually does not get shortness of breath with exertion.     Independent Historian    as detailed above.    Review of External Notes   I reviewed note 04/03/25 Dr. Louis cardiology   as a follow-up from her hospitalization from 327-330 the patient was admitted for chest pain.  EF was found to be 50 to 55%.  She had had stress-induced cardiomyopathy with reduced EF but this now seems to be improving.  Cardiology recommended current medication management..     Note from 3/13/2025 patient was admitted with acute congestive heart failure.    I reviewed echocardiogram from 03/28/25;  Interpretation:  The visual ejection fraction is 50-55%.  There is mild apical wall hypokinesis.  No significant valvular heart disease.    Past Medical History     Medical History and Problem List  Acute hyponatremia      Allergic conjunctivitis of both eyes  Bilateral sensorineural hearing loss  Chronic insomnia  GERD  Adenomatous polyp of colon  Renal cell carcinoma  Hyperlipidemia  IBS  Moderate persistent asthma  Paroxysmal atrial fibrillation  Primary osteoarthritis involving multiple joints  Sarcoidosis, lung  Seasonal allergies  TIA  Facial paresthesia  NSTEMI      Medications  Lipitor  Lasix  Toprol  Diovan  Eliquis  Antivert  Prilosec    Surgical History   Right nephrectomy   Right heart catheterization, ventriculogram and angiogram.    Physical Exam       Patient Vitals for the past 24 hrs:   BP Temp Pulse Resp SpO2   04/04/25 1404 -- -- -- 16 --   04/04/25 1358 97/49 97  F (36.1  C) 73 16 100 %     Physical Exam    Physical Exam   Constitutional:  Patient is oriented to person, place, and time. They appear well-developed and well-nourished. Mild distress secondary to chest pain.    HENT:   Mouth/Throat:   Oropharynx is clear and moist.   Eyes:    Conjunctivae normal and EOM are normal. Pupils are equal, round, and reactive to light.   Neck:    Normal range of motion.   Cardiovascular: Normal rate, regular rhythm and normal heart sounds.  Exam reveals no gallop and no friction rub.  No murmur heard.  Pulmonary/Chest:  Effort normal and breath sounds normal. Patient has no wheezes. Patient has no rales.   Abdominal:   Soft. Bowel sounds are normal. Patient exhibits no mass. There is no tenderness. There is no rebound and no guarding.   Musculoskeletal:  Normal range of motion. Patient exhibits no edema.   Neurological:   Patient is alert and oriented to person, place, and time. Patient has normal strength. No cranial nerve deficit or sensory deficit. GCS 15.   Skin:   Skin is warm and dry. No rash noted. No erythema.   Psychiatric:   Patient has a normal mood and affect. Patient's behavior is normal. Judgment and thought content normal.      Diagnostics     Lab Results   Labs Ordered and Resulted from Time of ED Arrival to Time of ED Departure   TROPONIN T, HIGH SENSITIVITY - Abnormal       Result Value    Troponin T, High Sensitivity 24 (*)    COMPREHENSIVE METABOLIC PANEL - Abnormal    Sodium 130 (*)     Potassium 3.9      Carbon Dioxide (CO2) 25      Anion Gap 12      Urea Nitrogen 23.0      Creatinine 0.74      GFR Estimate 81      Calcium 9.2      Chloride 93 (*)      Glucose 88      Alkaline Phosphatase 76      AST 19      ALT 16      Protein Total 5.9 (*)     Albumin 3.8      Bilirubin Total 0.3     CBC WITH PLATELETS AND DIFFERENTIAL - Abnormal    WBC Count 7.2      RBC Count 3.68 (*)     Hemoglobin 10.8 (*)     Hematocrit 31.3 (*)     MCV 85      MCH 29.3      MCHC 34.5      RDW 13.5      Platelet Count 285      % Neutrophils 65      % Lymphocytes 23      % Monocytes 10      % Eosinophils 2      % Basophils 0      % Immature Granulocytes 0      NRBCs per 100 WBC 0      Absolute Neutrophils 4.7      Absolute Lymphocytes 1.7      Absolute Monocytes 0.7      Absolute Eosinophils 0.1      Absolute Basophils 0.0      Absolute Immature Granulocytes 0.0      Absolute NRBCs 0.0     TROPONIN T, HIGH SENSITIVITY - Abnormal    Troponin T, High Sensitivity 23 (*)        Imaging   XR Chest 2 Views   Final Result   IMPRESSION: Stable cardiac silhouette. Calcified mediastinal and hilar lymph nodes compatible with remote granulomatous disease. No focal airspace consolidation. No pleural effusion or discernible pneumothorax. Bilateral shoulder arthroplasties.    Thoracolumbar scoliosis.          EKG   ECG taken at 1351, ECG read at 1600  Normal sinus rhythm   Low voltage QRS  Cannot rule out anterior infarct, age undetermined    Abnormal ECG  No significant changes as compared to prior, dated 03/29/25.  Rate 75 bpm. OH interval 166 ms. QRS duration 92 ms. QT/QTc 388/433 ms. P-R-T axes 70 -8 61.    Independent Interpretation   CXR: No pneumothorax.    ED Course      Medications Administered   Medications - No data to display    Procedures   Procedures     Discussion of Management   None    ED Course   ED Course as of 04/05/25 2147 Fri Apr 04, 2025 1616 I obtained the history and examined the patient as noted above.     1743 I rechecked the patient and explained findings.        Additional Documentation  None    Medical Decision Making / Diagnosis     CMS Diagnoses: None    MIPS        None    [facility]   Margareth Hogue is a 79 year old female who presents with chest pain that she has had multiple times what was new is that her left arm ached.  This happened after she had completed cardiac rehab.  The patient's  was wondering if maybe she strained something in cardiac rehab.  ECG was performed.  There were no significant new changes.  Cardiac enzymes were performed delta troponins are flat.  Although detectable they are within her normal range.  Chest x-ray does not show any acute decompensated heart congestive heart failure infiltrates mass abnormal mediastinum.  She has on Eliquis she is not hypoxic or tachycardic I doubt this is PE as she has been compliant.  On my evaluation she does not appear to be in any discomfort.  The remainder of her blood work is within her normal limits.  I think given her multiple hospitalizations extensive cardiac testing this is similar pain that she has had in the past do not feel she needs to be admitted readmitted for same.  Her cardiologist started isosorbide yesterday for her recurrent chest pain and she has taken 1 dose.  I believe they are trying to optimize and minimize these episodes that she is having.    I feel she is safe for discharge with close cardiology follow-up.  Of note her initial blood pressure was 97/49 when I evaluated her it was normal.      Disposition   The patient was discharged.     Diagnosis     ICD-10-CM    1. Chest pain, unspecified type  R07.9            Discharge Medications   Discharge Medication List as of 4/4/2025  6:04 PM          Scribe Disclosure:  I, Ceci Powell, am serving as a scribe at 4:03 PM on 4/4/2025 to document services personally performed by Bianca Cerda MD based on my observations and the provider's statements to me.        Bianca Cerda MD  04/05/25 2151       Bianca Cerda MD  04/05/25 2151

## 2025-04-04 NOTE — ED TRIAGE NOTES
Pt arrives via triage presenting with chest pressure. Pt states pressure began at noon, radiating down L arm. Pt took 3 nitroglycerin at home with no relief.      Triage Assessment (Adult)       Row Name 04/04/25 6064          Triage Assessment    Airway WDL WDL        Respiratory WDL    Respiratory WDL WDL        Skin Circulation/Temperature WDL    Skin Circulation/Temperature WDL WDL        Cardiac WDL    Cardiac WDL X  Chest pain        Peripheral/Neurovascular WDL    Peripheral Neurovascular WDL WDL        Cognitive/Neuro/Behavioral WDL    Cognitive/Neuro/Behavioral WDL WDL

## 2025-04-05 LAB
ATRIAL RATE - MUSE: 75 BPM
DIASTOLIC BLOOD PRESSURE - MUSE: NORMAL MMHG
INTERPRETATION ECG - MUSE: NORMAL
P AXIS - MUSE: 70 DEGREES
PR INTERVAL - MUSE: 166 MS
QRS DURATION - MUSE: 92 MS
QT - MUSE: 388 MS
QTC - MUSE: 433 MS
R AXIS - MUSE: -8 DEGREES
SYSTOLIC BLOOD PRESSURE - MUSE: NORMAL MMHG
T AXIS - MUSE: 61 DEGREES
VENTRICULAR RATE- MUSE: 75 BPM

## 2025-04-06 ENCOUNTER — NURSE TRIAGE (OUTPATIENT)
Dept: NURSING | Facility: CLINIC | Age: 80
End: 2025-04-06
Payer: COMMERCIAL

## 2025-04-06 ENCOUNTER — HEALTH MAINTENANCE LETTER (OUTPATIENT)
Age: 80
End: 2025-04-06

## 2025-04-06 NOTE — TELEPHONE ENCOUNTER
Nurse Triage SBAR    Is this a 2nd Level Triage? NO    Situation: BLE pain    Background: Patient states that she took her newly ordered medications and is now experiencing lower leg pain in both legs.  Patient denies redness, warmth or swelling. Patient states that she is able to walk. Patient has not taken any ibuprofen or tylenol. Patient is inquiring as to which medications she should omit.      Assessment: leg pain without redness, swelling or warmth    Protocol Recommended Disposition:   Home Care    Recommendation: Writer explained to patient that she needs to discuss this with a provider and that she should continue to take her medications as ordered until she can do so. Writer suggested patient take a warm bath, or shower, try heat pads, take some tylenol or ibuprofen whichever is allowed. Patient states that she isn't going to take anything. Patient ended call before all care advice could be given.     Mame Beard RN on 4/6/2025 at 12:53 AM      Does the patient meet one of the following criteria for ADS visit consideration? No    Reason for Disposition   Leg pain    Additional Information   Negative: Looks like a broken bone or dislocated joint (e.g., crooked or deformed)   Negative: Sounds like a life-threatening emergency to the triager   Negative: Chest pain   Negative: Difficulty breathing   Negative: Entire foot is cool or blue in comparison to other side   Negative: Unable to walk   Negative: [1] Red area or streak AND [2] fever   Negative: [1] Swollen joint AND [2] fever   Negative: [1] Cast on leg or ankle AND [2] now increased pain   Negative: Patient sounds very sick or weak to the triager   Negative: [1] SEVERE pain (e.g., excruciating, unable to do any normal activities) AND [2] not improved after 2 hours of pain medicine   Negative: [1] Thigh or calf pain AND [2] only 1 side AND [3] present > 1 hour (Exception: Chronic unchanged pain.)   Negative: [1] Thigh, calf, or ankle swelling AND [2]  "only 1 side   Negative: [1] Thigh, calf, or ankle swelling AND [2] bilateral AND [3] 1 side is more swollen   Negative: [1] Red area or streak AND [2] large (> 2 in. or 5 cm)   Negative: History of prior \"blood clot\" in leg or lungs (i.e., deep vein thrombosis, pulmonary embolism)   Negative: History of inherited increased risk of blood clots (e.g., Factor 5 Leiden, Anti-thrombin 3, Protein C or Protein S deficiency, Prothrombin mutation)   Negative: Major surgery in past month   Negative: Hip or leg fracture (broken bone) in past month (or had cast on leg or ankle in past month)   Negative: Illness requiring prolonged bedrest in past month (e.g., immobilization, long hospital stay)   Negative: Long-distance travel in past month (e.g., car, bus, train, plane; with trip lasting 6 or more hours)   Negative: Cancer treatment in past six months (or has cancer now)   Negative: [1] Painful rash AND [2] multiple small blisters grouped together (i.e., dermatomal distribution or \"band\" or \"stripe\")   Negative: Looks like a boil, infected sore, deep ulcer or other infected rash (spreading redness, pus)   Negative: [1] Localized rash is very painful AND [2] no fever   Negative: Numbness in a leg or foot (i.e., loss of sensation)   Negative: Localized pain, redness or hard lump along vein   Negative: [1] MODERATE pain (e.g., interferes with normal activities, limping) AND [2] present > 3 days   Negative: [1] Swollen joint AND [2] no fever or redness   Negative: [1] Leg pain which occurs after walking a certain distance AND [2] disappears with rest AND [3] age > 50   Negative: [1] Pain in front of the lower leg(s) (shins) AND [2] occurs with running or jumping exercise (e.g., jogging,  basketball)   Negative: [1] MILD pain (e.g., does not interfere with normal activities) AND [2] present > 7 days   Negative: Leg pain or muscle cramp is a chronic symptom (recurrent or ongoing AND present > 4 weeks)    Protocols used: Leg " Pain-A-AH

## 2025-04-07 ENCOUNTER — HOSPITAL ENCOUNTER (OUTPATIENT)
Dept: CARDIAC REHAB | Facility: CLINIC | Age: 80
Discharge: HOME | End: 2025-04-07
Attending: INTERNAL MEDICINE
Payer: COMMERCIAL

## 2025-04-07 PROCEDURE — 93798 PHYS/QHP OP CAR RHAB W/ECG: CPT | Performed by: OCCUPATIONAL THERAPIST

## 2025-04-09 ENCOUNTER — HOSPITAL ENCOUNTER (OUTPATIENT)
Dept: CARDIAC REHAB | Facility: CLINIC | Age: 80
Discharge: HOME | End: 2025-04-09
Attending: INTERNAL MEDICINE
Payer: COMMERCIAL

## 2025-04-09 PROCEDURE — 93798 PHYS/QHP OP CAR RHAB W/ECG: CPT | Performed by: OCCUPATIONAL THERAPIST

## 2025-04-11 ENCOUNTER — HOSPITAL ENCOUNTER (OUTPATIENT)
Dept: CARDIAC REHAB | Facility: CLINIC | Age: 80
Discharge: HOME | End: 2025-04-11
Attending: INTERNAL MEDICINE
Payer: COMMERCIAL

## 2025-04-11 PROCEDURE — 93798 PHYS/QHP OP CAR RHAB W/ECG: CPT

## 2025-04-14 ENCOUNTER — HOSPITAL ENCOUNTER (OUTPATIENT)
Dept: CARDIAC REHAB | Facility: CLINIC | Age: 80
Discharge: HOME | End: 2025-04-14
Attending: INTERNAL MEDICINE
Payer: COMMERCIAL

## 2025-04-14 ENCOUNTER — TELEPHONE (OUTPATIENT)
Dept: CARDIOLOGY | Facility: CLINIC | Age: 80
End: 2025-04-14
Payer: COMMERCIAL

## 2025-04-14 DIAGNOSIS — I50.20 HEART FAILURE WITH REDUCED EJECTION FRACTION, NYHA CLASS I (H): ICD-10-CM

## 2025-04-14 PROCEDURE — 93798 PHYS/QHP OP CAR RHAB W/ECG: CPT

## 2025-04-14 RX ORDER — VALSARTAN 40 MG/1
20 TABLET ORAL EVERY EVENING
Qty: 45 TABLET | Refills: 1 | Status: SHIPPED | OUTPATIENT
Start: 2025-04-14

## 2025-04-14 NOTE — TELEPHONE ENCOUNTER
"Patient sent in MyChart asking for refill and clarification of her medication Valsartan, prescribed by Dr. Louis.    Per OV note from Dr. Louis, recent events include:  \" patient was recently admitted to the hospital for chest pain.  EF was 30 to 35%, coronary angiogram was negative.  Diagnosis of stress-induced cardiomyopathy was made \"    Reviewed OV note from 4\3\25 and the ER visit from 4\4\25.      Patient has appmnt on 7\7 with Shelli Yuen for follow up, and Echo scheduled for 7\2.         Writer notes there is also an Echo scheduled for 4\21, but since she just had it done in the ER  on 3\27 setting, writer will clarify if there is a reason to keep the 4\21 Echo.      Echo done 12\30\24    LVEF 55 - 60%;  Echo done  3\5\25 just after coronary angiogram showed  LVEF  of just 35 - 50%, development of moderate  to severe apical dyskineiss.   Echo done 3\28\25 shows  LVEF of 50 - 55%      Sent in new [employer])and messaged back to patient.    Routing to MD for review.    Cris Carl RN on 4/14/2025 at 9:11 AM            "

## 2025-04-16 ENCOUNTER — HOSPITAL ENCOUNTER (OUTPATIENT)
Dept: CARDIAC REHAB | Facility: CLINIC | Age: 80
Discharge: HOME | End: 2025-04-16
Attending: INTERNAL MEDICINE
Payer: COMMERCIAL

## 2025-04-16 PROCEDURE — 93798 PHYS/QHP OP CAR RHAB W/ECG: CPT

## 2025-04-17 ENCOUNTER — APPOINTMENT (OUTPATIENT)
Dept: GENERAL RADIOLOGY | Facility: CLINIC | Age: 80
End: 2025-04-17
Attending: EMERGENCY MEDICINE
Payer: COMMERCIAL

## 2025-04-17 ENCOUNTER — HOSPITAL ENCOUNTER (EMERGENCY)
Facility: CLINIC | Age: 80
Discharge: HOME | End: 2025-04-17
Attending: EMERGENCY MEDICINE
Payer: COMMERCIAL

## 2025-04-17 VITALS
WEIGHT: 128 LBS | HEIGHT: 63 IN | OXYGEN SATURATION: 97 % | HEART RATE: 79 BPM | SYSTOLIC BLOOD PRESSURE: 106 MMHG | RESPIRATION RATE: 18 BRPM | TEMPERATURE: 97.9 F | DIASTOLIC BLOOD PRESSURE: 63 MMHG | BODY MASS INDEX: 22.68 KG/M2

## 2025-04-17 DIAGNOSIS — R07.9 CHEST PAIN, UNSPECIFIED TYPE: ICD-10-CM

## 2025-04-17 LAB
ANION GAP SERPL CALCULATED.3IONS-SCNC: 11 MMOL/L (ref 7–15)
ATRIAL RATE - MUSE: 78 BPM
BASOPHILS # BLD AUTO: 0 10E3/UL (ref 0–0.2)
BASOPHILS NFR BLD AUTO: 1 %
BUN SERPL-MCNC: 17.3 MG/DL (ref 8–23)
CALCIUM SERPL-MCNC: 9 MG/DL (ref 8.8–10.4)
CHLORIDE SERPL-SCNC: 94 MMOL/L (ref 98–107)
CREAT SERPL-MCNC: 0.75 MG/DL (ref 0.51–0.95)
DIASTOLIC BLOOD PRESSURE - MUSE: NORMAL MMHG
EGFRCR SERPLBLD CKD-EPI 2021: 80 ML/MIN/1.73M2
EOSINOPHIL # BLD AUTO: 0.1 10E3/UL (ref 0–0.7)
EOSINOPHIL NFR BLD AUTO: 1 %
ERYTHROCYTE [DISTWIDTH] IN BLOOD BY AUTOMATED COUNT: 13.7 % (ref 10–15)
GLUCOSE SERPL-MCNC: 128 MG/DL (ref 70–99)
HCO3 SERPL-SCNC: 26 MMOL/L (ref 22–29)
HCT VFR BLD AUTO: 33.4 % (ref 35–47)
HGB BLD-MCNC: 11.3 G/DL (ref 11.7–15.7)
HOLD SPECIMEN: NORMAL
IMM GRANULOCYTES # BLD: 0 10E3/UL
IMM GRANULOCYTES NFR BLD: 0 %
INTERPRETATION ECG - MUSE: NORMAL
LYMPHOCYTES # BLD AUTO: 1.3 10E3/UL (ref 0.8–5.3)
LYMPHOCYTES NFR BLD AUTO: 20 %
MCH RBC QN AUTO: 29 PG (ref 26.5–33)
MCHC RBC AUTO-ENTMCNC: 33.8 G/DL (ref 31.5–36.5)
MCV RBC AUTO: 86 FL (ref 78–100)
MONOCYTES # BLD AUTO: 0.5 10E3/UL (ref 0–1.3)
MONOCYTES NFR BLD AUTO: 7 %
NEUTROPHILS # BLD AUTO: 4.6 10E3/UL (ref 1.6–8.3)
NEUTROPHILS NFR BLD AUTO: 71 %
NRBC # BLD AUTO: 0 10E3/UL
NRBC BLD AUTO-RTO: 0 /100
NT-PROBNP SERPL-MCNC: 838 PG/ML (ref 0–1800)
P AXIS - MUSE: 65 DEGREES
PLATELET # BLD AUTO: 372 10E3/UL (ref 150–450)
POTASSIUM SERPL-SCNC: 4.2 MMOL/L (ref 3.4–5.3)
PR INTERVAL - MUSE: 180 MS
QRS DURATION - MUSE: 94 MS
QT - MUSE: 398 MS
QTC - MUSE: 453 MS
R AXIS - MUSE: -18 DEGREES
RBC # BLD AUTO: 3.9 10E6/UL (ref 3.8–5.2)
SODIUM SERPL-SCNC: 131 MMOL/L (ref 135–145)
SYSTOLIC BLOOD PRESSURE - MUSE: NORMAL MMHG
T AXIS - MUSE: 56 DEGREES
TROPONIN T SERPL HS-MCNC: 25 NG/L
TROPONIN T SERPL HS-MCNC: 27 NG/L
VENTRICULAR RATE- MUSE: 78 BPM
WBC # BLD AUTO: 6.5 10E3/UL (ref 4–11)

## 2025-04-17 PROCEDURE — 82310 ASSAY OF CALCIUM: CPT | Performed by: EMERGENCY MEDICINE

## 2025-04-17 PROCEDURE — 93005 ELECTROCARDIOGRAM TRACING: CPT

## 2025-04-17 PROCEDURE — 84484 ASSAY OF TROPONIN QUANT: CPT | Performed by: EMERGENCY MEDICINE

## 2025-04-17 PROCEDURE — 36415 COLL VENOUS BLD VENIPUNCTURE: CPT | Performed by: EMERGENCY MEDICINE

## 2025-04-17 PROCEDURE — 85004 AUTOMATED DIFF WBC COUNT: CPT | Performed by: EMERGENCY MEDICINE

## 2025-04-17 PROCEDURE — 80048 BASIC METABOLIC PNL TOTAL CA: CPT | Performed by: EMERGENCY MEDICINE

## 2025-04-17 PROCEDURE — 99285 EMERGENCY DEPT VISIT HI MDM: CPT | Mod: 25

## 2025-04-17 PROCEDURE — 71046 X-RAY EXAM CHEST 2 VIEWS: CPT

## 2025-04-17 PROCEDURE — 85025 COMPLETE CBC W/AUTO DIFF WBC: CPT | Performed by: EMERGENCY MEDICINE

## 2025-04-17 PROCEDURE — 83880 ASSAY OF NATRIURETIC PEPTIDE: CPT | Performed by: EMERGENCY MEDICINE

## 2025-04-17 ASSESSMENT — ACTIVITIES OF DAILY LIVING (ADL)
ADLS_ACUITY_SCORE: 59

## 2025-04-17 ASSESSMENT — COLUMBIA-SUICIDE SEVERITY RATING SCALE - C-SSRS
6. HAVE YOU EVER DONE ANYTHING, STARTED TO DO ANYTHING, OR PREPARED TO DO ANYTHING TO END YOUR LIFE?: NO
2. HAVE YOU ACTUALLY HAD ANY THOUGHTS OF KILLING YOURSELF IN THE PAST MONTH?: NO
1. IN THE PAST MONTH, HAVE YOU WISHED YOU WERE DEAD OR WISHED YOU COULD GO TO SLEEP AND NOT WAKE UP?: NO

## 2025-04-17 NOTE — ED TRIAGE NOTES
Pt comes from home with complaint of CP that started around 0500 this morning. Pt took nitroglycerin with no relief, last dose 1030. Took total 3 doses. Nurse at heart clinic told her to come here. States it radiates to her left arm and back. Associated SOB. On blood thinners.     Triage Assessment (Adult)       Row Name 04/17/25 1211          Triage Assessment    Airway WDL WDL        Respiratory WDL    Respiratory WDL rhythm/pattern     Rhythm/Pattern, Respiratory shortness of breath        Skin Circulation/Temperature WDL    Skin Circulation/Temperature WDL WDL        Cardiac WDL    Cardiac WDL chest pain        Chest Pain Assessment    Chest Pain Location midsternal     Chest Pain Radiation arm;back     Character pressure     Chest Pain Intervention 12-lead ECG obtained;cardiac biomarkers drawn        Peripheral/Neurovascular WDL    Peripheral Neurovascular WDL WDL        Cognitive/Neuro/Behavioral WDL    Cognitive/Neuro/Behavioral WDL WDL        [place]lin Coma Scale    Best Eye Response 4-->(E4) spontaneous     Best Motor Response 6-->(M6) obeys commands     Best Verbal Response 5-->(V5) oriented     [place] Coma Scale Score 15     Assessment Qualifiers patient not sedated/intubated

## 2025-04-17 NOTE — ED PROVIDER NOTES
"  Emergency Department Note      History of Present Illness     Chief Complaint   Chest Pain      HPI   Margareth Hogue is a 79 year old female with history of PAF on Eliquis, HFrEF, NSTEMI, stress-induced cardiomyopathy, GERD, sarcoidosis, and asthma who presents to the ED with her  for evaluation of shortness of breath and chest pressure. Patient awoke with this these symptoms around 0500 today, which have persisted since. She denies chest pain and explains it is more like pressure. She took 3x nitroglycerin today. Her blood pressure was 97/32 today, which is low for her. Margareth states she is otherwise feeling fine. She called the heart clinic today and was told to come to the ED for evaluation. Patient denies recent illness or cold symptoms. She has history of heart failure and is on a diuretic but reports she is still gaining weight.    Independent Historian   None    Review of External Notes   Reviewed a cardiology note from 4/14/2025    Past Medical History     Medical History and Problem List   Asthma  GERD  HFrEF  PAF  Stress-induced cardiomyopathy  Chronic insomnia  Adenomatous polyp of colon  Renal cell carcinoma  Hyperlipidemia  IBS  Osteoarthritis  TIA  Seasonal allergies  Sarcoidosis, lung  NSTEMI    Medications   Atorvastatin  Isosorbide mononitrate  Metoprolol succinate ER  Torsemide  Valsartan  Apixaban    Surgical History   Unilateral nephrectomy  Left heart catheterization    Physical Exam     Patient Vitals for the past 24 hrs:   BP Temp Temp src Pulse Resp SpO2 Height Weight   04/17/25 1209 106/63 97.9  F (36.6  C) Temporal 79 18 97 % 1.6 m (5' 3\") 58.1 kg (128 lb)     Physical Exam  General: Alert, No distress. Nontoxic appearance  Head: No signs of trauma.   Mouth/Throat: Oropharynx moist.   Eyes: Conjunctivae are normal. Pupils are equal..   Neck: Normal range of motion.    CV: Appears well perfused. Regular rate and rhythm.  Resp:No respiratory distress. Lungs clear.  MSK: Normal range " of motion. No obvious deformity.   Neuro: The patient is alert and interactive. KELLY. Speech normal. GCS 15  Skin: No lesion or sign of trauma noted.   Psych: normal mood and affect. behavior is normal.      Diagnostics     Lab Results   Labs Ordered and Resulted from Time of ED Arrival to Time of ED Departure   TROPONIN T, HIGH SENSITIVITY - Abnormal       Result Value    Troponin T, High Sensitivity 27 (*)    BASIC METABOLIC PANEL - Abnormal    Sodium 131 (*)     Potassium 4.2      Chloride 94 (*)     Carbon Dioxide (CO2) 26      Anion Gap 11      Urea Nitrogen 17.3      Creatinine 0.75      GFR Estimate 80      Calcium 9.0      Glucose 128 (*)    CBC WITH PLATELETS AND DIFFERENTIAL - Abnormal    WBC Count 6.5      RBC Count 3.90      Hemoglobin 11.3 (*)     Hematocrit 33.4 (*)     MCV 86      MCH 29.0      MCHC 33.8      RDW 13.7      Platelet Count 372      % Neutrophils 71      % Lymphocytes 20      % Monocytes 7      % Eosinophils 1      % Basophils 1      % Immature Granulocytes 0      NRBCs per 100 WBC 0      Absolute Neutrophils 4.6      Absolute Lymphocytes 1.3      Absolute Monocytes 0.5      Absolute Eosinophils 0.1      Absolute Basophils 0.0      Absolute Immature Granulocytes 0.0      Absolute NRBCs 0.0     TROPONIN T, HIGH SENSITIVITY - Abnormal    Troponin T, High Sensitivity 25 (*)    NT PROBNP INPATIENT - Normal    N terminal Pro BNP Inpatient 838         Imaging   XR Chest 2 Views   Final Result   IMPRESSION: Bilateral shoulder arthroplasties. Stable extensive calcified mediastinal and hilar lymph nodes. No acute findings. No focal pneumonic consolidation or pleural effusion. Normal heart size. Spinal degenerative changes.          EKG   ECG taken at 1212, ECG read at 1429  Normal sinus rhythm  Low voltage QRS  Borderline ECG   No change as compared to prior, dated 04/04/2025.  Rate 78 bpm. GA interval 180 ms. QRS duration 94 ms. QT/QTc 398/453 ms. P-R-T axes 65 -18 56.    Independent  Interpretation   CXR: No infiltrate or pleural effusion.    ED Course      Medications Administered   Medications - No data to display    Discussion of Management   None    ED Course   ED Course as of 04/17/25 2035   Thu Apr 17, 2025   1329 I obtained history and examined the patient as noted above.    1643 I rechecked the patient and explained findings. We discussed plans for discharge and the patient is comfortable with this plan.        Additional Documentation  None    Medical Decision Making / Diagnosis     CMS Diagnoses: None    MIPS   None    MDM   Margareth Hogue is a 79 year old female Margareth Hogue is a 79 year old female presented to the Emergency Department with a complaint of chest pain. Fortunately the workup in the ED has been unremarkable and at this time I am not concerned for ACS. The EKG shows no acute ischemic changes. The troponin is negative (25 and 27). Based on the [facility] high sensitivity troponin pathway, this should rule the patient out for myocardial injury.    I considered other possible causes of chest pain including PE, infection, pneumothorax, aortic dissection, inflammatory conditions (percarditis, etc.) and even more benign causes such as reflux and esophageal motility issues. The physical exam, laboratory, and radiological findings listed above make life threatening conditions less likely. At this time I believe the patient is safe for discharge. I have encouraged close outpatient follow up.     Anticipatory guidance given prior to discharge.        Disposition   The patient was discharged.     Diagnosis     ICD-10-CM    1. Chest pain, unspecified type  R07.9            Discharge Medications   Discharge Medication List as of 4/17/2025  4:41 PM            Scribe Disclosure:  I, Sherri Rosas, am serving as a scribe at 2:10 PM on 4/17/2025 to document services personally performed by Tanvir Kruse MD based on my observations and the provider's statements to me.         Tanvir Kruse MD  04/17/25 2038

## 2025-04-18 ENCOUNTER — HOSPITAL ENCOUNTER (OUTPATIENT)
Dept: CARDIAC REHAB | Facility: CLINIC | Age: 80
Discharge: HOME | End: 2025-04-18
Attending: INTERNAL MEDICINE
Payer: COMMERCIAL

## 2025-04-18 PROCEDURE — 93798 PHYS/QHP OP CAR RHAB W/ECG: CPT | Performed by: REHABILITATION PRACTITIONER

## 2025-04-21 ENCOUNTER — HOSPITAL ENCOUNTER (OUTPATIENT)
Dept: CARDIAC REHAB | Facility: CLINIC | Age: 80
Discharge: HOME | End: 2025-04-21
Attending: INTERNAL MEDICINE
Payer: COMMERCIAL

## 2025-04-21 PROCEDURE — 93798 PHYS/QHP OP CAR RHAB W/ECG: CPT

## 2025-04-21 PROCEDURE — 93797 PHYS/QHP OP CAR RHAB WO ECG: CPT

## 2025-04-22 ENCOUNTER — TRANSFERRED RECORDS (OUTPATIENT)
Dept: HEALTH INFORMATION MANAGEMENT | Facility: CLINIC | Age: 80
End: 2025-04-22
Payer: COMMERCIAL

## 2025-04-23 ENCOUNTER — HOSPITAL ENCOUNTER (OUTPATIENT)
Dept: CARDIAC REHAB | Facility: CLINIC | Age: 80
Discharge: HOME | End: 2025-04-23
Attending: INTERNAL MEDICINE
Payer: COMMERCIAL

## 2025-04-23 PROCEDURE — 93798 PHYS/QHP OP CAR RHAB W/ECG: CPT

## 2025-04-25 ENCOUNTER — HOSPITAL ENCOUNTER (OUTPATIENT)
Dept: CARDIAC REHAB | Facility: CLINIC | Age: 80
Discharge: HOME | End: 2025-04-25
Attending: INTERNAL MEDICINE
Payer: COMMERCIAL

## 2025-04-25 PROCEDURE — 93798 PHYS/QHP OP CAR RHAB W/ECG: CPT

## 2025-04-28 ENCOUNTER — HOSPITAL ENCOUNTER (OUTPATIENT)
Dept: CARDIAC REHAB | Facility: CLINIC | Age: 80
Discharge: HOME | End: 2025-04-28
Attending: INTERNAL MEDICINE
Payer: COMMERCIAL

## 2025-04-28 PROCEDURE — 93798 PHYS/QHP OP CAR RHAB W/ECG: CPT

## 2025-04-30 ENCOUNTER — HOSPITAL ENCOUNTER (OUTPATIENT)
Dept: CARDIAC REHAB | Facility: CLINIC | Age: 80
Discharge: HOME | End: 2025-04-30
Attending: INTERNAL MEDICINE
Payer: COMMERCIAL

## 2025-04-30 PROCEDURE — 93798 PHYS/QHP OP CAR RHAB W/ECG: CPT | Performed by: REHABILITATION PRACTITIONER

## 2025-05-01 ENCOUNTER — OFFICE VISIT (OUTPATIENT)
Dept: CARDIOLOGY | Facility: CLINIC | Age: 80
End: 2025-05-01
Attending: INTERNAL MEDICINE
Payer: COMMERCIAL

## 2025-05-01 VITALS
DIASTOLIC BLOOD PRESSURE: 79 MMHG | WEIGHT: 132 LBS | OXYGEN SATURATION: 98 % | SYSTOLIC BLOOD PRESSURE: 116 MMHG | HEIGHT: 64 IN | BODY MASS INDEX: 22.53 KG/M2 | HEART RATE: 77 BPM

## 2025-05-01 DIAGNOSIS — I10 BENIGN ESSENTIAL HYPERTENSION: ICD-10-CM

## 2025-05-01 DIAGNOSIS — I48.92 ATRIAL FLUTTER WITH RAPID VENTRICULAR RESPONSE (H): ICD-10-CM

## 2025-05-01 DIAGNOSIS — I50.20 HEART FAILURE WITH REDUCED EJECTION FRACTION, NYHA CLASS I (H): ICD-10-CM

## 2025-05-01 DIAGNOSIS — R07.89 ATYPICAL CHEST PAIN: Primary | ICD-10-CM

## 2025-05-01 NOTE — PROGRESS NOTES
Cardiology Clinic Progress Note  Margareth Hogue MRN# [id number]   YOB: 1945 Age: 80 year old   Primary Cardiologist: Dr. Louis Reason for visit: Follow up ER visit             Assessment and Plan:       1.  Nonischemic cardiomyopathy, likely Takotsubo with recovered EF  - 3/7/25 Coronary angiogram: Showing no coronary artery disease, just minimal atherosclerosis in the LAD, LV with severe apical ballooning and sparing of the base  - Maintained on metoprolol, valsartan, and torsemide 10 mg daily  - 3/28/2020 5 repeat TTE showing improvement in LVEF to low normal, 50 to 55% and improvement in apical hypokinesis    2.  History of atrial fibrillation, s/p PVI/atrial flutter  - Anticoagulated with dose adjusted Eliquis for thromboembolic prophylaxis  - Rate controlled with metoprolol    3.  Essential hypertension well-controlled  -Reviewed log of home blood pressure readings which primarily are in the 110-120 range, only needed to hold valsartan and torsemide twice in the last 2 months    4.  Near syncope  -Occurred in the setting of new heart failure in March, had another episode prior to the first episode where she slipped and fell and was told not to drive, has been stable since that time without any recurrence of syncope    5.  Pulmonary sarcoid, stable    6.  Chest pain  - 2 emergency room visits, both of which demonstrated flat troponin trends without any ischemic EKG changes, 1 episode occurred shortly after discontinuation of scheduled torsemide    Lina is now stabilized from a cardiovascular standpoint without any symptoms suggestive of decompensated heart failure or angina.  She does appear to have a very narrow euvolemic window and becomes symptomatic even with a weight gain of only about 3 pounds.  I encouraged her to continue to monitor her salt and check her weights daily contact us if she should be gaining any weight more than 2 pounds.  We may recommend she takes extra diuretics on those  days.  For now I will leave her Imdur as she is doing very well and progressing in cardiac rehab, however eventually we may want to discontinue that considering she does not have any clinically significant coronary artery disease and look toward uptitrating her valsartan if needed.  I did reassure her that her most recent echocardiogram from late March did show recovery in her LVEF and I would expect this would be even more so on her next echocardiogram planned in 3 months time.  Will continue her current cardiac regimen and plan to repeat labs again in 3 months as well.  Thankfully on her recent lab work it showed stable hyponatremia on her current dose of torsemide.    Plan:  Continue apixaban 2.5 mg twice daily  Continue atorvastatin 10 mg daily  Continue Imdur 30 mg daily  Continue metoprolol XL 25 mg daily  Continue valsartan 20 mg daily, with hold parameters  Continue torsemide 10 mg daily, with hold parameters  Repeat BMP and echocardiogram in July    Follow up: Follow-up with me in July as scheduled        History of Presenting Illness:    Margareth Hogue is a very pleasant 80 year old female with a history of atrial fibrillation, s/p PVI, atrial flutter/tachycardia, dyslipidemia, renal cell carcinoma, s/p right nephrectomy, pulmonary sarcoidosis, GERD, TIA.    She was most recently seen by Dr. Louis in April of this year following a hospital admission for chest pain.  An echocardiogram done during that hospital stay showed an ejection fraction of 30 to 35%.  She did undergo coronary angiogram which showed no coronary artery disease.  She was diagnosed with a stress cardiomyopathy and initiated on appropriate medications including valsartan and metoprolol along with torsemide.   Her transthoracic echo from 3/28/2025 showed an LVEF of 50 to 55% with mild apical wall hypokinesis and no significant valvular disease.    At their clinic visit, she continued to report episodes of chest pain requiring sublingual  nitroglycerin.    She was seen in the emergency room 2 times this month with reports of chest pain. Her EKGs were non-ischemic and her torsemide was resumed daily. Troponins were trended and flat.     Patient is here today for a follow-up of her recent emergency room visits.  She presents with her  Torres today.    Patient reports feeling good. Monitoring weights daily a home, weighing 126-129#.  She says she has not had any further episodes of chest pain over the last few weeks and that cardiac rehab is going very well.  She is able to exert herself without any symptoms.  She did states she had 1 episode of chest pressure that lasted a few hours at home, but this was in the setting of weight gain of about 3 pounds and over the course of the day it got better as she diuresed.    Patient denies chest pain or chest tightness. Denies dizziness, lightheadedness or other presyncopal symptoms. Denies tachycardia or palpitations.  No issues with lower extremity edema.    Most recent labs from 4/17/2025 show sodium 131, potassium 4.2, BUN 17.3, creatinine 0.71, GFR 80, NT-proBNP 838, hemoglobin 11.3.     Blood pressure 116/79 and HR 77 in clinic today. Denies any bleeding issues with eliquis. She has been compliant with medications.         Recent Hospitalizations   As  above          Social History      Social History     Socioeconomic History    Marital status:      Spouse name: Not on file    Number of children: Not on file    Years of education: Not on file    Highest education level: Not on file   Occupational History    Not on file   Tobacco Use    Smoking status: Never    Smokeless tobacco: Never   Substance and Sexual Activity    Alcohol use: Not Currently     Comment: NO    Drug use: Not on file    Sexual activity: Not on file   Other Topics Concern    Not on file   Social History Narrative    Not on file     Social Drivers of Health     Financial Resource Strain: Low Risk  (3/27/2025)    Financial  "Resource Strain     Within the past 12 months, have you or your family members you live with been unable to get utilities (heat, electricity) when it was really needed?: No   Food Insecurity: Low Risk  (3/27/2025)    Food Insecurity     Within the past 12 months, did you worry that your food would run out before you got money to buy more?: No     Within the past 12 months, did the food you bought just not last and you didn t have money to get more?: No   Transportation Needs: Low Risk  (3/27/2025)    Transportation Needs     Within the past 12 months, has lack of transportation kept you from medical appointments, getting your medicines, non-medical meetings or appointments, work, or from getting things that you need?: No   Physical Activity: Not on file   Stress: Not on file   Social Connections: Not on file   Interpersonal Safety: Not on file   Housing Stability: Low Risk  (3/27/2025)    Housing Stability     Do you have housing? : Yes     Are you worried about losing your housing?: No            Review of Systems:   Skin:        Eyes:       ENT:       Respiratory:  Positive for    Cardiovascular:  palpitations, edema, syncope or near-syncope, exercise intolerance Positive for, dizziness, heaviness, fatigue  Gastroenterology:      Genitourinary:       Musculoskeletal:  Positive for    Neurologic:  not assessed headaches  Psychiatric:       Heme/Lymph/Imm:  Positive for allergies  Endocrine:  Negative           Physical Exam:   Vitals: /79   Pulse 77   Ht 1.613 m (5' 3.5\")   Wt 59.9 kg (132 lb)   LMP  (LMP Unknown)   SpO2 98%   BMI 23.02 kg/m     Wt Readings from Last 4 Encounters:   05/01/25 59.9 kg (132 lb)   04/17/25 58.1 kg (128 lb)   04/03/25 59.6 kg (131 lb 4.8 oz)   03/27/25 59.4 kg (131 lb)     GEN: well nourished, in no acute distress.  HEENT:  Pupils equal, round. Sclerae nonicteric.   NECK: Supple, no masses appreciated. No JVD with patient supine.  C/V:  Regular rate and rhythm, no murmur, " rub or gallop.    RESP: Respirations are unlabored. Clear to auscultation bilaterally without wheezing, rales, or rhonchi.  GI: Abdomen soft, nontender.  EXTREM: No LE edema.  NEURO: Alert and oriented, cooperative.  SKIN: Warm and dry.        Data:     LIPID RESULTS:  Lab Results   Component Value Date    CHOL 175 03/06/2025    HDL 78 03/06/2025    LDL 88 03/06/2025    TRIG 47 03/06/2025    TRIG 50 01/19/2024     LIVER ENZYME RESULTS:  Lab Results   Component Value Date    AST 19 04/04/2025    ALT 16 04/04/2025     CBC RESULTS:  Lab Results   Component Value Date    WBC 6.5 04/17/2025    RBC 3.90 04/17/2025    HGB 11.3 (L) 04/17/2025    HCT 33.4 (L) 04/17/2025    MCV 86 04/17/2025    MCH 29.0 04/17/2025    MCHC 33.8 04/17/2025    RDW 13.7 04/17/2025     04/17/2025     BMP RESULTS:  Lab Results   Component Value Date     (L) 04/17/2025    POTASSIUM 4.2 04/17/2025    CHLORIDE 94 (L) 04/17/2025    CHLORIDE 92 (A) 03/18/2025    CO2 26 04/17/2025    ANIONGAP 11 04/17/2025     (H) 04/17/2025     (H) 03/29/2025    BUN 17.3 04/17/2025    CR 0.75 04/17/2025    GFRESTIMATED 80 04/17/2025    SHINE 9.0 04/17/2025      A1C RESULTS:  Lab Results   Component Value Date    A1C 5.6 12/30/2024     INR RESULTS:  Lab Results   Component Value Date    INR 1.0 09/13/2024            Medications     Current Outpatient Medications   Medication Sig Dispense Refill    albuterol (PROAIR HFA/PROVENTIL HFA/VENTOLIN HFA) 108 (90 Base) MCG/ACT inhaler Inhale 2 puffs into the lungs at bedtime. And Q6H as needed      apixaban ANTICOAGULANT (ELIQUIS) 2.5 MG tablet Take 2.5 mg by mouth 2 times daily.      atorvastatin (LIPITOR) 10 MG tablet Take 1 tablet (10 mg) by mouth daily. 90 tablet 3    budesonide (PULMICORT) 0.5 MG/2ML neb solution Take 0.5 mg by nebulization daily as needed (short of breath, wheezing).      calcium carbonate (OS-SHINE) 1500 (600 Ca) MG tablet Take 600 mg by mouth 2 times daily.      cetirizine (ZYRTEC)  10 MG tablet Take 10 mg by mouth every evening.      conjugated estrogens (PREMARIN) 0.625 MG/GM vaginal cream Place vaginally daily as needed.      docusate sodium (DSS) 100 MG capsule Take 1 capsule by mouth every evening.      fish oil-omega-3 fatty acids 1000 MG capsule Take 1 g by mouth 2 times daily.      fluticasone (FLONASE) 50 MCG/ACT nasal spray Spray 1 spray into both nostrils 2 times daily.      ipratropium (ATROVENT) 0.02 % neb solution Inhale 0.5 mg into the lungs every 6 hours as needed for wheezing.      isosorbide mononitrate (IMDUR) 30 MG 24 hr tablet Take 1 tablet (30 mg) by mouth daily. 90 tablet 0    ketoconazole (NIZORAL) 2 % external cream Apply topically daily as needed for itching or irritation. To feet      meclizine (ANTIVERT) 12.5 MG tablet Take 12.5 mg by mouth 3 times daily as needed for dizziness.      metoprolol succinate ER (TOPROL XL) 25 MG 24 hr tablet Take 1 tablet (25 mg) by mouth daily. 30 tablet 2    nitroGLYcerin (NITROSTAT) 0.4 MG sublingual tablet For chest pain place 1 tablet under the tongue every 5 minutes for 3 doses. If symptoms persist 5 minutes after 1st dose call 911.  Maximum 3 doses in 15 minutes. Notify provider if no relief after 3 doses. Do NOT give nitroGLYcerin SL if the patient has received sildenafil (VIAGRA/REVATIO), avanafil (STENDRA) or vardenafil (LEVITRA/STAXYN) within the last 24 hours, OR tadalafil (CIALIS/ADCIRCA) within the last 48 hours.  Inform provider if patient has taken one of those medications. 5 tablet 0    omeprazole (PRILOSEC) 20 MG DR capsule Take 20 mg by mouth daily as needed.      polyethylene glycol (MIRALAX) 17 g packet Take 1 packet by mouth daily as needed for constipation.      polyethylene glycol-propylene glycol (SYSTANE ULTRA) 0.4-0.3 % SOLN ophthalmic solution Place 1-2 drops into both eyes 4 times daily as needed for dry eyes.      sodium chloride (OCEAN) 0.65 % nasal spray Spray 1 spray into both nostrils 2 times daily.       torsemide (DEMADEX) 10 MG tablet Take 1 tablet (10 mg) by mouth daily. Hold for systolic blood pressure less than 100. 30 tablet 3    TRELEGY ELLIPTA 100-62.5-25 MCG/ACT oral inhaler Inhale 2 puffs into the lungs daily      valsartan (DIOVAN) 40 MG tablet Take 0.5 tablets (20 mg) by mouth every evening. Hold if systolic blood pressure less than 100. 45 tablet 1          Past Medical History     Past Medical History:   Diagnosis Date    Asthma     GERD (gastroesophageal reflux disease)     Heart failure with reduced ejection fraction (H)     Paroxysmal atrial fibrillation (H)     Stress-induced cardiomyopathy      Past Surgical History:   Procedure Laterality Date    CV CORONARY ANGIOGRAM N/A 3/7/2025    Procedure: Coronary Angiogram;  Surgeon: Darryl Chris MD;  Location:  [facility]    CV LEFT HEART CATH N/A 3/7/2025    Procedure: Left Heart Catheterization;  Surgeon: Darryl Chris MD;  Location: [facility] CARDIAC CATH LAB    CV LEFT VENTRICULOGRAM N/A 3/7/2025    Procedure: Left Ventriculogram;  Surgeon: Darryl Chris MD;  Location:  [facility]     Family History   Problem Relation Age of Onset    Heart Defect Mother     Myocardial Infarction Father             Allergies   Sulfa antibiotics, Diazepam, Meperidine, Morphine, Penicillins, and Zafirlukast        Mckenna Yuen NP  [facility]

## 2025-05-01 NOTE — LETTER
5/1/2025    Nadira Rob MD  [address], [address]  [place] [address]    RE: Margareth Hogue       Dear Colleague,     I had the pleasure of seeing Margareth Hogue in the [facility].    Cardiology Clinic Progress Note  Margareth Hogue MRN# [id number]   YOB: 1945 Age: 80 year old   Primary Cardiologist: Dr. Louis Reason for visit: Follow up ER visit             Assessment and Plan:       1.  Nonischemic cardiomyopathy, likely Takotsubo with recovered EF  - 3/7/25 Coronary angiogram: Showing no coronary artery disease, just minimal atherosclerosis in the LAD, LV with severe apical ballooning and sparing of the base  - Maintained on metoprolol, valsartan, and torsemide 10 mg daily  - 3/28/2020 5 repeat TTE showing improvement in LVEF to low normal, 50 to 55% and improvement in apical hypokinesis    2.  History of atrial fibrillation, s/p PVI/atrial flutter  - Anticoagulated with dose adjusted Eliquis for thromboembolic prophylaxis  - Rate controlled with metoprolol    3.  Essential hypertension well-controlled  -Reviewed log of home blood pressure readings which primarily are in the 110-120 range, only needed to hold valsartan and torsemide twice in the last 2 months    4.  Near syncope  -Occurred in the setting of new heart failure in March, had another episode prior to the first episode where she slipped and fell and was told not to drive, has been stable since that time without any recurrence of syncope    5.  Pulmonary sarcoid, stable    6.  Chest pain  - 2 emergency room visits, both of which demonstrated flat troponin trends without any ischemic EKG changes, 1 episode occurred shortly after discontinuation of scheduled torsemide    Lina is now stabilized from a cardiovascular standpoint without any symptoms suggestive of decompensated heart failure or angina.  She does appear to have a very narrow euvolemic window and becomes symptomatic even with a weight gain of only  about 3 pounds.  I encouraged her to continue to monitor her salt and check her weights daily contact us if she should be gaining any weight more than 2 pounds.  We may recommend she takes extra diuretics on those days.  For now I will leave her Imdur as she is doing very well and progressing in cardiac rehab, however eventually we may want to discontinue that considering she does not have any clinically significant coronary artery disease and look toward uptitrating her valsartan if needed.  I did reassure her that her most recent echocardiogram from late March did show recovery in her LVEF and I would expect this would be even more so on her next echocardiogram planned in 3 months time.  Will continue her current cardiac regimen and plan to repeat labs again in 3 months as well.  Thankfully on her recent lab work it showed stable hyponatremia on her current dose of torsemide.    Plan:  Continue apixaban 2.5 mg twice daily  Continue atorvastatin 10 mg daily  Continue Imdur 30 mg daily  Continue metoprolol XL 25 mg daily  Continue valsartan 20 mg daily, with hold parameters  Continue torsemide 10 mg daily, with hold parameters  Repeat BMP and echocardiogram in July    Follow up: Follow-up with me in July as scheduled        History of Presenting Illness:    Margareth Hogue is a very pleasant 80 year old female with a history of atrial fibrillation, s/p PVI, atrial flutter/tachycardia, dyslipidemia, renal cell carcinoma, s/p right nephrectomy, pulmonary sarcoidosis, GERD, TIA.    She was most recently seen by Dr. Louis in April of this year following a hospital admission for chest pain.  An echocardiogram done during that hospital stay showed an ejection fraction of 30 to 35%.  She did undergo coronary angiogram which showed no coronary artery disease.  She was diagnosed with a stress cardiomyopathy and initiated on appropriate medications including valsartan and metoprolol along with torsemide.   Her transthoracic  echo from 3/28/2025 showed an LVEF of 50 to 55% with mild apical wall hypokinesis and no significant valvular disease.    At their clinic visit, she continued to report episodes of chest pain requiring sublingual nitroglycerin.    She was seen in the emergency room 2 times this month with reports of chest pain. Her EKGs were non-ischemic and her torsemide was resumed daily. Troponins were trended and flat.     Patient is here today for a follow-up of her recent emergency room visits.  She presents with her  Torres today.    Patient reports feeling good. Monitoring weights daily a home, weighing 126-129#.  She says she has not had any further episodes of chest pain over the last few weeks and that cardiac rehab is going very well.  She is able to exert herself without any symptoms.  She did states she had 1 episode of chest pressure that lasted a few hours at home, but this was in the setting of weight gain of about 3 pounds and over the course of the day it got better as she diuresed.    Patient denies chest pain or chest tightness. Denies dizziness, lightheadedness or other presyncopal symptoms. Denies tachycardia or palpitations.  No issues with lower extremity edema.    Most recent labs from 4/17/2025 show sodium 131, potassium 4.2, BUN 17.3, creatinine 0.71, GFR 80, NT-proBNP 838, hemoglobin 11.3.     Blood pressure 116/79 and HR 77 in clinic today. Denies any bleeding issues with eliquis. She has been compliant with medications.         Recent Hospitalizations   As  above          Social History      Social History     Socioeconomic History     Marital status:      Spouse name: Not on file     Number of children: Not on file     Years of education: Not on file     Highest education level: Not on file   Occupational History     Not on file   Tobacco Use     Smoking status: Never     Smokeless tobacco: Never   Substance and Sexual Activity     Alcohol use: Not Currently     Comment: NO     Drug use:  "Not on file     Sexual activity: Not on file   Other Topics Concern     Not on file   Social History Narrative     Not on file     Social Drivers of Health     Financial Resource Strain: Low Risk  (3/27/2025)    Financial Resource Strain      Within the past 12 months, have you or your family members you live with been unable to get utilities (heat, electricity) when it was really needed?: No   Food Insecurity: Low Risk  (3/27/2025)    Food Insecurity      Within the past 12 months, did you worry that your food would run out before you got money to buy more?: No      Within the past 12 months, did the food you bought just not last and you didn t have money to get more?: No   Transportation Needs: Low Risk  (3/27/2025)    Transportation Needs      Within the past 12 months, has lack of transportation kept you from medical appointments, getting your medicines, non-medical meetings or appointments, work, or from getting things that you need?: No   Physical Activity: Not on file   Stress: Not on file   Social Connections: Not on file   Interpersonal Safety: Not on file   Housing Stability: Low Risk  (3/27/2025)    Housing Stability      Do you have housing? : Yes      Are you worried about losing your housing?: No            Review of Systems:   Skin:        Eyes:       ENT:       Respiratory:  Positive for    Cardiovascular:  palpitations, edema, syncope or near-syncope, exercise intolerance Positive for, dizziness, heaviness, fatigue  Gastroenterology:      Genitourinary:       Musculoskeletal:  Positive for    Neurologic:  not assessed headaches  Psychiatric:       Heme/Lymph/Imm:  Positive for allergies  Endocrine:  Negative           Physical Exam:   Vitals: /79   Pulse 77   Ht 1.613 m (5' 3.5\")   Wt 59.9 kg (132 lb)   LMP  (LMP Unknown)   SpO2 98%   BMI 23.02 kg/m     Wt Readings from Last 4 Encounters:   05/01/25 59.9 kg (132 lb)   04/17/25 58.1 kg (128 lb)   04/03/25 59.6 kg (131 lb 4.8 oz) "   03/27/25 59.4 kg (131 lb)     GEN: well nourished, in no acute distress.  HEENT:  Pupils equal, round. Sclerae nonicteric.   NECK: Supple, no masses appreciated. No JVD with patient supine.  C/V:  Regular rate and rhythm, no murmur, rub or gallop.    RESP: Respirations are unlabored. Clear to auscultation bilaterally without wheezing, rales, or rhonchi.  GI: Abdomen soft, nontender.  EXTREM: No LE edema.  NEURO: Alert and oriented, cooperative.  SKIN: Warm and dry.        Data:     LIPID RESULTS:  Lab Results   Component Value Date    CHOL 175 03/06/2025    HDL 78 03/06/2025    LDL 88 03/06/2025    TRIG 47 03/06/2025    TRIG 50 01/19/2024     LIVER ENZYME RESULTS:  Lab Results   Component Value Date    AST 19 04/04/2025    ALT 16 04/04/2025     CBC RESULTS:  Lab Results   Component Value Date    WBC 6.5 04/17/2025    RBC 3.90 04/17/2025    HGB 11.3 (L) 04/17/2025    HCT 33.4 (L) 04/17/2025    MCV 86 04/17/2025    MCH 29.0 04/17/2025    MCHC 33.8 04/17/2025    RDW 13.7 04/17/2025     04/17/2025     BMP RESULTS:  Lab Results   Component Value Date     (L) 04/17/2025    POTASSIUM 4.2 04/17/2025    CHLORIDE 94 (L) 04/17/2025    CHLORIDE 92 (A) 03/18/2025    CO2 26 04/17/2025    ANIONGAP 11 04/17/2025     (H) 04/17/2025     (H) 03/29/2025    BUN 17.3 04/17/2025    CR 0.75 04/17/2025    GFRESTIMATED 80 04/17/2025    SHINE 9.0 04/17/2025      A1C RESULTS:  Lab Results   Component Value Date    A1C 5.6 12/30/2024     INR RESULTS:  Lab Results   Component Value Date    INR 1.0 09/13/2024            Medications     Current Outpatient Medications   Medication Sig Dispense Refill     albuterol (PROAIR HFA/PROVENTIL HFA/VENTOLIN HFA) 108 (90 Base) MCG/ACT inhaler Inhale 2 puffs into the lungs at bedtime. And Q6H as needed       apixaban ANTICOAGULANT (ELIQUIS) 2.5 MG tablet Take 2.5 mg by mouth 2 times daily.       atorvastatin (LIPITOR) 10 MG tablet Take 1 tablet (10 mg) by mouth daily. 90 tablet 3      budesonide (PULMICORT) 0.5 MG/2ML neb solution Take 0.5 mg by nebulization daily as needed (short of breath, wheezing).       calcium carbonate (OS-SHINE) 1500 (600 Ca) MG tablet Take 600 mg by mouth 2 times daily.       cetirizine (ZYRTEC) 10 MG tablet Take 10 mg by mouth every evening.       conjugated estrogens (PREMARIN) 0.625 MG/GM vaginal cream Place vaginally daily as needed.       docusate sodium (DSS) 100 MG capsule Take 1 capsule by mouth every evening.       fish oil-omega-3 fatty acids 1000 MG capsule Take 1 g by mouth 2 times daily.       fluticasone (FLONASE) 50 MCG/ACT nasal spray Spray 1 spray into both nostrils 2 times daily.       ipratropium (ATROVENT) 0.02 % neb solution Inhale 0.5 mg into the lungs every 6 hours as needed for wheezing.       isosorbide mononitrate (IMDUR) 30 MG 24 hr tablet Take 1 tablet (30 mg) by mouth daily. 90 tablet 0     ketoconazole (NIZORAL) 2 % external cream Apply topically daily as needed for itching or irritation. To feet       meclizine (ANTIVERT) 12.5 MG tablet Take 12.5 mg by mouth 3 times daily as needed for dizziness.       metoprolol succinate ER (TOPROL XL) 25 MG 24 hr tablet Take 1 tablet (25 mg) by mouth daily. 30 tablet 2     nitroGLYcerin (NITROSTAT) 0.4 MG sublingual tablet For chest pain place 1 tablet under the tongue every 5 minutes for 3 doses. If symptoms persist 5 minutes after 1st dose call 911.  Maximum 3 doses in 15 minutes. Notify provider if no relief after 3 doses. Do NOT give nitroGLYcerin SL if the patient has received sildenafil (VIAGRA/REVATIO), avanafil (STENDRA) or vardenafil (LEVITRA/STAXYN) within the last 24 hours, OR tadalafil (CIALIS/ADCIRCA) within the last 48 hours.  Inform provider if patient has taken one of those medications. 5 tablet 0     omeprazole (PRILOSEC) 20 MG DR capsule Take 20 mg by mouth daily as needed.       polyethylene glycol (MIRALAX) 17 g packet Take 1 packet by mouth daily as needed for constipation.        polyethylene glycol-propylene glycol (SYSTANE ULTRA) 0.4-0.3 % SOLN ophthalmic solution Place 1-2 drops into both eyes 4 times daily as needed for dry eyes.       sodium chloride (OCEAN) 0.65 % nasal spray Spray 1 spray into both nostrils 2 times daily.       torsemide (DEMADEX) 10 MG tablet Take 1 tablet (10 mg) by mouth daily. Hold for systolic blood pressure less than 100. 30 tablet 3     TRELEGY ELLIPTA 100-62.5-25 MCG/ACT oral inhaler Inhale 2 puffs into the lungs daily       valsartan (DIOVAN) 40 MG tablet Take 0.5 tablets (20 mg) by mouth every evening. Hold if systolic blood pressure less than 100. 45 tablet 1          Past Medical History     Past Medical History:   Diagnosis Date     Asthma      GERD (gastroesophageal reflux disease)      Heart failure with reduced ejection fraction (H)      Paroxysmal atrial fibrillation (H)      Stress-induced cardiomyopathy      Past Surgical History:   Procedure Laterality Date     CV CORONARY ANGIOGRAM N/A 3/7/2025    Procedure: Coronary Angiogram;  Surgeon: Darryl Chris MD;  Location: [facility] CARDIAC CATH LAB     CV LEFT HEART CATH N/A 3/7/2025    Procedure: Left Heart Catheterization;  Surgeon: Darryl Chris MD;  Location: [facility] CARDIAC CATH LAB     CV LEFT VENTRICULOGRAM N/A 3/7/2025    Procedure: Left Ventriculogram;  Surgeon: Darryl Chris MD;  Location:  HEART CARDIAC CATH LAB     Family History   Problem Relation Age of Onset     Heart Defect Mother      Myocardial Infarction Father             Allergies   Sulfa antibiotics, Diazepam, Meperidine, Morphine, Penicillins, and Zafirlukast        Mckenna Yuen NP  [facility]       Thank you for allowing me to participate in the care of your patient.      Sincerely,     Mckenna Yuen NP     [facility]  cc:   Melo Louis MD  [address]  [place] [id number]

## 2025-05-01 NOTE — PATIENT INSTRUCTIONS
Today's Recommendations    Your echo showed low normal pumping function   Your labs from 4/17/25 looked good  We will recheck your heart ultrasound in July and labs   Continue all medications without changes.  Please follow up with me in July as scheduled.    Please send a [employer] message or call [phone] to the RN team with questions or concerns.     Scheduling number [phone]  KIRILL John, CNP

## 2025-05-02 ENCOUNTER — HOSPITAL ENCOUNTER (OUTPATIENT)
Dept: CARDIAC REHAB | Facility: CLINIC | Age: 80
Discharge: HOME | End: 2025-05-02
Attending: INTERNAL MEDICINE
Payer: COMMERCIAL

## 2025-05-02 PROCEDURE — 93798 PHYS/QHP OP CAR RHAB W/ECG: CPT | Performed by: REHABILITATION PRACTITIONER

## 2025-05-05 ENCOUNTER — HOSPITAL ENCOUNTER (OUTPATIENT)
Dept: CARDIAC REHAB | Facility: CLINIC | Age: 80
Discharge: HOME | End: 2025-05-05
Attending: INTERNAL MEDICINE
Payer: COMMERCIAL

## 2025-05-05 PROCEDURE — 93798 PHYS/QHP OP CAR RHAB W/ECG: CPT | Performed by: OCCUPATIONAL THERAPIST

## 2025-05-08 ENCOUNTER — TELEPHONE (OUTPATIENT)
Dept: PULMONOLOGY | Facility: CLINIC | Age: 80
End: 2025-05-08
Payer: COMMERCIAL

## 2025-05-08 NOTE — TELEPHONE ENCOUNTER
[facility] Call Center    Phone Message    May a detailed message be left on voicemail: yes     Reason for Call: Call received from pt reporting that she was advised by her cardiology team to schedule an appt with Dr. Perlman for the diagnosis of sarcoidosis. Detailed message left for ILD team to contact pt to schedule appt, pt will wait to hear back for now.    Action Taken: Other: Pulm    Travel Screening: Not Applicable

## 2025-05-09 ENCOUNTER — HOSPITAL ENCOUNTER (OUTPATIENT)
Dept: CARDIAC REHAB | Facility: CLINIC | Age: 80
Discharge: HOME | End: 2025-05-09
Attending: INTERNAL MEDICINE
Payer: COMMERCIAL

## 2025-05-09 PROCEDURE — 93798 PHYS/QHP OP CAR RHAB W/ECG: CPT

## 2025-05-09 NOTE — CONFIDENTIAL NOTE
Informed pt that [facility] does not take [employer] insurance. Pt states understanding and will seek out another provider.     Taylor Rizzo RN

## 2025-05-10 ENCOUNTER — ORDERS ONLY (AUTO-RELEASED) (OUTPATIENT)
Dept: CARDIAC REHAB | Facility: CLINIC | Age: 80
End: 2025-05-10
Payer: COMMERCIAL

## 2025-05-10 DIAGNOSIS — I47.10 SVT (SUPRAVENTRICULAR TACHYCARDIA): ICD-10-CM

## 2025-05-12 ENCOUNTER — HOSPITAL ENCOUNTER (OUTPATIENT)
Dept: CARDIAC REHAB | Facility: CLINIC | Age: 80
Discharge: HOME | End: 2025-05-12
Attending: INTERNAL MEDICINE
Payer: COMMERCIAL

## 2025-05-12 PROCEDURE — 93798 PHYS/QHP OP CAR RHAB W/ECG: CPT

## 2025-05-14 ENCOUNTER — HOSPITAL ENCOUNTER (OUTPATIENT)
Dept: CARDIAC REHAB | Facility: CLINIC | Age: 80
Discharge: HOME | End: 2025-05-14
Attending: INTERNAL MEDICINE
Payer: COMMERCIAL

## 2025-05-14 PROCEDURE — 93798 PHYS/QHP OP CAR RHAB W/ECG: CPT

## 2025-05-16 ENCOUNTER — HOSPITAL ENCOUNTER (EMERGENCY)
Facility: CLINIC | Age: 80
Discharge: HOME | End: 2025-05-16
Attending: EMERGENCY MEDICINE | Admitting: EMERGENCY MEDICINE
Payer: COMMERCIAL

## 2025-05-16 ENCOUNTER — APPOINTMENT (OUTPATIENT)
Dept: GENERAL RADIOLOGY | Facility: CLINIC | Age: 80
End: 2025-05-16
Attending: EMERGENCY MEDICINE
Payer: COMMERCIAL

## 2025-05-16 ENCOUNTER — HOSPITAL ENCOUNTER (OUTPATIENT)
Dept: CARDIAC REHAB | Facility: CLINIC | Age: 80
Discharge: HOME | End: 2025-05-16
Attending: INTERNAL MEDICINE
Payer: COMMERCIAL

## 2025-05-16 VITALS
DIASTOLIC BLOOD PRESSURE: 62 MMHG | HEART RATE: 74 BPM | BODY MASS INDEX: 23.78 KG/M2 | TEMPERATURE: 98.6 F | OXYGEN SATURATION: 95 % | SYSTOLIC BLOOD PRESSURE: 116 MMHG | HEIGHT: 63 IN | WEIGHT: 134.2 LBS | RESPIRATION RATE: 16 BRPM

## 2025-05-16 DIAGNOSIS — E87.1 HYPONATREMIA: ICD-10-CM

## 2025-05-16 DIAGNOSIS — R07.9 CHEST PAIN, UNSPECIFIED TYPE: ICD-10-CM

## 2025-05-16 LAB
ANION GAP SERPL CALCULATED.3IONS-SCNC: 10 MMOL/L (ref 7–15)
BASOPHILS # BLD AUTO: 0 10E3/UL (ref 0–0.2)
BASOPHILS NFR BLD AUTO: 0 %
BUN SERPL-MCNC: 21 MG/DL (ref 8–23)
CALCIUM SERPL-MCNC: 9.1 MG/DL (ref 8.8–10.4)
CHLORIDE SERPL-SCNC: 96 MMOL/L (ref 98–107)
CREAT SERPL-MCNC: 0.76 MG/DL (ref 0.51–0.95)
EGFRCR SERPLBLD CKD-EPI 2021: 78 ML/MIN/1.73M2
EOSINOPHIL # BLD AUTO: 0.2 10E3/UL (ref 0–0.7)
EOSINOPHIL NFR BLD AUTO: 2 %
ERYTHROCYTE [DISTWIDTH] IN BLOOD BY AUTOMATED COUNT: 13.8 % (ref 10–15)
GLUCOSE SERPL-MCNC: 111 MG/DL (ref 70–99)
HCO3 SERPL-SCNC: 21 MMOL/L (ref 22–29)
HCT VFR BLD AUTO: 34.9 % (ref 35–47)
HGB BLD-MCNC: 11.6 G/DL (ref 11.7–15.7)
IMM GRANULOCYTES # BLD: 0 10E3/UL
IMM GRANULOCYTES NFR BLD: 1 %
LYMPHOCYTES # BLD AUTO: 1.5 10E3/UL (ref 0.8–5.3)
LYMPHOCYTES NFR BLD AUTO: 21 %
MCH RBC QN AUTO: 28.4 PG (ref 26.5–33)
MCHC RBC AUTO-ENTMCNC: 33.2 G/DL (ref 31.5–36.5)
MCV RBC AUTO: 86 FL (ref 78–100)
MONOCYTES # BLD AUTO: 0.7 10E3/UL (ref 0–1.3)
MONOCYTES NFR BLD AUTO: 9 %
NEUTROPHILS # BLD AUTO: 4.7 10E3/UL (ref 1.6–8.3)
NEUTROPHILS NFR BLD AUTO: 66 %
NRBC # BLD AUTO: 0 10E3/UL
NRBC BLD AUTO-RTO: 0 /100
NT-PROBNP SERPL-MCNC: 444 PG/ML (ref 0–624)
PLATELET # BLD AUTO: 279 10E3/UL (ref 150–450)
POTASSIUM SERPL-SCNC: 4 MMOL/L (ref 3.4–5.3)
RBC # BLD AUTO: 4.08 10E6/UL (ref 3.8–5.2)
SODIUM SERPL-SCNC: 127 MMOL/L (ref 135–145)
TROPONIN T SERPL HS-MCNC: 23 NG/L
TROPONIN T SERPL HS-MCNC: 23 NG/L
WBC # BLD AUTO: 7.1 10E3/UL (ref 4–11)

## 2025-05-16 PROCEDURE — 93005 ELECTROCARDIOGRAM TRACING: CPT

## 2025-05-16 PROCEDURE — 99285 EMERGENCY DEPT VISIT HI MDM: CPT | Mod: 25

## 2025-05-16 PROCEDURE — 83880 ASSAY OF NATRIURETIC PEPTIDE: CPT | Performed by: EMERGENCY MEDICINE

## 2025-05-16 PROCEDURE — 84484 ASSAY OF TROPONIN QUANT: CPT | Performed by: EMERGENCY MEDICINE

## 2025-05-16 PROCEDURE — 36415 COLL VENOUS BLD VENIPUNCTURE: CPT | Performed by: EMERGENCY MEDICINE

## 2025-05-16 PROCEDURE — 84295 ASSAY OF SERUM SODIUM: CPT | Performed by: EMERGENCY MEDICINE

## 2025-05-16 PROCEDURE — 85004 AUTOMATED DIFF WBC COUNT: CPT | Performed by: EMERGENCY MEDICINE

## 2025-05-16 PROCEDURE — 85025 COMPLETE CBC W/AUTO DIFF WBC: CPT | Performed by: EMERGENCY MEDICINE

## 2025-05-16 PROCEDURE — 250N000013 HC RX MED GY IP 250 OP 250 PS 637: Performed by: EMERGENCY MEDICINE

## 2025-05-16 PROCEDURE — 71046 X-RAY EXAM CHEST 2 VIEWS: CPT

## 2025-05-16 PROCEDURE — 93798 PHYS/QHP OP CAR RHAB W/ECG: CPT | Performed by: CLINICAL EXERCISE PHYSIOLOGIST

## 2025-05-16 RX ORDER — ACETAMINOPHEN 500 MG
1000 TABLET ORAL ONCE
Status: COMPLETED | OUTPATIENT
Start: 2025-05-16 | End: 2025-05-16

## 2025-05-16 RX ADMIN — ACETAMINOPHEN 1000 MG: 500 TABLET, FILM COATED ORAL at 21:45

## 2025-05-16 ASSESSMENT — COLUMBIA-SUICIDE SEVERITY RATING SCALE - C-SSRS
6. HAVE YOU EVER DONE ANYTHING, STARTED TO DO ANYTHING, OR PREPARED TO DO ANYTHING TO END YOUR LIFE?: NO
1. IN THE PAST MONTH, HAVE YOU WISHED YOU WERE DEAD OR WISHED YOU COULD GO TO SLEEP AND NOT WAKE UP?: NO
2. HAVE YOU ACTUALLY HAD ANY THOUGHTS OF KILLING YOURSELF IN THE PAST MONTH?: NO

## 2025-05-16 ASSESSMENT — ACTIVITIES OF DAILY LIVING (ADL)
ADLS_ACUITY_SCORE: 59

## 2025-05-16 NOTE — ED TRIAGE NOTES
Pt arrives via triage presenting with mid sternal chest pressure radiating into back. Pt had 3 nitroglycerin throughout the day with no relief.      Triage Assessment (Adult)       Row Name 05/16/25 1750          Triage Assessment    Airway WDL WDL        Cardiac WDL    Cardiac WDL chest pain        Chest Pain Assessment    Chest Pain Location midsternal     Chest Pain Radiation arm;back     Character pressure     Chest Pain Intervention 12-lead ECG obtained;cardiac biomarkers drawn        Peripheral/Neurovascular WDL    Peripheral Neurovascular WDL WDL        Cognitive/Neuro/Behavioral WDL    Cognitive/Neuro/Behavioral WDL WDL

## 2025-05-16 NOTE — ED PROVIDER NOTES
Emergency Department Note      History of Present Illness     Chief Complaint   Chest Pain      HPI   Margareth Hogue is a 80 year old female with history of NSTEMI, HFrEF, PAF on Eliquis, stress-induced cardiomyopathy, hypertension, hyperlipidemia, and renal cell carcinoma s/p right nephrectomy who presents to the ED for evaluation of chest pain. Margareth reports onset of midsternal chest pain today at 1100 which she describes as pressure. The pain radiates into her back. She took 1 nitroglycerin which improved but did not alleviate her pain. Her pain exacerbated again this afternoon, prompting her ED visit. She currently has 3/10 pain. Her pain does not change with position or taking a deep breath. She also endorses shortness of breath. Margareth states her current symptoms are similar but more severe compared to past episodes of chest pain. Margareth denies fever, cough, or abdominal pain. No recent travel. Of note this patient has had this type of pain recurrently since her hospitalization in March.  This is her third visit to the emergency department after her hospitalization with reassuring workups and she followed up with cardiology at the beginning of this month.    Independent Historian   None    Review of External Notes   I reviewed 5/1/25 cardiology note. History of Takotsubo, hypertension, atrial fibrillation on Eliquis, pulmonary sarcoid.  Angiogram 3/7/25 no CAD. LV had severe apical ballooning. EF was 35%.    Past Medical History     Medical History and Problem List   Asthma  GERD  HFrEF  PAF  Stress-induced cardiomyopathy  Chronic insomnia  Hyperlipidemia  Hypertension  Renal cell carcinoma  IBS  TIA  Sarcoidosis, lung  NSTEMI  Osteoarthritis  Adenomatous polyp of colon    Medications   Atorvastatin  Isosorbide mononitrate  Metoprolol succinate ER  Torsemide  Valsartan  Apixaban    Surgical History   Coronary angiogram  Left heart cath  Left ventriculogram  Atrial ablation  Bilateral shoulder  "arthroplasty  Bilateral foot surgery  Tonsillectomy  Appendectomy  Small intestine surgery  Right nephrectomy    Physical Exam     Patient Vitals for the past 24 hrs:   BP Temp Temp src Pulse Resp SpO2 Height Weight   05/16/25 2146 116/62 -- -- 74 16 95 % -- --   05/16/25 1922 119/60 -- -- 77 10 96 % -- --   05/16/25 1743 116/70 98.6  F (37  C) Temporal 86 18 96 % 1.6 m (5' 3\") 60.9 kg (134 lb 3.2 oz)     Physical Exam    Physical Exam   Constitutional:  Patient is oriented to person, place, and time. They appear well-developed and well-nourished.  HENT:   Mouth/Throat:   Oropharynx is clear and moist.   Eyes:    Conjunctivae normal and EOM are normal. Pupils are equal, round, and reactive to light.   Neck:    Normal range of motion.   Cardiovascular: Normal rate, regular rhythm and normal heart sounds.  Exam reveals no gallop and no friction rub.  No murmur heard.  Pulmonary/Chest:  Effort normal and breath sounds normal. Patient has no wheezes. Patient has no rales. No pleuritic pain.  Abdominal:   Soft. Bowel sounds are normal. Patient exhibits no mass. There is no tenderness. There is no rebound and no guarding.   Musculoskeletal:  Normal range of motion. Trace pedal edema.  Neurological:   Patient is alert and oriented to person, place, and time. Patient has normal strength. No cranial nerve deficit or sensory deficit. GCS 15  Skin:   Skin is warm and dry. No rash noted. No erythema.   Psychiatric:   Patient has a normal mood and affect. Patient's behavior is normal. Judgment and thought content normal.    Diagnostics     Lab Results   Labs Ordered and Resulted from Time of ED Arrival to Time of ED Departure   TROPONIN T, HIGH SENSITIVITY - Abnormal       Result Value    Troponin T, High Sensitivity 23 (*)    BASIC METABOLIC PANEL - Abnormal    Sodium 127 (*)     Potassium 4.0      Chloride 96 (*)     Carbon Dioxide (CO2) 21 (*)     Anion Gap 10      Urea Nitrogen 21.0      Creatinine 0.76      GFR Estimate 78  "     Calcium 9.1      Glucose 111 (*)    CBC WITH PLATELETS AND DIFFERENTIAL - Abnormal    WBC Count 7.1      RBC Count 4.08      Hemoglobin 11.6 (*)     Hematocrit 34.9 (*)     MCV 86      MCH 28.4      MCHC 33.2      RDW 13.8      Platelet Count 279      % Neutrophils 66      % Lymphocytes 21      % Monocytes 9      % Eosinophils 2      % Basophils 0      % Immature Granulocytes 1      NRBCs per 100 WBC 0      Absolute Neutrophils 4.7      Absolute Lymphocytes 1.5      Absolute Monocytes 0.7      Absolute Eosinophils 0.2      Absolute Basophils 0.0      Absolute Immature Granulocytes 0.0      Absolute NRBCs 0.0     TROPONIN T, HIGH SENSITIVITY - Abnormal    Troponin T, High Sensitivity 23 (*)    NT-PROBNP - Normal    NT-proBNP 444         Imaging   XR Chest 2 Views   Final Result   IMPRESSION: Lungs are clear. No pleural effusion or pneumothorax. Normal heart size. Mediastinal calcifications compatible with prior granulomas process. Status post shoulder replacement bilaterally.          EKG   ECG taken at 1747, ECG read at 1820  Normal sinus rhythm  Normal ECG   No significant change as compared to prior, dated 4/17/2025.  Rate 82 bpm. WI interval 174 ms. QRS duration 104 ms. QT/QTc 394/460 ms. P-R-T axes 66 0 66.    Independent Interpretation   CXR: No pneumothorax.    ED Course      Medications Administered   Medications   acetaminophen (TYLENOL) tablet 1,000 mg (1,000 mg Oral $Given 5/16/25 214)       Procedures   Procedures     Discussion of Management   None    ED Course   ED Course as of 05/16/25 8421   Fri May 16, 2025   6354 I obtained history and examined the patient as noted above.        Additional Documentation  None    Medical Decision Making / Diagnosis     CMS Diagnoses: None    MIPS   None               [facility]   Margareth Hogue is a 80 year year old female who presents to the emergency department with recurrent chest pain.  She did have stress-induced cardiomyopathy earlier this March had a reduced EF  into the 30%'s but did recover on repeat echo.  She had an angiogram which did not show any evidence of coronary artery disease.  She has been compliant on her Eliquis she denies any infectious symptoms.  EKG shows her to be in a sinus rhythm she is not in A-fib she has normal vital signs.  Her delta troponins are detectable but they are at her baseline.  Chest x-ray does not show any acute effusion pneumothorax infiltrate abnormal mediastinum she does have granulomas which were previously seen.  Her BNP is normal she has got mild anemia but this is not acute she has no evidence of leukocytosis.  She does have hyponatremia which we discussed and she is vacillated between 120 and low 130s so again not outside of her normal [place].    I think given her recent angiogram recent echocardiogram recent cardiology follow-up and the findings today that this is likely not an acute cardiac event.  I did discuss with the patient at length as well as her  the frustrating nature of the the recurrent chest pain that she gets and coming to the emergency department and not having clear answers.  I feel at this point that we have ruled out A-fib with RVR and NSTEMI CHF her symptoms were not concerning for dissection and she is not hypoxic or tachycardic and she is anticoagulated highly unlikely to be PE.  At this time with her pain resolved I do feel she is safe to be discharged she will contact her cardiologist to determine whether any further repeat test need to be done.  In the meantime I did discuss with her if she experiences recurrent pain that is more severe or longer induration or atypical that she needs to return to the emergency department.    Disposition   The patient was discharged.     Diagnosis     ICD-10-CM    1. Hyponatremia  E87.1       2. Chest pain, unspecified type  R07.9            Discharge Medications   Discharge Medication List as of 5/16/2025 10:24 PM            Scribe Disclosure:  Sherri CRUZ  Alison, am serving as a scribe at 6:38 PM on 5/16/2025 to document services personally performed by Bianca Cerda MD based on my observations and the provider's statements to me.        Bianca Cerda MD  05/17/25 3407

## 2025-05-18 ENCOUNTER — HOSPITAL ENCOUNTER (EMERGENCY)
Facility: CLINIC | Age: 80
Discharge: HOME | End: 2025-05-19
Attending: EMERGENCY MEDICINE
Payer: COMMERCIAL

## 2025-05-18 DIAGNOSIS — R07.9 CHEST PAIN, UNSPECIFIED TYPE: ICD-10-CM

## 2025-05-18 DIAGNOSIS — R91.8 PULMONARY NODULES: ICD-10-CM

## 2025-05-18 LAB
BASOPHILS # BLD AUTO: 0 10E3/UL (ref 0–0.2)
BASOPHILS NFR BLD AUTO: 1 %
EOSINOPHIL # BLD AUTO: 0.2 10E3/UL (ref 0–0.7)
EOSINOPHIL NFR BLD AUTO: 3 %
ERYTHROCYTE [DISTWIDTH] IN BLOOD BY AUTOMATED COUNT: 13.7 % (ref 10–15)
HCT VFR BLD AUTO: 31.4 % (ref 35–47)
HGB BLD-MCNC: 10.5 G/DL (ref 11.7–15.7)
IMM GRANULOCYTES # BLD: 0 10E3/UL
IMM GRANULOCYTES NFR BLD: 1 %
LYMPHOCYTES # BLD AUTO: 2.1 10E3/UL (ref 0.8–5.3)
LYMPHOCYTES NFR BLD AUTO: 34 %
MCH RBC QN AUTO: 28.5 PG (ref 26.5–33)
MCHC RBC AUTO-ENTMCNC: 33.4 G/DL (ref 31.5–36.5)
MCV RBC AUTO: 85 FL (ref 78–100)
MONOCYTES # BLD AUTO: 0.7 10E3/UL (ref 0–1.3)
MONOCYTES NFR BLD AUTO: 12 %
NEUTROPHILS # BLD AUTO: 3.2 10E3/UL (ref 1.6–8.3)
NEUTROPHILS NFR BLD AUTO: 50 %
NRBC # BLD AUTO: 0 10E3/UL
NRBC BLD AUTO-RTO: 0 /100
PLATELET # BLD AUTO: 310 10E3/UL (ref 150–450)
RBC # BLD AUTO: 3.68 10E6/UL (ref 3.8–5.2)
WBC # BLD AUTO: 6.3 10E3/UL (ref 4–11)

## 2025-05-18 PROCEDURE — 82374 ASSAY BLOOD CARBON DIOXIDE: CPT | Performed by: EMERGENCY MEDICINE

## 2025-05-18 PROCEDURE — 83690 ASSAY OF LIPASE: CPT | Performed by: EMERGENCY MEDICINE

## 2025-05-18 PROCEDURE — 93005 ELECTROCARDIOGRAM TRACING: CPT

## 2025-05-18 PROCEDURE — 99285 EMERGENCY DEPT VISIT HI MDM: CPT | Mod: 25

## 2025-05-18 PROCEDURE — 85025 COMPLETE CBC W/AUTO DIFF WBC: CPT | Performed by: EMERGENCY MEDICINE

## 2025-05-18 PROCEDURE — 84484 ASSAY OF TROPONIN QUANT: CPT | Performed by: EMERGENCY MEDICINE

## 2025-05-18 PROCEDURE — 36415 COLL VENOUS BLD VENIPUNCTURE: CPT | Performed by: EMERGENCY MEDICINE

## 2025-05-18 RX ORDER — FENTANYL CITRATE 50 UG/ML
25 INJECTION, SOLUTION INTRAMUSCULAR; INTRAVENOUS ONCE
Status: DISCONTINUED | OUTPATIENT
Start: 2025-05-18 | End: 2025-05-19 | Stop reason: HOSPADM

## 2025-05-19 ENCOUNTER — APPOINTMENT (OUTPATIENT)
Dept: CT IMAGING | Facility: CLINIC | Age: 80
End: 2025-05-19
Attending: EMERGENCY MEDICINE
Payer: COMMERCIAL

## 2025-05-19 VITALS
BODY MASS INDEX: 23.92 KG/M2 | HEART RATE: 70 BPM | DIASTOLIC BLOOD PRESSURE: 69 MMHG | RESPIRATION RATE: 13 BRPM | SYSTOLIC BLOOD PRESSURE: 131 MMHG | WEIGHT: 135 LBS | OXYGEN SATURATION: 98 % | HEIGHT: 63 IN | TEMPERATURE: 97.7 F

## 2025-05-19 LAB
ALBUMIN SERPL BCG-MCNC: 3.9 G/DL (ref 3.5–5.2)
ALP SERPL-CCNC: 79 U/L (ref 40–150)
ALT SERPL W P-5'-P-CCNC: 18 U/L (ref 0–50)
ANION GAP SERPL CALCULATED.3IONS-SCNC: 11 MMOL/L (ref 7–15)
AST SERPL W P-5'-P-CCNC: 19 U/L (ref 0–45)
ATRIAL RATE - MUSE: 73 BPM
BILIRUB SERPL-MCNC: 0.2 MG/DL
BUN SERPL-MCNC: 23.5 MG/DL (ref 8–23)
CALCIUM SERPL-MCNC: 9.2 MG/DL (ref 8.8–10.4)
CHLORIDE SERPL-SCNC: 97 MMOL/L (ref 98–107)
CREAT SERPL-MCNC: 0.78 MG/DL (ref 0.51–0.95)
DIASTOLIC BLOOD PRESSURE - MUSE: NORMAL MMHG
EGFRCR SERPLBLD CKD-EPI 2021: 76 ML/MIN/1.73M2
GLUCOSE SERPL-MCNC: 91 MG/DL (ref 70–99)
HCO3 SERPL-SCNC: 24 MMOL/L (ref 22–29)
HOLD SPECIMEN: NORMAL
INTERPRETATION ECG - MUSE: NORMAL
LIPASE SERPL-CCNC: 29 U/L (ref 13–60)
P AXIS - MUSE: 61 DEGREES
POTASSIUM SERPL-SCNC: 4.3 MMOL/L (ref 3.4–5.3)
PR INTERVAL - MUSE: 192 MS
PROT SERPL-MCNC: 6.4 G/DL (ref 6.4–8.3)
QRS DURATION - MUSE: 102 MS
QT - MUSE: 400 MS
QTC - MUSE: 440 MS
R AXIS - MUSE: -5 DEGREES
SODIUM SERPL-SCNC: 132 MMOL/L (ref 135–145)
SYSTOLIC BLOOD PRESSURE - MUSE: NORMAL MMHG
T AXIS - MUSE: 50 DEGREES
TROPONIN T SERPL HS-MCNC: 24 NG/L
TROPONIN T SERPL HS-MCNC: 27 NG/L
VENTRICULAR RATE- MUSE: 73 BPM

## 2025-05-19 PROCEDURE — 71275 CT ANGIOGRAPHY CHEST: CPT

## 2025-05-19 PROCEDURE — 96360 HYDRATION IV INFUSION INIT: CPT | Mod: 59

## 2025-05-19 PROCEDURE — 93005 ELECTROCARDIOGRAM TRACING: CPT | Mod: 76

## 2025-05-19 PROCEDURE — 70450 CT HEAD/BRAIN W/O DYE: CPT

## 2025-05-19 PROCEDURE — 258N000003 HC RX IP 258 OP 636: Performed by: EMERGENCY MEDICINE

## 2025-05-19 PROCEDURE — 250N000011 HC RX IP 250 OP 636: Performed by: EMERGENCY MEDICINE

## 2025-05-19 PROCEDURE — 36415 COLL VENOUS BLD VENIPUNCTURE: CPT | Performed by: EMERGENCY MEDICINE

## 2025-05-19 PROCEDURE — 250N000009 HC RX 250: Performed by: EMERGENCY MEDICINE

## 2025-05-19 PROCEDURE — 96361 HYDRATE IV INFUSION ADD-ON: CPT

## 2025-05-19 PROCEDURE — 250N000013 HC RX MED GY IP 250 OP 250 PS 637: Performed by: EMERGENCY MEDICINE

## 2025-05-19 PROCEDURE — 84484 ASSAY OF TROPONIN QUANT: CPT | Performed by: EMERGENCY MEDICINE

## 2025-05-19 RX ORDER — ACETAMINOPHEN 500 MG
1000 TABLET ORAL ONCE
Status: COMPLETED | OUTPATIENT
Start: 2025-05-19 | End: 2025-05-19

## 2025-05-19 RX ORDER — IOPAMIDOL 755 MG/ML
68 INJECTION, SOLUTION INTRAVASCULAR ONCE
Status: COMPLETED | OUTPATIENT
Start: 2025-05-19 | End: 2025-05-19

## 2025-05-19 RX ADMIN — SODIUM CHLORIDE 500 ML: 0.9 INJECTION, SOLUTION INTRAVENOUS at 01:41

## 2025-05-19 RX ADMIN — IOPAMIDOL 68 ML: 755 INJECTION, SOLUTION INTRAVENOUS at 00:11

## 2025-05-19 RX ADMIN — ACETAMINOPHEN 1000 MG: 500 TABLET ORAL at 00:44

## 2025-05-19 RX ADMIN — SODIUM CHLORIDE 80 ML: 9 INJECTION, SOLUTION INTRAVENOUS at 00:11

## 2025-05-19 ASSESSMENT — ACTIVITIES OF DAILY LIVING (ADL)
ADLS_ACUITY_SCORE: 59

## 2025-05-19 NOTE — ED TRIAGE NOTES
BIBA for chest pain.  Given 1 nitroglycerin and 324 aspirin. During triage, patient had 2 episodes where she went unresponsive for 10-15 seconds.  First episode required sternal rub and she gasped.  Second episode patient gasped and became responsive.  Patient did not have memory of episodes.     Triage Assessment (Adult)       Row Name 05/18/25 2343          Triage Assessment    Airway WDL WDL        Respiratory WDL    Respiratory WDL WDL        Skin Circulation/Temperature WDL    Skin Circulation/Temperature WDL WDL        Cardiac WDL    Cardiac WDL X;chest pain        Peripheral/Neurovascular WDL    Peripheral Neurovascular WDL WDL        Cognitive/Neuro/Behavioral WDL    Cognitive/Neuro/Behavioral WDL WDL

## 2025-05-19 NOTE — ED PROVIDER NOTES
Emergency Department Note      History of Present Illness     Chief Complaint   Chest Pain    HPI   Margareth Hogue is a 80 year old female on Eliquis with a history of atrial fibrillation and HFrEF presenting to the ED for evaluation of chest pain. The patient reports that she developed a sharp pain across her chest while getting ready for bed tonight, which she described as more intense than the pain she was evaluated for two days ago. EMS administered 1 dose of nitroglycerin and 324 mg aspirin. Nitroglycerin does not help her pain. On evaluation, she also endorses a headache which only started after the nitroglycerin given. Feels short of breath. No abdominal pain, nausea, vomiting, diarrhea, or cough.     Independent Historian   None    Review of External Notes   I reviewed the cardiology visit from 5/1/25. Patient has nonischemic cardiomyopathy, likely Takotsubo with recovered EF.     Past Medical History     Medical History and Problem List   Asthma  GERD (gastroesophageal reflux disease)  Heart failure with reduced ejection fraction   Stress-induced cardiomyopathy  Acute hyponatremia  Allergic conjunctivitis of both eyes  Bilateral sensorineural hearing loss  Chronic insomnia  Colon polyp  Renal cell carcinoma  Hyperlipidemia  Primary osteoarthritis involving multiple joints  Sarcoidosis, lung  Seasonal allergies  Transient ischemic attack (TIA)  Abnormal finding on MRI of brain  NSTEMI (non-ST elevated myocardial infarction)   Atrial fibrillation with RVR   Elevated serum creatinine  Takotsubo cardiomyopathy    Medications   Eliquis  Lipitor  Os-nimisha  Zyrtec  Imdur  Antivert  Toprol XL  Prilosec  Demadex   Diovan     Surgical History   Coronary angiogram  Left heart catheterization   Left ventriculogram     Physical Exam     Patient Vitals for the past 24 hrs:   BP Temp Temp src Pulse Resp SpO2 Height Weight   05/19/25 0430 131/69 -- -- 70 13 -- -- --   05/19/25 0343 131/76 -- -- 70 14 98 % -- --   05/19/25  "0300 (!) 146/79 -- -- 71 12 98 % -- --   05/19/25 0130 (!) 148/88 -- -- 75 16 98 % -- --   05/19/25 0115 (!) 144/78 -- -- 75 20 99 % -- --   05/19/25 0100 (!) 146/105 -- -- 75 10 100 % -- --   05/19/25 0045 (!) 164/83 -- -- 76 (!) 7 100 % -- --   05/19/25 0000 (!) 163/95 -- -- 68 14 99 % -- --   05/18/25 2349 -- -- -- -- -- -- 1.6 m (5' 3\") 61.2 kg (135 lb)   05/18/25 2347 -- 97.7  F (36.5  C) Oral -- -- -- -- --   05/18/25 2330 (!) 141/78 -- -- 70 (!) 33 99 % -- --     Physical Exam  General: Sitting up in bed  Eyes:  The pupils are equal and round    Conjunctivae and sclerae are normal  ENT:    Atraumatic face  Neck:  Normal range of motion  CV:  Regular rate, regular rhythm     Skin warm and well perfused   Resp:  Non labored breathing on room air    No tachypnea    No cough heard    Lungs clear bilaterally  GI:  Abdomen is soft, there is no rigidity    No distension    No rebound tenderness     No abdominal tenderness  MS:  Normal muscular tone  Skin:  No rash or acute skin lesions noted  Neuro:   Awake, alert.      Speech is normal and fluent.    Face is symmetric.     Moves all extremities equally    SILT on bilateral UE/LE  Psych: Appears anxious    Diagnostics     Lab Results   Labs Ordered and Resulted from Time of ED Arrival to Time of ED Departure   COMPREHENSIVE METABOLIC PANEL - Abnormal       Result Value    Sodium 132 (*)     Potassium 4.3      Carbon Dioxide (CO2) 24      Anion Gap 11      Urea Nitrogen 23.5 (*)     Creatinine 0.78      GFR Estimate 76      Calcium 9.2      Chloride 97 (*)     Glucose 91      Alkaline Phosphatase 79      AST 19      ALT 18      Protein Total 6.4      Albumin 3.9      Bilirubin Total 0.2     TROPONIN T, HIGH SENSITIVITY - Abnormal    Troponin T, High Sensitivity 24 (*)    CBC WITH PLATELETS AND DIFFERENTIAL - Abnormal    WBC Count 6.3      RBC Count 3.68 (*)     Hemoglobin 10.5 (*)     Hematocrit 31.4 (*)     MCV 85      MCH 28.5      MCHC 33.4      RDW 13.7      " Platelet Count 310      % Neutrophils 50      % Lymphocytes 34      % Monocytes 12      % Eosinophils 3      % Basophils 1      % Immature Granulocytes 1      NRBCs per 100 WBC 0      Absolute Neutrophils 3.2      Absolute Lymphocytes 2.1      Absolute Monocytes 0.7      Absolute Eosinophils 0.2      Absolute Basophils 0.0      Absolute Immature Granulocytes 0.0      Absolute NRBCs 0.0     TROPONIN T, HIGH SENSITIVITY - Abnormal    Troponin T, High Sensitivity 27 (*)    LIPASE - Normal    Lipase 29       Imaging   CT Chest PE Abdomen Pelvis w Contrast   Final Result   IMPRESSION:   1.  Negative for pulmonary embolism.      2.  Subpleural scarring and/or atelectasis in the periphery of the lungs. No evidence for pneumonitis.      3.  Few technically indeterminate pulmonary nodules measuring up to 6 mm are unchanged. Recommend additional follow-up chest CT in 6 months to monitor for longer term stability. No new or enlarging nodules.      4.  Evidence of prior granulomatous disease.      5.  Moderate to severe coronary artery calcification.      6.  No acute findings in the abdomen or pelvis.      7.  Right nephrectomy.      8.  Large amount of stool in the colon.      CT Head w/o Contrast   Final Result   IMPRESSION:   1.  No acute intracranial process.      Report per radiology.     EKG #1   ECG taken at 2333, ECG read at 2336  Normal sinus rhythm  Incomplete right bundle branch block   Septal infarct, age undetermined    No significant change as compared to prior, dated 5/16/25.  Rate 71 bpm. TX interval 186 ms. QRS duration 100 ms. QT/QTc 406/441 ms. P-R-T axes 65 -3 57.    EKG #2  ECG taken at 0300, ECG read at 0311  Normal sinus rhythm  Septal infarct, age undetermined    No significant change as compared to prior, dated 5/19/25.  Rate 73 bpm. TX interval 192 ms. QRS duration 102 ms. QT/QTc 400/440 ms. P-R-T axes 61 -5 50.     Independent Interpretation   CT Head: No intracranial hemorrhage.    ED Course   "    Medications Administered   Medications   fentaNYL (PF) (SUBLIMAZE) injection 25 mcg (has no administration in time range)   iopamidol (ISOVUE-370) solution 68 mL (68 mLs Intravenous $Given 5/19/25 0011)   sodium chloride 0.9 % bag for CT scan flush (80 mLs Intravenous $Given 5/19/25 0011)   acetaminophen (TYLENOL) tablet 1,000 mg (1,000 mg Oral $Given 5/19/25 0044)   sodium chloride 0.9% BOLUS 500 mL (0 mLs Intravenous Stopped 5/19/25 0351)     Procedures   Procedures     Discussion of Management   See ED course.     ED Course   ED Course as of 05/19/25 0600   Mon May 19, 2025   0000 I obtained history and examined the patient as noted above.    0401 I rechecked and updated the patient.     0509 I spoke with Dr. Yepez, hospitalist, regarding the patient's history and presentation in the emergency department today. Dr. Yepez did not think there would be much to offer the patient in the hospital      0558 Patient now would like to be discharged.      Additional Documentation  None    Medical Decision Making / Diagnosis     CMS Diagnoses: None    MIPS   CT for PE was ordered because the patient is high risk for pulmonary embolism.           TOM Hogue is a 80 year old female who presented to the emergency department with chest pain.  This is atypical chest pain.  Has no pattern to when it occurs.  Not exertional.  Has been seen several times recently for the same chest pain.  I was called to the room shortly after she arrived as nurse reported that she went unresponsive.  He reports that she seemed to not respond to him for about 10 to 15 seconds and did not take a breath during this episode.  She then \"gasped\" for air after about 10 to 15 seconds.  She was on the monitor during this episode and there was no heart rate, oxygen saturation or blood pressure change during these episodes..  There was no seizure activity.  She did not change color.  Patient reports remembering these episodes and feels " very at peace during these episodes.  Patient reports that she has been having these episodes for several months.  She told the nurse that she has been having these episodes for several years.  Given these episodes and her chest pain, broad workup was obtained to rule out serious or concerning pathology.  Given this, did order CT to evaluate for concerning pathology of chest and abdominal pain as well as head CT.  Fortunately these do not show any new concerning findings.  Has few incidental findings that she is aware of.  Troponin minimally elevated but stable on recheck.  Patient is motivated to be discharged.  I discussed possibility of hospitalization.  She was hesitant to do this but I did discuss patient with hospitalist.  Hospitalist felt that there would be not much to offer her in the hospital.  She has no coronary artery disease disease on recent angiogram and has had echo in March 2025 that showed EF had returned to normal.  She has been followed with cardiology closely.  I discussed these episodes of not breathing for 10 to 15 seconds with hospitalist as well but given that there was no concerning findings on the monitor during episodes, seems unlikely to be arrhythmia or heart block.  With our in-depth discussion, we both felt that hospitalization would not offer much benefit to the patient and patient is in agreement with discharge.  Recommend follow-up with PCP and cardiology.  Patient initially appeared very anxious on my initial evaluation but on recheck, she appeared calm.  She does report being under stress related to the current president and her  notes that she is under quite a bit of stress. Follow up as an outpatient.    Disposition   The patient was discharged.     Diagnosis     ICD-10-CM    1. Pulmonary nodules  R91.8       2. Chest pain, unspecified type  R07.9          Scribe Disclosure:  I, Maddie Aguilar, am serving as a scribe at 12:20 AM on 5/19/2025 to document services  personally performed by Carolina Merlos MD based on my observations and the provider's statements to me.        Carolina Merlos MD  05/19/25 1014

## 2025-05-19 NOTE — ED NOTES
[facility]  ED Nurse Handoff Report    ED Chief complaint: Chest Pain      ED Diagnosis:   Final diagnoses:   Pulmonary nodules   Chest pain, unspecified type       Code Status: to be addressed by provider    Allergies:   Allergies   Allergen Reactions    Sulfa Antibiotics Unknown     Long time ago, she cannot remember    Diazepam Other (See Comments)     Makes more anxious    Meperidine Nausea and Vomiting and Unknown    Morphine Nausea and Unknown    Penicillins Itching    Zafirlukast      She does not remember       Patient Story: Patient BIBA from home for chest pain  Focused Assessment:  Patient reports developing a sharp pain across her chest while getting ready for bed. She described this pain as more intense than the pain she was evaluated for 2 days ago. EMS gave nitroglycerin X 1 and 324mg ASA.    Treatments and/or interventions provided: IV, labs, imaging, see MAR  Patient's response to treatments and/or interventions:   Labs Ordered and Resulted from Time of ED Arrival to Time of ED Departure   COMPREHENSIVE METABOLIC PANEL - Abnormal       Result Value    Sodium 132 (*)     Potassium 4.3      Carbon Dioxide (CO2) 24      Anion Gap 11      Urea Nitrogen 23.5 (*)     Creatinine 0.78      GFR Estimate 76      Calcium 9.2      Chloride 97 (*)     Glucose 91      Alkaline Phosphatase 79      AST 19      ALT 18      Protein Total 6.4      Albumin 3.9      Bilirubin Total 0.2     TROPONIN T, HIGH SENSITIVITY - Abnormal    Troponin T, High Sensitivity 24 (*)    CBC WITH PLATELETS AND DIFFERENTIAL - Abnormal    WBC Count 6.3      RBC Count 3.68 (*)     Hemoglobin 10.5 (*)     Hematocrit 31.4 (*)     MCV 85      MCH 28.5      MCHC 33.4      RDW 13.7      Platelet Count 310      % Neutrophils 50      % Lymphocytes 34      % Monocytes 12      % Eosinophils 3      % Basophils 1      % Immature Granulocytes 1      NRBCs per 100 WBC 0      Absolute Neutrophils 3.2      Absolute Lymphocytes 2.1       Absolute Monocytes 0.7      Absolute Eosinophils 0.2      Absolute Basophils 0.0      Absolute Immature Granulocytes 0.0      Absolute NRBCs 0.0     TROPONIN T, HIGH SENSITIVITY - Abnormal    Troponin T, High Sensitivity 27 (*)    LIPASE - Normal    Lipase 29       CT Chest PE Abdomen Pelvis w Contrast   Final Result   IMPRESSION:   1.  Negative for pulmonary embolism.      2.  Subpleural scarring and/or atelectasis in the periphery of the lungs. No evidence for pneumonitis.      3.  Few technically indeterminate pulmonary nodules measuring up to 6 mm are unchanged. Recommend additional follow-up chest CT in 6 months to monitor for longer term stability. No new or enlarging nodules.      4.  Evidence of prior granulomatous disease.      5.  Moderate to severe coronary artery calcification.      6.  No acute findings in the abdomen or pelvis.      7.  Right nephrectomy.      8.  Large amount of stool in the colon.      CT Head w/o Contrast   Final Result   IMPRESSION:   1.  No acute intracranial process.            To be done/followed up on inpatient unit:  monitor pain    Does this patient have any cognitive concerns?: forgetful    Activity level - Baseline/Home:  Independent  Activity Level - Current:   Stand with Assist    Patient's Preferred language: English   Needed?: No    Isolation: None  Infection: Not Applicable  Patient tested for COVID 19 prior to admission: NO  Bariatric?: No    Vital Signs:   Vitals:    05/19/25 0130 05/19/25 0300 05/19/25 0343 05/19/25 0430   BP: (!) 148/88 (!) 146/79 131/76 131/69   Pulse: 75 71 70 70   Resp: 16 12 14 13   Temp:       TempSrc:       SpO2: 98% 98% 98%    Weight:       Height:           Cardiac Rhythm:Cardiac Rhythm: Normal sinus rhythm    Was the PSS-3 completed:   Yes  What interventions are required if any?               Family Comments: significant other went home  OBS brochure/video discussed/provided to patient/family: N/A              Name of person  given brochure if not patient:               Relationship to patient:     For the majority of the shift this patient's behavior was Green.   Behavioral interventions performed were .    ED NURSE PHONE NUMBER: [phone]

## 2025-05-19 NOTE — DISCHARGE INSTRUCTIONS
Follow up with cardiology and primary care provider    Try tylenol at home as needed    Discharge Instructions  Chest Pain    You have been seen today for chest pain or discomfort.  At this time, your provider has found no signs that your chest pain is due to a serious or life-threatening condition, (or you have declined more testing and/or admission to the hospital). However, sometimes there is a serious problem that does not show up right away. Your evaluation today may not be complete and you may need further testing and evaluation.     Generally, every Emergency Department visit should have a follow-up clinic visit with either a primary or a specialty clinic/provider. Please follow-up as instructed by your emergency provider today.  Return to the Emergency Department if:  Your chest pain changes, gets worse, starts to happen more often, or comes with less activity.  You are newly short of breath.  You get very weak or tired.  You pass out or faint.  You have any new symptoms, like fever, cough, numb legs, or you cough up blood.  You have anything else that worries you.    Until you follow-up with your regular provider, please do the following:  Take one aspirin daily unless you have an allergy or are told not to by your provider.  If a stress test appointment has been made, go to the appointment.  If you have questions, contact your regular provider.  Follow-up with your regular provider/clinic as directed; this is very important.    If you were given a prescription for medicine here today, be sure to read all of the information (including the package insert) that comes with your prescription.  This will include important information about the medicine, its side effects, and any warnings that you need to know about.  The pharmacist who fills the prescription can provide more information and answer questions you may have about the medicine.  If you have questions or concerns that the pharmacist cannot address,  please call or return to the Emergency Department.       Remember that you can always come back to the Emergency Department if you are not able to see your regular provider in the amount of time listed above, if you get any new symptoms, or if there is anything that worries you.

## 2025-05-21 LAB
ATRIAL RATE - MUSE: 71 BPM
DIASTOLIC BLOOD PRESSURE - MUSE: NORMAL MMHG
INTERPRETATION ECG - MUSE: NORMAL
P AXIS - MUSE: 65 DEGREES
PR INTERVAL - MUSE: 186 MS
QRS DURATION - MUSE: 100 MS
QT - MUSE: 406 MS
QTC - MUSE: 441 MS
R AXIS - MUSE: -3 DEGREES
SYSTOLIC BLOOD PRESSURE - MUSE: NORMAL MMHG
T AXIS - MUSE: 57 DEGREES
VENTRICULAR RATE- MUSE: 71 BPM

## 2025-05-23 ENCOUNTER — HOSPITAL ENCOUNTER (OUTPATIENT)
Dept: CARDIAC REHAB | Facility: CLINIC | Age: 80
Discharge: HOME | End: 2025-05-23
Attending: INTERNAL MEDICINE
Payer: COMMERCIAL

## 2025-05-23 PROCEDURE — 93798 PHYS/QHP OP CAR RHAB W/ECG: CPT | Performed by: REHABILITATION PRACTITIONER

## 2025-05-28 ENCOUNTER — HOSPITAL ENCOUNTER (OUTPATIENT)
Dept: CARDIAC REHAB | Facility: CLINIC | Age: 80
Discharge: HOME | End: 2025-05-28
Attending: INTERNAL MEDICINE
Payer: COMMERCIAL

## 2025-05-28 PROCEDURE — 93798 PHYS/QHP OP CAR RHAB W/ECG: CPT | Performed by: REHABILITATION PRACTITIONER

## 2025-06-02 ENCOUNTER — HOSPITAL ENCOUNTER (OUTPATIENT)
Dept: CARDIAC REHAB | Facility: CLINIC | Age: 80
Discharge: HOME | End: 2025-06-02
Attending: INTERNAL MEDICINE
Payer: COMMERCIAL

## 2025-06-02 PROCEDURE — 93798 PHYS/QHP OP CAR RHAB W/ECG: CPT | Performed by: OCCUPATIONAL THERAPIST

## 2025-06-03 ENCOUNTER — MYC MEDICAL ADVICE (OUTPATIENT)
Dept: CARDIOLOGY | Facility: CLINIC | Age: 80
End: 2025-06-03
Payer: COMMERCIAL

## 2025-06-03 DIAGNOSIS — I48.0 PAROXYSMAL ATRIAL FIBRILLATION (H): Primary | ICD-10-CM

## 2025-06-03 DIAGNOSIS — I47.10 SVT (SUPRAVENTRICULAR TACHYCARDIA): ICD-10-CM

## 2025-06-04 ENCOUNTER — HOSPITAL ENCOUNTER (OUTPATIENT)
Dept: CARDIAC REHAB | Facility: CLINIC | Age: 80
Discharge: HOME | End: 2025-06-04
Attending: INTERNAL MEDICINE
Payer: COMMERCIAL

## 2025-06-04 ENCOUNTER — APPOINTMENT (OUTPATIENT)
Dept: GENERAL RADIOLOGY | Facility: CLINIC | Age: 80
End: 2025-06-04
Attending: EMERGENCY MEDICINE
Payer: COMMERCIAL

## 2025-06-04 ENCOUNTER — HOSPITAL ENCOUNTER (EMERGENCY)
Facility: CLINIC | Age: 80
Discharge: HOME | End: 2025-06-05
Attending: EMERGENCY MEDICINE | Admitting: EMERGENCY MEDICINE
Payer: COMMERCIAL

## 2025-06-04 DIAGNOSIS — E87.1 HYPONATREMIA: ICD-10-CM

## 2025-06-04 DIAGNOSIS — R07.9 CHEST PAIN, UNSPECIFIED TYPE: ICD-10-CM

## 2025-06-04 LAB
ALBUMIN SERPL BCG-MCNC: 3.9 G/DL (ref 3.5–5.2)
ALP SERPL-CCNC: 79 U/L (ref 40–150)
ALT SERPL W P-5'-P-CCNC: 17 U/L (ref 0–50)
ANION GAP SERPL CALCULATED.3IONS-SCNC: 12 MMOL/L (ref 7–15)
AST SERPL W P-5'-P-CCNC: 19 U/L (ref 0–45)
ATRIAL RATE - MUSE: 78 BPM
BASOPHILS # BLD AUTO: 0 10E3/UL (ref 0–0.2)
BASOPHILS NFR BLD AUTO: 0 %
BILIRUB SERPL-MCNC: 0.3 MG/DL
BUN SERPL-MCNC: 26.3 MG/DL (ref 8–23)
CALCIUM SERPL-MCNC: 9.1 MG/DL (ref 8.8–10.4)
CHLORIDE SERPL-SCNC: 89 MMOL/L (ref 98–107)
CREAT SERPL-MCNC: 0.71 MG/DL (ref 0.51–0.95)
DIASTOLIC BLOOD PRESSURE - MUSE: NORMAL MMHG
EGFRCR SERPLBLD CKD-EPI 2021: 85 ML/MIN/1.73M2
EOSINOPHIL # BLD AUTO: 0.2 10E3/UL (ref 0–0.7)
EOSINOPHIL NFR BLD AUTO: 3 %
ERYTHROCYTE [DISTWIDTH] IN BLOOD BY AUTOMATED COUNT: 13.7 % (ref 10–15)
GLUCOSE SERPL-MCNC: 105 MG/DL (ref 70–99)
HCO3 SERPL-SCNC: 24 MMOL/L (ref 22–29)
HCT VFR BLD AUTO: 33.8 % (ref 35–47)
HGB BLD-MCNC: 11.5 G/DL (ref 11.7–15.7)
HOLD SPECIMEN: NORMAL
HOLD SPECIMEN: NORMAL
IMM GRANULOCYTES # BLD: 0 10E3/UL
IMM GRANULOCYTES NFR BLD: 0 %
INTERPRETATION ECG - MUSE: NORMAL
LYMPHOCYTES # BLD AUTO: 2.1 10E3/UL (ref 0.8–5.3)
LYMPHOCYTES NFR BLD AUTO: 29 %
MCH RBC QN AUTO: 28.6 PG (ref 26.5–33)
MCHC RBC AUTO-ENTMCNC: 34 G/DL (ref 31.5–36.5)
MCV RBC AUTO: 84 FL (ref 78–100)
MONOCYTES # BLD AUTO: 0.6 10E3/UL (ref 0–1.3)
MONOCYTES NFR BLD AUTO: 9 %
NEUTROPHILS # BLD AUTO: 4.1 10E3/UL (ref 1.6–8.3)
NEUTROPHILS NFR BLD AUTO: 58 %
NRBC # BLD AUTO: 0 10E3/UL
NRBC BLD AUTO-RTO: 0 /100
P AXIS - MUSE: 56 DEGREES
PLATELET # BLD AUTO: 319 10E3/UL (ref 150–450)
POTASSIUM SERPL-SCNC: 4.2 MMOL/L (ref 3.4–5.3)
PR INTERVAL - MUSE: 166 MS
PROT SERPL-MCNC: 6.5 G/DL (ref 6.4–8.3)
QRS DURATION - MUSE: 96 MS
QT - MUSE: 400 MS
QTC - MUSE: 456 MS
R AXIS - MUSE: -10 DEGREES
RBC # BLD AUTO: 4.02 10E6/UL (ref 3.8–5.2)
SODIUM SERPL-SCNC: 125 MMOL/L (ref 135–145)
SYSTOLIC BLOOD PRESSURE - MUSE: NORMAL MMHG
T AXIS - MUSE: 59 DEGREES
TROPONIN T SERPL HS-MCNC: 23 NG/L
TROPONIN T SERPL HS-MCNC: 25 NG/L
VENTRICULAR RATE- MUSE: 78 BPM
WBC # BLD AUTO: 7 10E3/UL (ref 4–11)

## 2025-06-04 PROCEDURE — 250N000009 HC RX 250: Performed by: EMERGENCY MEDICINE

## 2025-06-04 PROCEDURE — 84155 ASSAY OF PROTEIN SERUM: CPT | Performed by: EMERGENCY MEDICINE

## 2025-06-04 PROCEDURE — 36415 COLL VENOUS BLD VENIPUNCTURE: CPT | Performed by: EMERGENCY MEDICINE

## 2025-06-04 PROCEDURE — 85004 AUTOMATED DIFF WBC COUNT: CPT | Performed by: EMERGENCY MEDICINE

## 2025-06-04 PROCEDURE — 93005 ELECTROCARDIOGRAM TRACING: CPT

## 2025-06-04 PROCEDURE — 93798 PHYS/QHP OP CAR RHAB W/ECG: CPT | Performed by: REHABILITATION PRACTITIONER

## 2025-06-04 PROCEDURE — 85025 COMPLETE CBC W/AUTO DIFF WBC: CPT | Performed by: EMERGENCY MEDICINE

## 2025-06-04 PROCEDURE — 80053 COMPREHEN METABOLIC PANEL: CPT | Performed by: EMERGENCY MEDICINE

## 2025-06-04 PROCEDURE — 71046 X-RAY EXAM CHEST 2 VIEWS: CPT

## 2025-06-04 PROCEDURE — 84484 ASSAY OF TROPONIN QUANT: CPT | Performed by: EMERGENCY MEDICINE

## 2025-06-04 PROCEDURE — 250N000013 HC RX MED GY IP 250 OP 250 PS 637: Performed by: EMERGENCY MEDICINE

## 2025-06-04 PROCEDURE — 99285 EMERGENCY DEPT VISIT HI MDM: CPT | Mod: 25

## 2025-06-04 PROCEDURE — 84484 ASSAY OF TROPONIN QUANT: CPT | Mod: 91 | Performed by: EMERGENCY MEDICINE

## 2025-06-04 RX ORDER — LIDOCAINE HYDROCHLORIDE 20 MG/ML
10 SOLUTION OROPHARYNGEAL ONCE
Status: COMPLETED | OUTPATIENT
Start: 2025-06-04 | End: 2025-06-04

## 2025-06-04 RX ORDER — ACETAMINOPHEN 500 MG
1000 TABLET ORAL ONCE
Status: COMPLETED | OUTPATIENT
Start: 2025-06-04 | End: 2025-06-04

## 2025-06-04 RX ORDER — MAGNESIUM HYDROXIDE/ALUMINUM HYDROXICE/SIMETHICONE 120; 1200; 1200 MG/30ML; MG/30ML; MG/30ML
15 SUSPENSION ORAL ONCE
Status: COMPLETED | OUTPATIENT
Start: 2025-06-04 | End: 2025-06-04

## 2025-06-04 RX ADMIN — ALUMINUM HYDROXIDE, MAGNESIUM HYDROXIDE, AND SIMETHICONE 15 ML: 200; 200; 20 SUSPENSION ORAL at 23:58

## 2025-06-04 RX ADMIN — LIDOCAINE HYDROCHLORIDE 10 ML: 20 SOLUTION ORAL at 23:56

## 2025-06-04 RX ADMIN — ACETAMINOPHEN 1000 MG: 500 TABLET ORAL at 22:02

## 2025-06-04 ASSESSMENT — COLUMBIA-SUICIDE SEVERITY RATING SCALE - C-SSRS
1. IN THE PAST MONTH, HAVE YOU WISHED YOU WERE DEAD OR WISHED YOU COULD GO TO SLEEP AND NOT WAKE UP?: NO
2. HAVE YOU ACTUALLY HAD ANY THOUGHTS OF KILLING YOURSELF IN THE PAST MONTH?: NO
6. HAVE YOU EVER DONE ANYTHING, STARTED TO DO ANYTHING, OR PREPARED TO DO ANYTHING TO END YOUR LIFE?: NO

## 2025-06-04 ASSESSMENT — ACTIVITIES OF DAILY LIVING (ADL)
ADLS_ACUITY_SCORE: 59

## 2025-06-04 NOTE — TELEPHONE ENCOUNTER
I called [facility] to find out the status of the previous ziopatch that was ordered. I was told that they never received an order so pt was not mailed a ziopatch. I will place a future mail out order at this time. Paco LAWLER June 4, 2025, 11:26 AM

## 2025-06-05 VITALS
SYSTOLIC BLOOD PRESSURE: 137 MMHG | BODY MASS INDEX: 24.3 KG/M2 | TEMPERATURE: 97.8 F | RESPIRATION RATE: 10 BRPM | HEART RATE: 69 BPM | DIASTOLIC BLOOD PRESSURE: 82 MMHG | WEIGHT: 137.2 LBS | OXYGEN SATURATION: 99 %

## 2025-06-05 NOTE — ED PROVIDER NOTES
Emergency Department Note      History of Present Illness     Chief Complaint   Chest Pain      HPI   Margareth Hogue is a 80 year old female with a history of NSTEMI, atrial fibrillation on Eliquis, stress-induced cardiomyopathy, hypertension, and hyperlipidemia who presents to the ED for an evaluation of chest pain. The patient reports that at 11 am this morning while at the bank, she reports sudden onset of chest pain accompanying her stress. She describes the pain as constant and in the middle of her chest. Adds that nothing makes it better or worse and that it is currently a 3-4/10 in severity. She notes that the pain initially began in her left arm then traveled to her chest, back, and then right arm.  She has had multiple similar episodes of chest pain over the last 2 months which have undergone extensive negative workup.  She denies any injuries, shortness of breath upon exertion, fever, shortness of breath, or leg swelling. The patient reports taking 3 nitroglycerins at home with no change and that she took Tylenol at 2 pm with no relief. She affirms that she felt normal yesterday and this morning. Also affirms that she did not miss any doses of her Eliquis. States she has had this pain before in the past.     Independent Historian   None    Review of External Notes   Reviewed ED discharge note from 5/18/25 where patient was seen for chest pain.     Past Medical History     Medical History and Problem List   Paroxysmal atrial fibrillation  GERD  HFrEF  NSTEMI  Stress induced cardiomyopathy  Asthma  Hypertension  Hyperlipidemia    Medications   Albuterol inhaler  Eliquis  Atorvastatin  Flonase  Imdur  Meclizine  Metoprolol succinate  Nitroglycerin  Omeprazole  Torsemide  Valsartan    Surgical History   Coronary angiogram  Left heart catheterization  Left ventriculogram     Physical Exam     Patient Vitals for the past 24 hrs:   BP Temp Temp src Pulse Resp SpO2 Weight   06/05/25 0000 (!) 152/87 -- -- 68 --  -- --   06/04/25 2359 -- -- -- -- 10 -- --   06/04/25 2330 -- -- -- 68 -- 98 % --   06/04/25 2300 137/83 -- -- 70 10 98 % --   06/04/25 2200 (!) 162/92 -- -- 72 10 99 % --   06/04/25 2130 -- -- -- 73 12 98 % --   06/04/25 1937 138/80 97.8  F (36.6  C) Temporal 81 14 97 % 62.2 kg (137 lb 3.2 oz)     Physical Exam  General: Alert and cooperative with exam. Patient in mild distress. Normal mentation.  Well-appearing  Head:  Scalp is NC/AT  Eyes:  No scleral icterus, PERRL  ENT:  The external nose and ears are normal. The oropharynx is normal and without erythema; mucus membranes are moist. Uvula midline, no evidence of deep space infection.  Neck:  Normal range of motion without rigidity.  CV:  Regular rate and rhythm    No pathologic murmur   Resp:  Breath sounds are clear bilaterally    Non-labored, no retractions or accessory muscle use  GI:  Abdomen is soft, no distension, no tenderness. No peritoneal signs  MS:  No lower extremity edema   Skin:  Warm and dry, No rash or lesions noted.  Neuro: Oriented x 3. No gross motor deficits.      Diagnostics     Lab Results   Labs Ordered and Resulted from Time of ED Arrival to Time of ED Departure   COMPREHENSIVE METABOLIC PANEL - Abnormal       Result Value    Sodium 125 (*)     Potassium 4.2      Carbon Dioxide (CO2) 24      Anion Gap 12      Urea Nitrogen 26.3 (*)     Creatinine 0.71      GFR Estimate 85      Calcium 9.1      Chloride 89 (*)     Glucose 105 (*)     Alkaline Phosphatase 79      AST 19      ALT 17      Protein Total 6.5      Albumin 3.9      Bilirubin Total 0.3     TROPONIN T, HIGH SENSITIVITY - Abnormal    Troponin T, High Sensitivity 25 (*)    CBC WITH PLATELETS AND DIFFERENTIAL - Abnormal    WBC Count 7.0      RBC Count 4.02      Hemoglobin 11.5 (*)     Hematocrit 33.8 (*)     MCV 84      MCH 28.6      MCHC 34.0      RDW 13.7      Platelet Count 319      % Neutrophils 58      % Lymphocytes 29      % Monocytes 9      % Eosinophils 3      % Basophils 0       % Immature Granulocytes 0      NRBCs per 100 WBC 0      Absolute Neutrophils 4.1      Absolute Lymphocytes 2.1      Absolute Monocytes 0.6      Absolute Eosinophils 0.2      Absolute Basophils 0.0      Absolute Immature Granulocytes 0.0      Absolute NRBCs 0.0     TROPONIN T, HIGH SENSITIVITY - Abnormal    Troponin T, High Sensitivity 23 (*)        Imaging   Chest XR,  PA & LAT   Final Result   IMPRESSION: No acute abnormality.          EKG   ECG taken at 1942, ECG read at 2305  Sinus rhythm  Low voltage QRS  Borderline ECG   No significant change as compared to prior, dated 05/19/25.  Rate 78 bpm. AR interval 166 ms. QRS duration 96 ms. QT/QTc 400/456 ms. P-R-T axes 56 -10 59.      Independent Interpretation   None    ED Course      Medications Administered   Medications   acetaminophen (TYLENOL) tablet 1,000 mg (1,000 mg Oral $Given 6/4/25 2202)   lidocaine (viscous) (XYLOCAINE) 2 % solution 10 mL (10 mLs Mouth/Throat $Given 6/4/25 5263)   alum & mag hydroxide-simethicone (MAALOX) suspension 15 mL (15 mLs Oral $Given 6/4/25 7179)       Procedures   Procedures     Discussion of Management   None    ED Course   ED Course as of 06/05/25 0042   Wed Jun 04, 2025 2108 I obtained history and examined the patient as noted above.     2342 I rechecked and updated the patient.         Additional Documentation  Social Determinants of Health: None    Medical Decision Making / Diagnosis     CMS Diagnoses: None    MIPS   None    MDM   Margareth Hogue is a 80 year old female who presents with chest pain.  The work up in the Emergency Department is negative.  I considered a broad differential diagnosis in this patient including life-threatening etiologies such as acute coronary syndrome, myocardial infarction, pulmonary embolism, acute aortic dissection, myocarditis, pericarditis, acute valvular insufficiency amongst others.  Other causes considered for this patient included pneumonia, pneumothorax, chest wall source,  pericarditis, pleurisy, esophageal spasm, etc.  No serious etiology for the chest pain were detected today during this visit.  Patient has had recent cardiac catheterization which demonstrated no significant stenosis.  Echocardiogram in March demonstrated recovered ejection fraction after episode of stress-induced cardiomyopathy.  Patient has seen her cardiologist after several of these episodes with no determined cardiac cause.  Endorses compliance with Eliquis and there is low clinical suspicion for PE; recent CT PE study was without significant acute findings.  She reported no significant change in her symptoms with GI cocktail or Tylenol in the ED.  She does have some reproducible chest discomfort on exam and there may be some musculoskeletal component; reports current chest discomfort is minimal.  Labs were notable for hyponatremia (sodium 125).  Patient does have history of chronic hyponatremia.  Did recommend free water restriction and close follow-up with PCP for reevaluation.  At this time I feel patient is a safe and appropriate for continued outpatient management.  Patient is in agreement with plan.  Discharged home.  Return precautions discussed.    Disposition   The patient was discharged.     Diagnosis     ICD-10-CM    1. Chest pain, unspecified type  R07.9       2. Hyponatremia  E87.1              Scribe Disclosure:  I, Amy Neely, am serving as a scribe at 11:03 PM on 6/4/2025 to document services personally performed by Samy Dave DO based on my observations and the provider's statements to me.        Samy Dave DO  06/05/25 0047

## 2025-06-05 NOTE — ED TRIAGE NOTES
Mid-sternal chest pain that radiates to her left arm and back since this morning. Took a total of 3 nitroglycerin at around 11am. Pain has bee 3/10      Triage Assessment (Adult)       Row Name 06/04/25 1936          Triage Assessment    Airway WDL WDL        Respiratory WDL    Respiratory WDL WDL        Skin Circulation/Temperature WDL    Skin Circulation/Temperature WDL WDL        Cardiac WDL    Cardiac WDL X;chest pain        Chest Pain Assessment    Chest Pain Location midsternal     Chest Pain Radiation shoulder;back     Alleviating Factors nothing     Chest Pain Intervention cardiac biomarkers drawn;12-lead ECG obtained        Peripheral/Neurovascular WDL    Peripheral Neurovascular WDL WDL        Cognitive/Neuro/Behavioral WDL    Cognitive/Neuro/Behavioral WDL WDL

## 2025-06-06 ENCOUNTER — ORDERS ONLY (AUTO-RELEASED) (OUTPATIENT)
Dept: CARDIOLOGY | Facility: CLINIC | Age: 80
End: 2025-06-06
Payer: COMMERCIAL

## 2025-06-06 DIAGNOSIS — I48.0 PAROXYSMAL ATRIAL FIBRILLATION (H): ICD-10-CM

## 2025-06-06 DIAGNOSIS — I47.10 SVT (SUPRAVENTRICULAR TACHYCARDIA): ICD-10-CM

## 2025-06-09 ENCOUNTER — HOSPITAL ENCOUNTER (OUTPATIENT)
Dept: CARDIAC REHAB | Facility: CLINIC | Age: 80
Discharge: HOME | End: 2025-06-09
Attending: INTERNAL MEDICINE
Payer: COMMERCIAL

## 2025-06-09 PROCEDURE — 93798 PHYS/QHP OP CAR RHAB W/ECG: CPT

## 2025-06-11 ENCOUNTER — HOSPITAL ENCOUNTER (OUTPATIENT)
Dept: CARDIAC REHAB | Facility: CLINIC | Age: 80
Discharge: HOME | End: 2025-06-11
Attending: INTERNAL MEDICINE
Payer: COMMERCIAL

## 2025-06-11 PROCEDURE — 93798 PHYS/QHP OP CAR RHAB W/ECG: CPT | Performed by: REHABILITATION PRACTITIONER

## 2025-06-16 ENCOUNTER — HOSPITAL ENCOUNTER (OUTPATIENT)
Dept: CARDIAC REHAB | Facility: CLINIC | Age: 80
Discharge: HOME | End: 2025-06-16
Attending: INTERNAL MEDICINE
Payer: COMMERCIAL

## 2025-06-16 PROCEDURE — 93797 PHYS/QHP OP CAR RHAB WO ECG: CPT

## 2025-06-16 PROCEDURE — 93798 PHYS/QHP OP CAR RHAB W/ECG: CPT

## 2025-06-19 ENCOUNTER — TELEPHONE (OUTPATIENT)
Dept: CARDIOLOGY | Facility: CLINIC | Age: 80
End: 2025-06-19
Payer: COMMERCIAL

## 2025-06-19 NOTE — TELEPHONE ENCOUNTER
"Patient called and LM on team 3 phone requesting refill for nitroglycerine. Per message stated \"I took a nitroglycerin this morning with chest pain, it helped a lot, but I'm out now\".   Patient recently seen with Shelli Yuen and in ED for atypical CP  Requesting callback for update on sx and to discuss refill     Please call patient back  Last seen by Shelli Yuen, NP : Routing to team 7     Carlos Akhtar RN on 6/19/2025 at 3:56 PM    " -c/w oxybutynin

## 2025-06-26 ENCOUNTER — MYC REFILL (OUTPATIENT)
Dept: CARDIOLOGY | Facility: CLINIC | Age: 80
End: 2025-06-26
Payer: COMMERCIAL

## 2025-06-26 DIAGNOSIS — I21.4 NSTEMI (NON-ST ELEVATED MYOCARDIAL INFARCTION) (H): ICD-10-CM

## 2025-06-26 DIAGNOSIS — I48.92 ATRIAL FLUTTER WITH RAPID VENTRICULAR RESPONSE (H): ICD-10-CM

## 2025-06-26 LAB — CV ZIO PRELIM RESULTS: NORMAL

## 2025-06-26 RX ORDER — ISOSORBIDE MONONITRATE 30 MG/1
30 TABLET, EXTENDED RELEASE ORAL DAILY
Qty: 90 TABLET | Refills: 3 | Status: SHIPPED | OUTPATIENT
Start: 2025-06-26

## 2025-06-26 RX ORDER — ISOSORBIDE MONONITRATE 30 MG/1
30 TABLET, EXTENDED RELEASE ORAL DAILY
Qty: 90 TABLET | Refills: 0 | Status: CANCELLED | OUTPATIENT
Start: 2025-06-26

## 2025-06-27 ENCOUNTER — RESULTS FOLLOW-UP (OUTPATIENT)
Dept: CARDIOLOGY | Facility: CLINIC | Age: 80
End: 2025-06-27

## 2025-07-02 ENCOUNTER — RESULTS FOLLOW-UP (OUTPATIENT)
Dept: CARDIOLOGY | Facility: CLINIC | Age: 80
End: 2025-07-02

## 2025-07-02 ENCOUNTER — HOSPITAL ENCOUNTER (OUTPATIENT)
Dept: CARDIOLOGY | Facility: CLINIC | Age: 80
Discharge: HOME | End: 2025-07-02
Attending: INTERNAL MEDICINE
Payer: COMMERCIAL

## 2025-07-02 DIAGNOSIS — I48.92 ATRIAL FLUTTER WITH RAPID VENTRICULAR RESPONSE (H): ICD-10-CM

## 2025-07-02 DIAGNOSIS — I21.4 NSTEMI (NON-ST ELEVATED MYOCARDIAL INFARCTION) (H): ICD-10-CM

## 2025-07-02 LAB — LVEF ECHO: NORMAL

## 2025-07-02 PROCEDURE — 93325 DOPPLER ECHO COLOR FLOW MAPG: CPT

## 2025-07-07 ENCOUNTER — TELEPHONE (OUTPATIENT)
Dept: CARDIOLOGY | Facility: CLINIC | Age: 80
End: 2025-07-07

## 2025-07-07 ENCOUNTER — OFFICE VISIT (OUTPATIENT)
Dept: CARDIOLOGY | Facility: CLINIC | Age: 80
End: 2025-07-07
Attending: INTERNAL MEDICINE
Payer: COMMERCIAL

## 2025-07-07 ENCOUNTER — LAB (OUTPATIENT)
Dept: LAB | Facility: CLINIC | Age: 80
End: 2025-07-07
Payer: COMMERCIAL

## 2025-07-07 VITALS
HEIGHT: 64 IN | DIASTOLIC BLOOD PRESSURE: 64 MMHG | HEART RATE: 82 BPM | OXYGEN SATURATION: 96 % | BODY MASS INDEX: 22.71 KG/M2 | WEIGHT: 133 LBS | SYSTOLIC BLOOD PRESSURE: 128 MMHG

## 2025-07-07 DIAGNOSIS — I47.10 SVT (SUPRAVENTRICULAR TACHYCARDIA): ICD-10-CM

## 2025-07-07 DIAGNOSIS — I21.4 NSTEMI (NON-ST ELEVATED MYOCARDIAL INFARCTION) (H): ICD-10-CM

## 2025-07-07 DIAGNOSIS — I10 BENIGN ESSENTIAL HYPERTENSION: ICD-10-CM

## 2025-07-07 DIAGNOSIS — I51.81 TAKOTSUBO CARDIOMYOPATHY: ICD-10-CM

## 2025-07-07 DIAGNOSIS — I10 BENIGN ESSENTIAL HYPERTENSION: Primary | ICD-10-CM

## 2025-07-07 DIAGNOSIS — I50.20 HEART FAILURE WITH REDUCED EJECTION FRACTION, NYHA CLASS I (H): ICD-10-CM

## 2025-07-07 DIAGNOSIS — I48.92 ATRIAL FLUTTER WITH RAPID VENTRICULAR RESPONSE (H): ICD-10-CM

## 2025-07-07 LAB
ANION GAP SERPL CALCULATED.3IONS-SCNC: 10 MMOL/L (ref 7–15)
BUN SERPL-MCNC: 19.3 MG/DL (ref 8–23)
CALCIUM SERPL-MCNC: 9.7 MG/DL (ref 8.8–10.4)
CHLORIDE SERPL-SCNC: 93 MMOL/L (ref 98–107)
CREAT SERPL-MCNC: 0.75 MG/DL (ref 0.51–0.95)
EGFRCR SERPLBLD CKD-EPI 2021: 80 ML/MIN/1.73M2
GLUCOSE SERPL-MCNC: 89 MG/DL (ref 70–99)
HCO3 SERPL-SCNC: 27 MMOL/L (ref 22–29)
MAGNESIUM SERPL-MCNC: 2.2 MG/DL (ref 1.7–2.3)
POTASSIUM SERPL-SCNC: 4.5 MMOL/L (ref 3.4–5.3)
SODIUM SERPL-SCNC: 130 MMOL/L (ref 135–145)

## 2025-07-07 PROCEDURE — 80048 BASIC METABOLIC PNL TOTAL CA: CPT

## 2025-07-07 PROCEDURE — 99214 OFFICE O/P EST MOD 30 MIN: CPT | Performed by: NURSE PRACTITIONER

## 2025-07-07 PROCEDURE — 83735 ASSAY OF MAGNESIUM: CPT

## 2025-07-07 PROCEDURE — 36415 COLL VENOUS BLD VENIPUNCTURE: CPT

## 2025-07-07 RX ORDER — VALSARTAN 40 MG/1
40 TABLET ORAL EVERY EVENING
Qty: 45 TABLET | Refills: 1 | Status: SHIPPED | OUTPATIENT
Start: 2025-07-07

## 2025-07-07 NOTE — LETTER
7/7/2025    Nadira Rob MD  [address], [address]  [place] [address]    RE: Margareth Hogue       Dear Colleague,     I had the pleasure of seeing Margareth Hogue in the [facility].    Cardiology Clinic Progress Note  Margareth Hogue MRN# [id number]   YOB: 1945 Age: 80 year old   Primary Cardiologist: Dr. Louis Reason for visit: Follow up testing            Assessment and Plan:     1.  Nonischemic cardiomyopathy, likely Takotsubo with recovered EF  - 3/7/25 Coronary angiogram: Showing no coronary artery disease, just minimal atherosclerosis in the LAD, LV with severe apical ballooning and sparing of the base  - Maintained on metoprolol, valsartan, and torsemide 10 mg daily-> previous attempts at dose reduction led to weight gain   - 3/28/2025 TTE showing improvement in LVEF to low normal, 50 to 55% and improvement in apical hypokinesis  - 7/2025 LVEF of 50 to 55%, borderline mild hypokinesis of the apex with normal right ventricular size and function, findings felt to be stable when compared to previous study  - On beta-blocker and ARB    2.  History of atrial fibrillation, s/p PVI/atrial flutter  - Anticoagulated with dose adjusted Eliquis for thromboembolic prophylaxis  - Rate controlled with metoprolol  - 6/2025: 14-day Zio patch monitor without evidence of recurrence, however questionable episodes of atrial tachycardia were present    3.  Essential hypertension, well-controlled    4.  Near syncope  -Occurred in the setting of new heart failure in March, had another episode prior to the first episode where she slipped and fell and was told not to drive, has been stable since that time without any recurrence of syncope  - Possibly secondary to #7    5.  Pulmonary sarcoid, stable    6.  Chest pain  - Multiple emergency room visits over the last 2 months for chest pain with atypical features, sometimes relieved with nitroglycerin, persisting despite Imdur and completion of  cardiac rehab  -Reassuring pain is noncardiac given recent coronary angiogram in March of this year showing normal coronary arteries and only minimal atherotomous changes in the LAD  - She did also have a CT PE study in May during one of her ER visits which did not show evidence of pulmonary emboli  - Given her lightheadedness and orthostasis, we will discontinue her Imdur and see if this improves or if her chest pain worsens    7.  SVT  - Patient with sporadic dizziness and 1 notable prolonged episode of SVT occurring during cardiac rehab lasting 13 minutes  -6/2025: 14-day Zio patch monitor showing 67 episodes of supraventricular tachycardia with the longest lasting 1 minute and 47 seconds and an average heart rate of 129 bpm, unclear if these were symptomatic as patient's said she did not use the monitor button for episodes of dizziness  - On beta-blocker with soft blood pressures, unable to uptitrate  - Check BMP and magnesium level    8.  Chronic hyponatremia  - Sodium levels typically between 127 and 134  - Possibly contributing to symptoms of dizziness and feeling generally unwell, will repeat BMP today    Plan:  Stop Imdur  Continue atorvastatin 10 mg daily  Continue apixaban 2.5 mg twice daily, dose adjusted for age and weight  Continue metoprolol XL 25 mg daily, patient will check at home if she is taking this medication  Increase valsartan 40 mg daily, with hold parameters for systolic less than 100  Continue torsemide 10 mg daily, with hold parameters  Repeat BMP today for reassessment of chronic hyponatremia and magnesium level    Follow up: Follow-up with electrophysiology for her SVT        History of Presenting Illness:    Margareth Hogue is a very pleasant 80 year old female with a history of atrial fibrillation, s/p PVI, atrial flutter/tachycardia, dyslipidemia, renal cell carcinoma, s/p right nephrectomy, pulmonary sarcoidosis, GERD, TIA.    She was seen by Dr. Louis in April of this year  following a hospital admission for chest pain.  An echocardiogram done during that hospital stay showed an ejection fraction of 30 to 35%.  She did undergo coronary angiogram which showed no coronary artery disease.  She was diagnosed with a stress cardiomyopathy and initiated on appropriate medications including valsartan and metoprolol along with torsemide.   Her transthoracic echo from 3/28/2025 showed an LVEF of 50 to 55% with mild apical wall hypokinesis and no significant valvular disease.    At their clinic visit, she continued to report episodes of chest pain requiring sublingual nitroglycerin.    She was seen in the emergency room 2 times in April with reports of chest pain. Her EKGs were non-ischemic and her torsemide was resumed daily. Troponins were trended and flat.     When I saw her in early May, she felt her chest pain frequency had overall improved and we did not adjust her cardiac regimen.    She had a repeat echocardiogram on 7/2/25 which showed low normal ejection fraction of 50 to 55% with borderline mild hypokinesis of the apical segment, normal right ventricular systolic function.    Following our visit, she was seen in the ER for chest pain three times, all of which had reassuring workups. of note she has had chronically mildly elevated troponin in the 20 range since her hospitalization in March of this year.    We were notified from cardiac rehab on 5/5/2025 about a 13-minute epsiode of SVT, during which patient was asymptomatic. Subsequently,she wore a zio patch monitor which showed 67 episodes of supraventricular tachycardia with average rate of 129 bpm.  The longest episode lasted 1 minute and 46 seconds, during which patient did not trigger the heart monitor.  Her average heart rate was 79 while in sinus rhythm with a minimum of 58 bpm.    Patient is here today for a follow-up of her recent testing and ER visits.     She continues to have episodes of chest pain that come on sporadically.   Sometimes they last only a few minutes, other times it last the whole day.  She does take sublingual nitroglycerin which only helps sporadically.  Sometimes it does not impact her symptoms at all.  She did press the monitor button for an episode of chest pain which correlated with sinus rhythm at a rate of 84 bpm.  She recently graduated from cardiac rehab.     She does have issues with dizziness.  Sometimes occurs with position changes, other times can occur while she is sitting.  She tells me she did not wish the button on the monitor when she felt dizzy as she says it happens so often.    She says she has days where she just does not feel well and will npt make plans.  She says prior to her hospitalization in March she could stay busy all day long and did not get fatigued.    Denies presyncopal symptoms. Denies tachycardia or palpitations.  No issues with lower extremity edema.    Most recent labs from 6/4/2025 with sodium 125, potassium 4.2, BUN 26, creatinine 0.71, GFR 85..     Blood pressure 128/64 and HR 82 in clinic today. Denies any bleeding issues with eliquis. She has been compliant with medications.         Recent Hospitalizations   As above          Social History      Social History     Socioeconomic History     Marital status:      Spouse name: Not on file     Number of children: Not on file     Years of education: Not on file     Highest education level: Not on file   Occupational History     Not on file   Tobacco Use     Smoking status: Never     Smokeless tobacco: Never   Substance and Sexual Activity     Alcohol use: Not Currently     Comment: NO     Drug use: Not on file     Sexual activity: Not on file   Other Topics Concern     Not on file   Social History Narrative     Not on file     Social Drivers of Health     Financial Resource Strain: Low Risk  (3/27/2025)    Financial Resource Strain      Within the past 12 months, have you or your family members you live with been unable to get  "utilities (heat, electricity) when it was really needed?: No   Food Insecurity: Low Risk  (3/27/2025)    Food Insecurity      Within the past 12 months, did you worry that your food would run out before you got money to buy more?: No      Within the past 12 months, did the food you bought just not last and you didn t have money to get more?: No   Transportation Needs: Low Risk  (3/27/2025)    Transportation Needs      Within the past 12 months, has lack of transportation kept you from medical appointments, getting your medicines, non-medical meetings or appointments, work, or from getting things that you need?: No   Physical Activity: Not on file   Stress: Not on file   Social Connections: Not on file   Interpersonal Safety: Not on file   Housing Stability: Low Risk  (3/27/2025)    Housing Stability      Do you have housing? : Yes      Are you worried about losing your housing?: No            Review of Systems:   Skin:        Eyes:       ENT:       Respiratory:  Positive for    Cardiovascular:  palpitations, edema, syncope or near-syncope, exercise intolerance Positive for, dizziness  Gastroenterology:      Genitourinary:       Musculoskeletal:  Positive for    Neurologic:  not assessed headaches  Psychiatric:       Heme/Lymph/Imm:  Positive for allergies  Endocrine:  Negative           Physical Exam:   Vitals: /64   Pulse 82   Ht 1.613 m (5' 3.5\")   Wt 60.3 kg (133 lb)   LMP  (LMP Unknown)   SpO2 96%   BMI 23.19 kg/m     Wt Readings from Last 4 Encounters:   07/07/25 60.3 kg (133 lb)   06/04/25 62.2 kg (137 lb 3.2 oz)   05/18/25 61.2 kg (135 lb)   05/16/25 60.9 kg (134 lb 3.2 oz)     GEN: well nourished, in no acute distress.  HEENT:  Pupils equal, round. Sclerae nonicteric.   NECK: Supple, no masses appreciated. No JVD with patient supine.  C/V:  Regular rate and rhythm, no murmur, rub or gallop.    RESP: Respirations are unlabored. Clear to auscultation bilaterally without wheezing, rales, or " rhonchi.  GI: Abdomen soft, nontender.  EXTREM: No LE edema.  NEURO: Alert and oriented, cooperative.  SKIN: Warm and dry.        Data:     LIPID RESULTS:  Lab Results   Component Value Date    CHOL 175 03/06/2025    HDL 78 03/06/2025    LDL 88 03/06/2025    TRIG 47 03/06/2025    TRIG 50 01/19/2024     LIVER ENZYME RESULTS:  Lab Results   Component Value Date    AST 19 06/04/2025    ALT 17 06/04/2025     CBC RESULTS:  Lab Results   Component Value Date    WBC 7.0 06/04/2025    RBC 4.02 06/04/2025    HGB 11.5 (L) 06/04/2025    HCT 33.8 (L) 06/04/2025    MCV 84 06/04/2025    MCH 28.6 06/04/2025    MCHC 34.0 06/04/2025    RDW 13.7 06/04/2025     06/04/2025     BMP RESULTS:  Lab Results   Component Value Date     (L) 06/04/2025    POTASSIUM 4.2 06/04/2025    CHLORIDE 89 (L) 06/04/2025    CHLORIDE 92 (A) 03/18/2025    CO2 24 06/04/2025    ANIONGAP 12 06/04/2025     (H) 06/04/2025     (H) 03/29/2025    BUN 26.3 (H) 06/04/2025    CR 0.71 06/04/2025    GFRESTIMATED 85 06/04/2025    SHINE 9.1 06/04/2025      A1C RESULTS:  Lab Results   Component Value Date    A1C 5.6 12/30/2024     INR RESULTS:  Lab Results   Component Value Date    INR 1.0 09/13/2024            Medications     Current Outpatient Medications   Medication Sig Dispense Refill     albuterol (PROAIR HFA/PROVENTIL HFA/VENTOLIN HFA) 108 (90 Base) MCG/ACT inhaler Inhale 2 puffs into the lungs at bedtime. And Q6H as needed       apixaban ANTICOAGULANT (ELIQUIS) 2.5 MG tablet Take 2.5 mg by mouth 2 times daily.       atorvastatin (LIPITOR) 10 MG tablet Take 1 tablet (10 mg) by mouth daily. 90 tablet 3     budesonide (PULMICORT) 0.5 MG/2ML neb solution Take 0.5 mg by nebulization daily as needed (short of breath, wheezing).       calcium carbonate (OS-SHINE) 1500 (600 Ca) MG tablet Take 600 mg by mouth 2 times daily.       cetirizine (ZYRTEC) 10 MG tablet Take 10 mg by mouth every evening.       conjugated estrogens (PREMARIN) 0.625 MG/GM  vaginal cream Place vaginally daily as needed.       docusate sodium (DSS) 100 MG capsule Take 1 capsule by mouth every evening.       fish oil-omega-3 fatty acids 1000 MG capsule Take 1 g by mouth 2 times daily.       fluticasone (FLONASE) 50 MCG/ACT nasal spray Spray 1 spray into both nostrils 2 times daily.       ipratropium (ATROVENT) 0.02 % neb solution Inhale 0.5 mg into the lungs every 6 hours as needed for wheezing.       ketoconazole (NIZORAL) 2 % external cream Apply topically daily as needed for itching or irritation. To feet       meclizine (ANTIVERT) 12.5 MG tablet Take 12.5 mg by mouth 3 times daily as needed for dizziness.       metoprolol succinate ER (TOPROL XL) 25 MG 24 hr tablet Take 1 tablet (25 mg) by mouth daily. 30 tablet 2     nitroGLYcerin (NITROSTAT) 0.4 MG sublingual tablet For chest pain place 1 tablet under the tongue, every 5 min for 3 doses. If symptoms persist 5 min after 2nd dose, call 911.  Maximum 3 doses in 15 minutes. 25 tablet 4     omeprazole (PRILOSEC) 20 MG DR capsule Take 20 mg by mouth daily as needed.       polyethylene glycol (MIRALAX) 17 g packet Take 1 packet by mouth daily as needed for constipation.       polyethylene glycol-propylene glycol (SYSTANE ULTRA) 0.4-0.3 % SOLN ophthalmic solution Place 1-2 drops into both eyes 4 times daily as needed for dry eyes.       sodium chloride (OCEAN) 0.65 % nasal spray Spray 1 spray into both nostrils 2 times daily.       torsemide (DEMADEX) 10 MG tablet Take 1 tablet (10 mg) by mouth daily. Hold for systolic blood pressure less than 100. 30 tablet 3     TRELEGY ELLIPTA 100-62.5-25 MCG/ACT oral inhaler Inhale 2 puffs into the lungs daily       valsartan (DIOVAN) 40 MG tablet Take 1 tablet (40 mg) by mouth every evening. Hold if systolic blood pressure less than 100. 45 tablet 1          Past Medical History     Past Medical History:   Diagnosis Date     Asthma      GERD (gastroesophageal reflux disease)      Heart failure with  reduced ejection fraction (H)      Paroxysmal atrial fibrillation (H)      Stress-induced cardiomyopathy      Past Surgical History:   Procedure Laterality Date     CV CORONARY ANGIOGRAM N/A 3/7/2025    Procedure: Coronary Angiogram;  Surgeon: Darryl Chris MD;  Location:  HEART CARDIAC CATH LAB     CV LEFT HEART CATH N/A 3/7/2025    Procedure: Left Heart Catheterization;  Surgeon: Darryl Chris MD;  Location:  HEART CARDIAC CATH LAB     CV LEFT VENTRICULOGRAM N/A 3/7/2025    Procedure: Left Ventriculogram;  Surgeon: Darryl Chris MD;  Location:  HEART CARDIAC CATH LAB     Family History   Problem Relation Age of Onset     Heart Defect Mother      Myocardial Infarction Father             Allergies   Sulfa antibiotics, Diazepam, Meperidine, Morphine, Penicillins, and Zafirlukast        Mckenna Yuen NP  [facility]     Thank you for allowing me to participate in the care of your patient.      Sincerely,     Mckenna Yuen NP     [facility]  cc:   Melo Louis MD  [address] [place] [id number]

## 2025-07-07 NOTE — PROGRESS NOTES
Cardiology Clinic Progress Note  Margareth Hogue MRN# [id number]   YOB: 1945 Age: 80 year old   Primary Cardiologist: Dr. Louis Reason for visit: Follow up testing            Assessment and Plan:     1.  Nonischemic cardiomyopathy, likely Takotsubo with recovered EF  - 3/7/25 Coronary angiogram: Showing no coronary artery disease, just minimal atherosclerosis in the LAD, LV with severe apical ballooning and sparing of the base  - Maintained on metoprolol, valsartan, and torsemide 10 mg daily-> previous attempts at dose reduction led to weight gain   - 3/28/2025 TTE showing improvement in LVEF to low normal, 50 to 55% and improvement in apical hypokinesis  - 7/2025 LVEF of 50 to 55%, borderline mild hypokinesis of the apex with normal right ventricular size and function, findings felt to be stable when compared to previous study  - On beta-blocker and ARB    2.  History of atrial fibrillation, s/p PVI/atrial flutter  - Anticoagulated with dose adjusted Eliquis for thromboembolic prophylaxis  - Rate controlled with metoprolol  - 6/2025: 14-day Zio patch monitor without evidence of recurrence, however questionable episodes of atrial tachycardia were present    3.  Essential hypertension, well-controlled    4.  Near syncope  -Occurred in the setting of new heart failure in March, had another episode prior to the first episode where she slipped and fell and was told not to drive, has been stable since that time without any recurrence of syncope  - Possibly secondary to #7    5.  Pulmonary sarcoid, stable    6.  Chest pain  - Multiple emergency room visits over the last 2 months for chest pain with atypical features, sometimes relieved with nitroglycerin, persisting despite Imdur and completion of cardiac rehab  -Reassuring pain is noncardiac given recent coronary angiogram in March of this year showing normal coronary arteries and only minimal atherotomous changes in the LAD  - She did also have a CT PE  study in May during one of her ER visits which did not show evidence of pulmonary emboli  - Given her lightheadedness and orthostasis, we will discontinue her Imdur and see if this improves or if her chest pain worsens    7.  SVT  - Patient with sporadic dizziness and 1 notable prolonged episode of SVT occurring during cardiac rehab lasting 13 minutes  -6/2025: 14-day Zio patch monitor showing 67 episodes of supraventricular tachycardia with the longest lasting 1 minute and 47 seconds and an average heart rate of 129 bpm, unclear if these were symptomatic as patient's said she did not use the monitor button for episodes of dizziness  - On beta-blocker with soft blood pressures, unable to uptitrate  - Check BMP and magnesium level    8.  Chronic hyponatremia  - Sodium levels typically between 127 and 134  - Possibly contributing to symptoms of dizziness and feeling generally unwell, will repeat BMP today    Plan:  Stop Imdur  Continue atorvastatin 10 mg daily  Continue apixaban 2.5 mg twice daily, dose adjusted for age and weight  Continue metoprolol XL 25 mg daily, patient will check at home if she is taking this medication  Increase valsartan 40 mg daily, with hold parameters for systolic less than 100  Continue torsemide 10 mg daily, with hold parameters  Repeat BMP today for reassessment of chronic hyponatremia and magnesium level    Follow up: Follow-up with electrophysiology for her SVT        History of Presenting Illness:    Margareth Hogue is a very pleasant 80 year old female with a history of atrial fibrillation, s/p PVI, atrial flutter/tachycardia, dyslipidemia, renal cell carcinoma, s/p right nephrectomy, pulmonary sarcoidosis, GERD, TIA.    She was seen by Dr. Louis in April of this year following a hospital admission for chest pain.  An echocardiogram done during that hospital stay showed an ejection fraction of 30 to 35%.  She did undergo coronary angiogram which showed no coronary artery disease.   She was diagnosed with a stress cardiomyopathy and initiated on appropriate medications including valsartan and metoprolol along with torsemide.   Her transthoracic echo from 3/28/2025 showed an LVEF of 50 to 55% with mild apical wall hypokinesis and no significant valvular disease.    At their clinic visit, she continued to report episodes of chest pain requiring sublingual nitroglycerin.    She was seen in the emergency room 2 times in April with reports of chest pain. Her EKGs were non-ischemic and her torsemide was resumed daily. Troponins were trended and flat.     When I saw her in early May, she felt her chest pain frequency had overall improved and we did not adjust her cardiac regimen.    She had a repeat echocardiogram on 7/2/25 which showed low normal ejection fraction of 50 to 55% with borderline mild hypokinesis of the apical segment, normal right ventricular systolic function.    Following our visit, she was seen in the ER for chest pain three times, all of which had reassuring workups. of note she has had chronically mildly elevated troponin in the 20 range since her hospitalization in March of this year.    We were notified from cardiac rehab on 5/5/2025 about a 13-minute epsiode of SVT, during which patient was asymptomatic. Subsequently,she wore a zio patch monitor which showed 67 episodes of supraventricular tachycardia with average rate of 129 bpm.  The longest episode lasted 1 minute and 46 seconds, during which patient did not trigger the heart monitor.  Her average heart rate was 79 while in sinus rhythm with a minimum of 58 bpm.    Patient is here today for a follow-up of her recent testing and ER visits.     She continues to have episodes of chest pain that come on sporadically.  Sometimes they last only a few minutes, other times it last the whole day.  She does take sublingual nitroglycerin which only helps sporadically.  Sometimes it does not impact her symptoms at all.  She did press the  monitor button for an episode of chest pain which correlated with sinus rhythm at a rate of 84 bpm.  She recently graduated from cardiac rehab.     She does have issues with dizziness.  Sometimes occurs with position changes, other times can occur while she is sitting.  She tells me she did not wish the button on the monitor when she felt dizzy as she says it happens so often.    She says she has days where she just does not feel well and will npt make plans.  She says prior to her hospitalization in March she could stay busy all day long and did not get fatigued.    Denies presyncopal symptoms. Denies tachycardia or palpitations.  No issues with lower extremity edema.    Most recent labs from 6/4/2025 with sodium 125, potassium 4.2, BUN 26, creatinine 0.71, GFR 85..     Blood pressure 128/64 and HR 82 in clinic today. Denies any bleeding issues with eliquis. She has been compliant with medications.         Recent Hospitalizations   As above          Social History      Social History     Socioeconomic History    Marital status:      Spouse name: Not on file    Number of children: Not on file    Years of education: Not on file    Highest education level: Not on file   Occupational History    Not on file   Tobacco Use    Smoking status: Never    Smokeless tobacco: Never   Substance and Sexual Activity    Alcohol use: Not Currently     Comment: NO    Drug use: Not on file    Sexual activity: Not on file   Other Topics Concern    Not on file   Social History Narrative    Not on file     Social Drivers of Health     Financial Resource Strain: Low Risk  (3/27/2025)    Financial Resource Strain     Within the past 12 months, have you or your family members you live with been unable to get utilities (heat, electricity) when it was really needed?: No   Food Insecurity: Low Risk  (3/27/2025)    Food Insecurity     Within the past 12 months, did you worry that your food would run out before you got money to buy more?:  "No     Within the past 12 months, did the food you bought just not last and you didn t have money to get more?: No   Transportation Needs: Low Risk  (3/27/2025)    Transportation Needs     Within the past 12 months, has lack of transportation kept you from medical appointments, getting your medicines, non-medical meetings or appointments, work, or from getting things that you need?: No   Physical Activity: Not on file   Stress: Not on file   Social Connections: Not on file   Interpersonal Safety: Not on file   Housing Stability: Low Risk  (3/27/2025)    Housing Stability     Do you have housing? : Yes     Are you worried about losing your housing?: No            Review of Systems:   Skin:        Eyes:       ENT:       Respiratory:  Positive for    Cardiovascular:  palpitations, edema, syncope or near-syncope, exercise intolerance Positive for, dizziness  Gastroenterology:      Genitourinary:       Musculoskeletal:  Positive for    Neurologic:  not assessed headaches  Psychiatric:       Heme/Lymph/Imm:  Positive for allergies  Endocrine:  Negative           Physical Exam:   Vitals: /64   Pulse 82   Ht 1.613 m (5' 3.5\")   Wt 60.3 kg (133 lb)   LMP  (LMP Unknown)   SpO2 96%   BMI 23.19 kg/m     Wt Readings from Last 4 Encounters:   07/07/25 60.3 kg (133 lb)   06/04/25 62.2 kg (137 lb 3.2 oz)   05/18/25 61.2 kg (135 lb)   05/16/25 60.9 kg (134 lb 3.2 oz)     GEN: well nourished, in no acute distress.  HEENT:  Pupils equal, round. Sclerae nonicteric.   NECK: Supple, no masses appreciated. No JVD with patient supine.  C/V:  Regular rate and rhythm, no murmur, rub or gallop.    RESP: Respirations are unlabored. Clear to auscultation bilaterally without wheezing, rales, or rhonchi.  GI: Abdomen soft, nontender.  EXTREM: No LE edema.  NEURO: Alert and oriented, cooperative.  SKIN: Warm and dry.        Data:     LIPID RESULTS:  Lab Results   Component Value Date    CHOL 175 03/06/2025    HDL 78 03/06/2025    LDL " 88 03/06/2025    TRIG 47 03/06/2025    TRIG 50 01/19/2024     LIVER ENZYME RESULTS:  Lab Results   Component Value Date    AST 19 06/04/2025    ALT 17 06/04/2025     CBC RESULTS:  Lab Results   Component Value Date    WBC 7.0 06/04/2025    RBC 4.02 06/04/2025    HGB 11.5 (L) 06/04/2025    HCT 33.8 (L) 06/04/2025    MCV 84 06/04/2025    MCH 28.6 06/04/2025    MCHC 34.0 06/04/2025    RDW 13.7 06/04/2025     06/04/2025     BMP RESULTS:  Lab Results   Component Value Date     (L) 06/04/2025    POTASSIUM 4.2 06/04/2025    CHLORIDE 89 (L) 06/04/2025    CHLORIDE 92 (A) 03/18/2025    CO2 24 06/04/2025    ANIONGAP 12 06/04/2025     (H) 06/04/2025     (H) 03/29/2025    BUN 26.3 (H) 06/04/2025    CR 0.71 06/04/2025    GFRESTIMATED 85 06/04/2025    SHINE 9.1 06/04/2025      A1C RESULTS:  Lab Results   Component Value Date    A1C 5.6 12/30/2024     INR RESULTS:  Lab Results   Component Value Date    INR 1.0 09/13/2024            Medications     Current Outpatient Medications   Medication Sig Dispense Refill    albuterol (PROAIR HFA/PROVENTIL HFA/VENTOLIN HFA) 108 (90 Base) MCG/ACT inhaler Inhale 2 puffs into the lungs at bedtime. And Q6H as needed      apixaban ANTICOAGULANT (ELIQUIS) 2.5 MG tablet Take 2.5 mg by mouth 2 times daily.      atorvastatin (LIPITOR) 10 MG tablet Take 1 tablet (10 mg) by mouth daily. 90 tablet 3    budesonide (PULMICORT) 0.5 MG/2ML neb solution Take 0.5 mg by nebulization daily as needed (short of breath, wheezing).      calcium carbonate (OS-SHINE) 1500 (600 Ca) MG tablet Take 600 mg by mouth 2 times daily.      cetirizine (ZYRTEC) 10 MG tablet Take 10 mg by mouth every evening.      conjugated estrogens (PREMARIN) 0.625 MG/GM vaginal cream Place vaginally daily as needed.      docusate sodium (DSS) 100 MG capsule Take 1 capsule by mouth every evening.      fish oil-omega-3 fatty acids 1000 MG capsule Take 1 g by mouth 2 times daily.      fluticasone (FLONASE) 50 MCG/ACT nasal  spray Spray 1 spray into both nostrils 2 times daily.      ipratropium (ATROVENT) 0.02 % neb solution Inhale 0.5 mg into the lungs every 6 hours as needed for wheezing.      ketoconazole (NIZORAL) 2 % external cream Apply topically daily as needed for itching or irritation. To feet      meclizine (ANTIVERT) 12.5 MG tablet Take 12.5 mg by mouth 3 times daily as needed for dizziness.      metoprolol succinate ER (TOPROL XL) 25 MG 24 hr tablet Take 1 tablet (25 mg) by mouth daily. 30 tablet 2    nitroGLYcerin (NITROSTAT) 0.4 MG sublingual tablet For chest pain place 1 tablet under the tongue, every 5 min for 3 doses. If symptoms persist 5 min after 2nd dose, call 911.  Maximum 3 doses in 15 minutes. 25 tablet 4    omeprazole (PRILOSEC) 20 MG DR capsule Take 20 mg by mouth daily as needed.      polyethylene glycol (MIRALAX) 17 g packet Take 1 packet by mouth daily as needed for constipation.      polyethylene glycol-propylene glycol (SYSTANE ULTRA) 0.4-0.3 % SOLN ophthalmic solution Place 1-2 drops into both eyes 4 times daily as needed for dry eyes.      sodium chloride (OCEAN) 0.65 % nasal spray Spray 1 spray into both nostrils 2 times daily.      torsemide (DEMADEX) 10 MG tablet Take 1 tablet (10 mg) by mouth daily. Hold for systolic blood pressure less than 100. 30 tablet 3    TRELEGY ELLIPTA 100-62.5-25 MCG/ACT oral inhaler Inhale 2 puffs into the lungs daily      valsartan (DIOVAN) 40 MG tablet Take 1 tablet (40 mg) by mouth every evening. Hold if systolic blood pressure less than 100. 45 tablet 1          Past Medical History     Past Medical History:   Diagnosis Date    Asthma     GERD (gastroesophageal reflux disease)     Heart failure with reduced ejection fraction (H)     Paroxysmal atrial fibrillation (H)     Stress-induced cardiomyopathy      Past Surgical History:   Procedure Laterality Date    CV CORONARY ANGIOGRAM N/A 3/7/2025    Procedure: Coronary Angiogram;  Surgeon: Darryl Chris MD;   Location:  [facility]    CV LEFT HEART CATH N/A 3/7/2025    Procedure: Left Heart Catheterization;  Surgeon: Darryl Chris MD;  Location:  [facility]    CV LEFT VENTRICULOGRAM N/A 3/7/2025    Procedure: Left Ventriculogram;  Surgeon: Darryl Chris MD;  Location:  [facility]     Family History   Problem Relation Age of Onset    Heart Defect Mother     Myocardial Infarction Father             Allergies   Sulfa antibiotics, Diazepam, Meperidine, Morphine, Penicillins, and Zafirlukast        Mckenna Yuen NP  [facility]

## 2025-07-07 NOTE — TELEPHONE ENCOUNTER
"Team received phone call from Margareth.   She states that Shelli wanted her to call in and confirm if patient is still taking the Metoprolol or not.   Margareth says she is.    Informed Shelli, who said \"Let's continue on with the plan discussed at office visit today.  Stop Imdur and increase the Valsartan.\"    Called back to patient, had to LVM.   Instructed her to call us if she has questions about these med changes.    Cris Carl RN on 7/7/2025 at 1:48 PM    "

## 2025-07-07 NOTE — PATIENT INSTRUCTIONS
Today's Recommendations    I would like to check your labs today   Stop taking your imdur tablets   Lets increase your valsartan to 40mg daily   Double check at home if you are taking your metoprolol   Continue all other medications without changes.  Please follow up with our electrophysiology team when available  My nurses will call you in 2 weeks to see how you are feeling after we stop the imdur.    Please send a [employer] message or call [phone] to the RN team with questions or concerns.     Scheduling number [phone]  KIRILL John, CNP

## 2025-07-29 ENCOUNTER — TELEPHONE (OUTPATIENT)
Dept: CARDIOLOGY | Facility: CLINIC | Age: 80
End: 2025-07-29
Payer: COMMERCIAL

## 2025-08-21 ENCOUNTER — APPOINTMENT (OUTPATIENT)
Dept: GENERAL RADIOLOGY | Facility: CLINIC | Age: 80
End: 2025-08-21
Attending: EMERGENCY MEDICINE
Payer: COMMERCIAL

## 2025-08-21 ENCOUNTER — HOSPITAL ENCOUNTER (EMERGENCY)
Facility: CLINIC | Age: 80
Discharge: HOME | End: 2025-08-21
Attending: EMERGENCY MEDICINE
Payer: COMMERCIAL

## 2025-08-21 VITALS
TEMPERATURE: 97.5 F | WEIGHT: 130 LBS | BODY MASS INDEX: 23.04 KG/M2 | SYSTOLIC BLOOD PRESSURE: 124 MMHG | OXYGEN SATURATION: 97 % | HEIGHT: 63 IN | HEART RATE: 70 BPM | DIASTOLIC BLOOD PRESSURE: 70 MMHG | RESPIRATION RATE: 14 BRPM

## 2025-08-21 DIAGNOSIS — R55 NEAR SYNCOPE: Primary | ICD-10-CM

## 2025-08-21 LAB
ALBUMIN UR-MCNC: NEGATIVE MG/DL
ANION GAP SERPL CALCULATED.3IONS-SCNC: 12 MMOL/L (ref 7–15)
APPEARANCE UR: CLEAR
ATRIAL RATE - MUSE: 71 BPM
BASOPHILS # BLD AUTO: 0.03 10E3/UL (ref 0–0.2)
BASOPHILS NFR BLD AUTO: 0.4 %
BILIRUB UR QL STRIP: NEGATIVE
BUN SERPL-MCNC: 22.4 MG/DL (ref 8–23)
CALCIUM SERPL-MCNC: 9.4 MG/DL (ref 8.8–10.4)
CHLORIDE SERPL-SCNC: 92 MMOL/L (ref 98–107)
COLOR UR AUTO: ABNORMAL
CREAT SERPL-MCNC: 0.75 MG/DL (ref 0.51–0.95)
DIASTOLIC BLOOD PRESSURE - MUSE: NORMAL MMHG
EGFRCR SERPLBLD CKD-EPI 2021: 80 ML/MIN/1.73M2
EOSINOPHIL # BLD AUTO: 0.09 10E3/UL (ref 0–0.7)
EOSINOPHIL NFR BLD AUTO: 1.3 %
ERYTHROCYTE [DISTWIDTH] IN BLOOD BY AUTOMATED COUNT: 13.1 % (ref 10–15)
GLUCOSE SERPL-MCNC: 105 MG/DL (ref 70–99)
GLUCOSE UR STRIP-MCNC: NEGATIVE MG/DL
HCO3 SERPL-SCNC: 23 MMOL/L (ref 22–29)
HCT VFR BLD AUTO: 34.1 % (ref 35–47)
HGB BLD-MCNC: 11.5 G/DL (ref 11.7–15.7)
HGB UR QL STRIP: NEGATIVE
HOLD SPECIMEN: NORMAL
IMM GRANULOCYTES # BLD: <0.03 10E3/UL
IMM GRANULOCYTES NFR BLD: 0.3 %
INTERPRETATION ECG - MUSE: NORMAL
KETONES UR STRIP-MCNC: NEGATIVE MG/DL
LEUKOCYTE ESTERASE UR QL STRIP: ABNORMAL
LYMPHOCYTES # BLD AUTO: 1.42 10E3/UL (ref 0.8–5.3)
LYMPHOCYTES NFR BLD AUTO: 20.8 %
MCH RBC QN AUTO: 28.8 PG (ref 26.5–33)
MCHC RBC AUTO-ENTMCNC: 33.7 G/DL (ref 31.5–36.5)
MCV RBC AUTO: 85.3 FL (ref 78–100)
MONOCYTES # BLD AUTO: 0.61 10E3/UL (ref 0–1.3)
MONOCYTES NFR BLD AUTO: 8.9 %
NEUTROPHILS # BLD AUTO: 4.65 10E3/UL (ref 1.6–8.3)
NEUTROPHILS NFR BLD AUTO: 68.3 %
NITRATE UR QL: NEGATIVE
NRBC # BLD AUTO: <0.03 10E3/UL
NRBC BLD AUTO-RTO: 0 /100
P AXIS - MUSE: 63 DEGREES
PH UR STRIP: 7 [PH] (ref 5–7)
PLATELET # BLD AUTO: 339 10E3/UL (ref 150–450)
POTASSIUM SERPL-SCNC: 4.1 MMOL/L (ref 3.4–5.3)
PR INTERVAL - MUSE: 178 MS
QRS DURATION - MUSE: 100 MS
QT - MUSE: 396 MS
QTC - MUSE: 430 MS
R AXIS - MUSE: 22 DEGREES
RBC # BLD AUTO: 4 10E6/UL (ref 3.8–5.2)
RBC URINE: 1 /HPF
SODIUM SERPL-SCNC: 127 MMOL/L (ref 135–145)
SP GR UR STRIP: 1.01 (ref 1–1.03)
SQUAMOUS EPITHELIAL: 1 /HPF
SYSTOLIC BLOOD PRESSURE - MUSE: NORMAL MMHG
T AXIS - MUSE: 72 DEGREES
TROPONIN T SERPL HS-MCNC: 22 NG/L
TROPONIN T SERPL HS-MCNC: 25 NG/L
UROBILINOGEN UR STRIP-MCNC: NORMAL MG/DL
VENTRICULAR RATE- MUSE: 71 BPM
WBC # BLD AUTO: 6.82 10E3/UL (ref 4–11)
WBC URINE: 4 /HPF

## 2025-08-21 PROCEDURE — 85025 COMPLETE CBC W/AUTO DIFF WBC: CPT | Performed by: EMERGENCY MEDICINE

## 2025-08-21 PROCEDURE — 96360 HYDRATION IV INFUSION INIT: CPT

## 2025-08-21 PROCEDURE — 71046 X-RAY EXAM CHEST 2 VIEWS: CPT

## 2025-08-21 PROCEDURE — 80048 BASIC METABOLIC PNL TOTAL CA: CPT | Performed by: EMERGENCY MEDICINE

## 2025-08-21 PROCEDURE — 258N000003 HC RX IP 258 OP 636: Performed by: EMERGENCY MEDICINE

## 2025-08-21 PROCEDURE — 96361 HYDRATE IV INFUSION ADD-ON: CPT

## 2025-08-21 PROCEDURE — 81001 URINALYSIS AUTO W/SCOPE: CPT | Performed by: EMERGENCY MEDICINE

## 2025-08-21 PROCEDURE — 99285 EMERGENCY DEPT VISIT HI MDM: CPT | Mod: 25 | Performed by: EMERGENCY MEDICINE

## 2025-08-21 PROCEDURE — 84484 ASSAY OF TROPONIN QUANT: CPT | Performed by: EMERGENCY MEDICINE

## 2025-08-21 PROCEDURE — 36415 COLL VENOUS BLD VENIPUNCTURE: CPT | Performed by: EMERGENCY MEDICINE

## 2025-08-21 PROCEDURE — 93005 ELECTROCARDIOGRAM TRACING: CPT

## 2025-08-21 RX ADMIN — SODIUM CHLORIDE 1000 ML: 0.9 INJECTION, SOLUTION INTRAVENOUS at 17:37

## 2025-08-21 ASSESSMENT — ACTIVITIES OF DAILY LIVING (ADL)
ADLS_ACUITY_SCORE: 59

## 2025-08-29 ENCOUNTER — MYC MEDICAL ADVICE (OUTPATIENT)
Dept: CARDIOLOGY | Facility: CLINIC | Age: 80
End: 2025-08-29
Payer: COMMERCIAL

## 2025-08-29 DIAGNOSIS — I47.10 SVT (SUPRAVENTRICULAR TACHYCARDIA): ICD-10-CM

## 2025-08-29 DIAGNOSIS — R55 PRE-SYNCOPE: Primary | ICD-10-CM

## 2025-09-03 ENCOUNTER — TELEPHONE (OUTPATIENT)
Dept: CARDIOLOGY | Facility: CLINIC | Age: 80
End: 2025-09-03
Payer: COMMERCIAL

## (undated) DEVICE — DEFIB PRO-PADZ LVP LQD GEL ADULT 8900-2105-01

## (undated) DEVICE — INTRO GLIDESHEATH SLENDER 6FR 10X45CM 60-1060

## (undated) DEVICE — WIRE GUIDE 0.035"X260CM SAFE-T-J EXCHANGE G00517

## (undated) DEVICE — Device

## (undated) DEVICE — CATH DIAG 4FR ANG PIG 538453S

## (undated) DEVICE — NDL PERC ENTRY 21GA 4CM G00280

## (undated) DEVICE — SLEEVE TR BAND RADIAL COMPRESSION DEVICE 24CM TRB24-REG

## (undated) DEVICE — KIT HAND CONTROL ANGIOTOUCH ACIST 65CM AT-P65

## (undated) DEVICE — CATH DIAGNOSTIC RADIAL 5FR TIG 4.0

## (undated) DEVICE — MANIFOLD KIT ANGIO AUTOMATED 014613

## (undated) DEVICE — TOTE ANGIO CORP PC15AT SAN32CC83O

## (undated) RX ORDER — VERAPAMIL HYDROCHLORIDE 2.5 MG/ML
INJECTION, SOLUTION INTRAVENOUS
Status: DISPENSED
Start: 2025-03-07

## (undated) RX ORDER — NITROGLYCERIN 5 MG/ML
VIAL (ML) INTRAVENOUS
Status: DISPENSED
Start: 2025-03-07

## (undated) RX ORDER — LIDOCAINE HYDROCHLORIDE 10 MG/ML
INJECTION, SOLUTION EPIDURAL; INFILTRATION; INTRACAUDAL; PERINEURAL
Status: DISPENSED
Start: 2025-03-07

## (undated) RX ORDER — FENTANYL CITRATE 50 UG/ML
INJECTION, SOLUTION INTRAMUSCULAR; INTRAVENOUS
Status: DISPENSED
Start: 2025-03-07

## (undated) RX ORDER — HEPARIN SODIUM 200 [USP'U]/100ML
INJECTION, SOLUTION INTRAVENOUS
Status: DISPENSED
Start: 2025-03-07

## (undated) RX ORDER — HEPARIN SODIUM 1000 [USP'U]/ML
INJECTION, SOLUTION INTRAVENOUS; SUBCUTANEOUS
Status: DISPENSED
Start: 2025-03-07